# Patient Record
Sex: MALE | HISPANIC OR LATINO | Employment: UNEMPLOYED | URBAN - METROPOLITAN AREA
[De-identification: names, ages, dates, MRNs, and addresses within clinical notes are randomized per-mention and may not be internally consistent; named-entity substitution may affect disease eponyms.]

---
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
## 2022-03-12 ENCOUNTER — APPOINTMENT (OUTPATIENT)
Dept: RADIOLOGY | Facility: MEDICAL CENTER | Age: 22
End: 2022-03-12
Attending: EMERGENCY MEDICINE

## 2022-03-12 ENCOUNTER — HOSPITAL ENCOUNTER (EMERGENCY)
Facility: MEDICAL CENTER | Age: 22
End: 2022-03-12
Attending: EMERGENCY MEDICINE

## 2022-03-12 VITALS
WEIGHT: 130 LBS | BODY MASS INDEX: 21.66 KG/M2 | TEMPERATURE: 97.7 F | HEIGHT: 65 IN | SYSTOLIC BLOOD PRESSURE: 103 MMHG | RESPIRATION RATE: 15 BRPM | OXYGEN SATURATION: 95 % | DIASTOLIC BLOOD PRESSURE: 57 MMHG | HEART RATE: 79 BPM

## 2022-03-12 DIAGNOSIS — T07.XXXA ABRASIONS OF MULTIPLE SITES: ICD-10-CM

## 2022-03-12 DIAGNOSIS — S80.02XA HEMATOMA OF LEFT KNEE REGION: ICD-10-CM

## 2022-03-12 DIAGNOSIS — F10.920 ALCOHOLIC INTOXICATION WITHOUT COMPLICATION (HCC): ICD-10-CM

## 2022-03-12 LAB
ABO GROUP BLD: NORMAL
ALBUMIN SERPL BCP-MCNC: 5 G/DL (ref 3.2–4.9)
ALBUMIN/GLOB SERPL: 1.9 G/DL
ALP SERPL-CCNC: 90 U/L (ref 30–99)
ALT SERPL-CCNC: 20 U/L (ref 2–50)
ANION GAP SERPL CALC-SCNC: 18 MMOL/L (ref 7–16)
AST SERPL-CCNC: 26 U/L (ref 12–45)
BILIRUB SERPL-MCNC: 0.3 MG/DL (ref 0.1–1.5)
BLD GP AB SCN SERPL QL: NORMAL
BUN SERPL-MCNC: 9 MG/DL (ref 8–22)
CALCIUM SERPL-MCNC: 9.2 MG/DL (ref 8.5–10.5)
CHLORIDE SERPL-SCNC: 107 MMOL/L (ref 96–112)
CO2 SERPL-SCNC: 18 MMOL/L (ref 20–33)
CREAT SERPL-MCNC: 0.74 MG/DL (ref 0.5–1.4)
ERYTHROCYTE [DISTWIDTH] IN BLOOD BY AUTOMATED COUNT: 39 FL (ref 35.9–50)
ETHANOL BLD-MCNC: 253.1 MG/DL (ref 0–10)
GLOBULIN SER CALC-MCNC: 2.7 G/DL (ref 1.9–3.5)
GLUCOSE SERPL-MCNC: 97 MG/DL (ref 65–99)
HCT VFR BLD AUTO: 48 % (ref 42–52)
HGB BLD-MCNC: 16.8 G/DL (ref 14–18)
INR PPP: 0.93 (ref 0.87–1.13)
MCH RBC QN AUTO: 29.7 PG (ref 27–33)
MCHC RBC AUTO-ENTMCNC: 35 G/DL (ref 33.7–35.3)
MCV RBC AUTO: 84.8 FL (ref 81.4–97.8)
PLATELET # BLD AUTO: 319 K/UL (ref 164–446)
PMV BLD AUTO: 9.9 FL (ref 9–12.9)
POTASSIUM SERPL-SCNC: 3.8 MMOL/L (ref 3.6–5.5)
PROT SERPL-MCNC: 7.7 G/DL (ref 6–8.2)
PROTHROMBIN TIME: 12.2 SEC (ref 12–14.6)
RBC # BLD AUTO: 5.66 M/UL (ref 4.7–6.1)
RH BLD: NORMAL
SODIUM SERPL-SCNC: 143 MMOL/L (ref 135–145)
WBC # BLD AUTO: 12.4 K/UL (ref 4.8–10.8)

## 2022-03-12 PROCEDURE — 86850 RBC ANTIBODY SCREEN: CPT

## 2022-03-12 PROCEDURE — 72128 CT CHEST SPINE W/O DYE: CPT

## 2022-03-12 PROCEDURE — 700111 HCHG RX REV CODE 636 W/ 250 OVERRIDE (IP): Performed by: EMERGENCY MEDICINE

## 2022-03-12 PROCEDURE — 85610 PROTHROMBIN TIME: CPT

## 2022-03-12 PROCEDURE — 85027 COMPLETE CBC AUTOMATED: CPT

## 2022-03-12 PROCEDURE — 71260 CT THORAX DX C+: CPT

## 2022-03-12 PROCEDURE — 99285 EMERGENCY DEPT VISIT HI MDM: CPT

## 2022-03-12 PROCEDURE — 73560 X-RAY EXAM OF KNEE 1 OR 2: CPT | Mod: RT

## 2022-03-12 PROCEDURE — 307740 HCHG GREEN TRAUMA TEAM SERVICES

## 2022-03-12 PROCEDURE — 72131 CT LUMBAR SPINE W/O DYE: CPT

## 2022-03-12 PROCEDURE — 71045 X-RAY EXAM CHEST 1 VIEW: CPT

## 2022-03-12 PROCEDURE — 70450 CT HEAD/BRAIN W/O DYE: CPT

## 2022-03-12 PROCEDURE — 96374 THER/PROPH/DIAG INJ IV PUSH: CPT

## 2022-03-12 PROCEDURE — 86900 BLOOD TYPING SEROLOGIC ABO: CPT

## 2022-03-12 PROCEDURE — 73560 X-RAY EXAM OF KNEE 1 OR 2: CPT | Mod: LT

## 2022-03-12 PROCEDURE — 72170 X-RAY EXAM OF PELVIS: CPT

## 2022-03-12 PROCEDURE — 72125 CT NECK SPINE W/O DYE: CPT

## 2022-03-12 PROCEDURE — 86901 BLOOD TYPING SEROLOGIC RH(D): CPT

## 2022-03-12 PROCEDURE — 82077 ASSAY SPEC XCP UR&BREATH IA: CPT

## 2022-03-12 PROCEDURE — 700117 HCHG RX CONTRAST REV CODE 255: Performed by: EMERGENCY MEDICINE

## 2022-03-12 PROCEDURE — 80053 COMPREHEN METABOLIC PANEL: CPT

## 2022-03-12 RX ADMIN — FENTANYL CITRATE 100 MCG: 50 INJECTION, SOLUTION INTRAMUSCULAR; INTRAVENOUS at 20:54

## 2022-03-12 RX ADMIN — IOHEXOL 100 ML: 350 INJECTION, SOLUTION INTRAVENOUS at 21:15
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
## 2022-03-13 NOTE — ED NOTES
"Pt discharged home. IV discontinued and gauze placed, pt in possession of belongings. Pt provided discharge education and information pertaining to medications and follow up appointments. Pt received copy of discharge instructions and verbalized understanding. /57   Pulse 79   Temp 36.5 °C (97.7 °F) (Temporal)   Resp 15   Ht 1.651 m (5' 5\")   Wt 59 kg (130 lb)   SpO2 95%   BMI 21.63 kg/m²   "

## 2022-03-29 NOTE — ED AVS SNAPSHOT
Spiration Access Code: [email or web address]  Expires: 7/6/2017  1:54 PM    Your email address is not on file at [facility].  Email Addresses are required for you to sign up for Spiration, please contact [phone] to verify your personal information and to provide your email address prior to attempting to register for Spiration.    Praful Carmen  [address] [place] [address]    Spiration  A secure, online tool to manage your health information     [facility]’s Spiration® is a secure, online tool that connects you to your personalized health information from the privacy of your home -- day or night - making it very easy for you to manage your healthcare. Once the activation process is completed, you can even access your medical information using the Spiration suzette, which is available for free in the Apple Suzette store or Google Play store.     To learn more about Spiration, visit [email or web address]    There are two levels of access available (as shown below):   My Chart Features  [facility] Primary Care Doctor [facility]  Specialists [facility]  Urgent  Care [facility] Primary Care Doctor   Email your healthcare team securely and privately 24/7 X X X    Manage appointments: schedule your next appointment; view details of past/upcoming appointments X      Request prescription refills. X      View recent personal medical records, including lab and immunizations X X X X   View health record, including health history, allergies, medications X X X X   Read reports about your outpatient visits, procedures, consult and ER notes X X X X   See your discharge summary, which is a recap of your hospital and/or ER visit that includes your diagnosis, lab results, and care plan X X  X     How to register for [employer]:  Once your e-mail address has been verified, follow the following steps to sign up for Spiration.     1. Go to  [email or web address]  2. Click on the Sign Up Now box, which takes you to the New Member Sign Up page. You  will need to provide the following information:  a. Enter your TrustTeam Access Code exactly as it appears at the top of this page. (You will not need to use this code after you’ve completed the sign-up process. If you do not sign up before the expiration date, you must request a new code.)   b. Enter your date of birth.   c. Enter your home email address.   d. Click Submit, and follow the next screen’s instructions.  3. Create a Traianat ID. This will be your TrustTeam login ID and cannot be changed, so think of one that is secure and easy to remember.  4. Create a TrustTeam password. You can change your password at any time.  5. Enter your Password Reset Question and Answer. This can be used at a later time if you forget your password.   6. Enter your e-mail address. This allows you to receive e-mail notifications when new information is available in TrustTeam.  7. Click Sign Up. You can now view your health information.    For assistance activating your TrustTeam account, call [phone]          [facility] Counseling:  I discussed with the patient the risks of ustekinumab including but not limited to immunosuppression, malignancy, posterior leukoencephalopathy syndrome, and serious infections.  The patient understands that monitoring is required including a PPD at baseline and must alert us or the primary physician if symptoms of infection or other concerning signs are noted.

## 2022-04-29 ENCOUNTER — NON-PROVIDER VISIT (OUTPATIENT)
Dept: URGENT CARE | Facility: PHYSICIAN GROUP | Age: 22
End: 2022-04-29

## 2022-04-29 DIAGNOSIS — Z02.1 PRE-EMPLOYMENT DRUG SCREENING: ICD-10-CM

## 2022-04-29 LAB
AMP AMPHETAMINE: NORMAL
COC COCAINE: NORMAL
INT CON NEG: NORMAL
INT CON POS: NORMAL
MET METHAMPHETAMINES: NORMAL
OPI OPIATES: NORMAL
PCP PHENCYCLIDINE: NORMAL
POC DRUG COMMENT 753798-OCCUPATIONAL HEALTH: NORMAL
THC: NORMAL

## 2022-04-29 PROCEDURE — 80305 DRUG TEST PRSMV DIR OPT OBS: CPT | Performed by: NURSE PRACTITIONER

## 2024-03-25 ENCOUNTER — OFFICE VISIT (OUTPATIENT)
Dept: URGENT CARE | Facility: CLINIC | Age: 24
End: 2024-03-25

## 2024-03-25 VITALS
TEMPERATURE: 98.1 F | OXYGEN SATURATION: 98 % | RESPIRATION RATE: 20 BRPM | HEART RATE: 87 BPM | WEIGHT: 144 LBS | DIASTOLIC BLOOD PRESSURE: 88 MMHG | HEIGHT: 60 IN | SYSTOLIC BLOOD PRESSURE: 128 MMHG | BODY MASS INDEX: 28.27 KG/M2
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
## 2024-03-25 PROCEDURE — 3079F DIAST BP 80-89 MM HG: CPT | Performed by: NURSE PRACTITIONER

## 2024-03-25 PROCEDURE — 99203 OFFICE O/P NEW LOW 30 MIN: CPT | Performed by: NURSE PRACTITIONER

## 2024-03-25 PROCEDURE — 3074F SYST BP LT 130 MM HG: CPT | Performed by: NURSE PRACTITIONER

## 2024-03-25 RX ORDER — AMOXICILLIN AND CLAVULANATE POTASSIUM 875; 125 MG/1; MG/1
1 TABLET, FILM COATED ORAL 2 TIMES DAILY
Qty: 20 TABLET | Refills: 0 | Status: SHIPPED | OUTPATIENT
Start: 2024-03-25 | End: 2024-04-04

## 2024-03-25 NOTE — LETTER
[facility]  [facility]  [address] [facility] [address]  [place] [address]     March 25, 2024    Patient: Praful Carmen   YOB: 2000   Date of Visit: 3/25/2024       To Whom It May Concern:    Praful Carmen was seen and treated in our department on 3/25/2024. Please excuse from work 3/25/24 through 3/27/24. May return 3/28/24.     Sincerely,     MIL Selby.

## 2024-03-25 NOTE — PROGRESS NOTES
Date: 03/25/24        Chief Complaint   Patient presents with    Animal Bite     Dog bite yesterday         History of Present Illness: 23 y.o.  male presents to clinic with multiple abrasions and lacerations to his right hand after he was bit by a dog [place] the dog park.  He states it was pitbull the dog was not acting erratic.  He denies any distal numbness or tingling.  He denies any fever or bodyaches.  He believes the dog was up-to-date on vaccinations.  He did speak with the owner.      ROS:    No severe shortness of breath   No Cardiac like chest pain, as discussed   As otherwise stated in HPI    Medical/SX/ Social History:  Reviewed per chart    Pertinent Medications:    Current Outpatient Medications on File Prior to Visit   Medication Sig Dispense Refill    acetaminophen (TYLENOL) 500 MG Tab Take 2 Tabs by mouth every 6 hours. (Patient not taking: Reported on 3/25/2024) 30 Tab 0    oxycodone immediate-release (ROXICODONE) 5 MG Tab Take 1-2 Tabs by mouth every 8 hours as needed for Severe Pain. (Patient not taking: Reported on 3/25/2024) 14 Tab 0    ferrous sulfate 325 (65 FE) MG EC tablet Take 1 Tab by mouth every day. (Patient not taking: Reported on 3/25/2024)      ibuprofen (MOTRIN) 800 MG Tab Take 1 Tab by mouth every 8 hours as needed for Moderate Pain. (Patient not taking: Reported on 3/25/2024)       No current facility-administered medications on file prior to visit.        Allergies:    Patient has no known allergies.     Problem list, medications, and allergies reviewed by myself today in Epic     Physical Exam:    Vitals:    03/25/24 0858   BP: 128/88   Pulse: 87   Resp: 20   Temp: 36.7 °C (98.1 °F)   SpO2: 98%             Physical Exam  Constitutional:       General: He is not in acute distress.     Appearance: Normal appearance. He is not ill-appearing.   Musculoskeletal:      Right elbow: Normal.      Right forearm: Swelling, laceration and tenderness present. No edema, deformity or  bony tenderness.      Right hand: Swelling, laceration and tenderness present. No bony tenderness. Decreased range of motion (Fifth DIP secondary to swelling). Normal strength. Normal sensation. There is no disruption of two-point discrimination. Normal capillary refill. Normal pulse.      Left hand: No bony tenderness. Normal strength. Normal sensation. There is no disruption of two-point discrimination. Normal capillary refill. Normal pulse.        Arms:         Hands:    Neurological:      Mental Status: He is alert and oriented to person, place, and time.      Cranial Nerves: Cranial nerves 2-12 are intact.      Sensory: Sensation is intact.      Motor: Motor function is intact.      Coordination: Coordination is intact.   Psychiatric:         Attention and Perception: Attention normal.         Mood and Affect: Mood is anxious.         Speech: Speech normal.         Behavior: Behavior normal.              Medical Decision making and plan :  I personally reviewed prior external notes and test results pertinent to today's visit. Pt is clinically stable at today's acute urgent care visit.  Patient appears nontoxic with no acute distress noted. Appropriate for outpatient care at this time.      Pleasant 23 y.o. male presented clinic with multiple abrasions and avulsion secondary to dog bite that happened yesterday.  Patient does not need a tetanus vaccination.  Although due to cash pay patient would like to obtain his tetanus at the pharmacy as it would be more cost effective.  Did stress importance of receiving his tetanus.  Will start patient on Augmentin while there are no active signs of infection I will send for 10 days advised on antibiotics in entirety.  Discussed appropriate wound care.  For avulsion of skin of the fifth right finger, covered in Polysporin Xeroform nonstick Telfa and Coban.  We did discuss obtaining x-ray.  Although low suspicion of acute fracture as there is no puncture injury.  Patient  like to defer x-ray at this time advised that if pain is not improving to the DIP in 10 days to return to clinic.  Discussed signs and symptoms of infection.  Patient was provided wound care materials.  Work note provided    1. Dog bite, initial encounter    - amoxicillin-clavulanate (AUGMENTIN) 875-125 MG Tab; Take 1 Tablet by mouth 2 times a day for 10 days.  Dispense: 20 Tablet; Refill: 0       Shared decision-making was utilized with patient for treatment plan. Medication discussed included indication for use and the potential benefits and side effects. Education was provided regarding the aforementioned assessments.  Differential Diagnosis, natural history, and supportive care discussed. All of the patient's questions were answered to their satisfaction at the time of discharge. Patient was encouraged to monitor symptoms closely. Those signs and symptoms which would warrant concern and mandate seeking a higher level of service through the emergency department discussed at length.  Patient stated agreement and understanding of this plan of care.    Disposition:  Home in stable condition       Voice Recognition Disclaimer:  Portions of this document were created using voice recognition software. The software does have a chance of producing errors of grammar and possibly content. I have made every reasonable attempt to correct obvious errors, but there may be errors of grammar and possibly content that I did not discover before finalizing the documentation.    [email or web address].

## 2024-06-16 ENCOUNTER — APPOINTMENT (OUTPATIENT)
Dept: RADIOLOGY | Facility: MEDICAL CENTER | Age: 24
End: 2024-06-16
Attending: STUDENT IN AN ORGANIZED HEALTH CARE EDUCATION/TRAINING PROGRAM

## 2024-06-16 ENCOUNTER — HOSPITAL ENCOUNTER (EMERGENCY)
Facility: MEDICAL CENTER | Age: 24
End: 2024-06-16
Attending: STUDENT IN AN ORGANIZED HEALTH CARE EDUCATION/TRAINING PROGRAM

## 2024-06-16 VITALS
BODY MASS INDEX: 23.7 KG/M2 | DIASTOLIC BLOOD PRESSURE: 65 MMHG | HEART RATE: 89 BPM | WEIGHT: 160 LBS | RESPIRATION RATE: 17 BRPM | OXYGEN SATURATION: 94 % | SYSTOLIC BLOOD PRESSURE: 119 MMHG | HEIGHT: 69 IN | TEMPERATURE: 97.3 F

## 2024-06-16 DIAGNOSIS — T07.XXXA MULTIPLE ABRASIONS: ICD-10-CM
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
## 2024-06-16 DIAGNOSIS — S80.01XA CONTUSION OF RIGHT KNEE, INITIAL ENCOUNTER: ICD-10-CM

## 2024-06-16 DIAGNOSIS — S46.211A STRAIN OF RIGHT BICEPS MUSCLE, INITIAL ENCOUNTER: ICD-10-CM

## 2024-06-16 LAB
ABO GROUP BLD: NORMAL
ALBUMIN SERPL BCP-MCNC: 4.7 G/DL (ref 3.2–4.9)
ALBUMIN/GLOB SERPL: 1.6 G/DL
ALP SERPL-CCNC: 94 U/L (ref 30–99)
ALT SERPL-CCNC: 45 U/L (ref 2–50)
ANION GAP SERPL CALC-SCNC: 20 MMOL/L (ref 7–16)
APTT PPP: 22.3 SEC (ref 24.7–36)
AST SERPL-CCNC: 69 U/L (ref 12–45)
BILIRUB SERPL-MCNC: 0.5 MG/DL (ref 0.1–1.5)
BLD GP AB SCN SERPL QL: NORMAL
BUN SERPL-MCNC: 8 MG/DL (ref 8–22)
CALCIUM ALBUM COR SERPL-MCNC: 8.1 MG/DL (ref 8.5–10.5)
CALCIUM SERPL-MCNC: 8.7 MG/DL (ref 8.5–10.5)
CHLORIDE SERPL-SCNC: 108 MMOL/L (ref 96–112)
CO2 SERPL-SCNC: 17 MMOL/L (ref 20–33)
CREAT SERPL-MCNC: 0.76 MG/DL (ref 0.5–1.4)
ERYTHROCYTE [DISTWIDTH] IN BLOOD BY AUTOMATED COUNT: 38.6 FL (ref 35.9–50)
ETHANOL BLD-MCNC: 294.2 MG/DL
GFR SERPLBLD CREATININE-BSD FMLA CKD-EPI: 128 ML/MIN/1.73 M 2
GLOBULIN SER CALC-MCNC: 2.9 G/DL (ref 1.9–3.5)
GLUCOSE SERPL-MCNC: 137 MG/DL (ref 65–99)
HCT VFR BLD AUTO: 47.4 % (ref 42–52)
HGB BLD-MCNC: 16.8 G/DL (ref 14–18)
INR PPP: 1.06 (ref 0.87–1.13)
MCH RBC QN AUTO: 30.3 PG (ref 27–33)
MCHC RBC AUTO-ENTMCNC: 35.4 G/DL (ref 32.3–36.5)
MCV RBC AUTO: 85.4 FL (ref 81.4–97.8)
PLATELET # BLD AUTO: 357 K/UL (ref 164–446)
PMV BLD AUTO: 9.5 FL (ref 9–12.9)
POTASSIUM SERPL-SCNC: 3.2 MMOL/L (ref 3.6–5.5)
PROT SERPL-MCNC: 7.6 G/DL (ref 6–8.2)
PROTHROMBIN TIME: 13.9 SEC (ref 12–14.6)
RBC # BLD AUTO: 5.55 M/UL (ref 4.7–6.1)
RH BLD: NORMAL
SODIUM SERPL-SCNC: 145 MMOL/L (ref 135–145)
WBC # BLD AUTO: 16.9 K/UL (ref 4.8–10.8)

## 2024-06-16 PROCEDURE — 86900 BLOOD TYPING SEROLOGIC ABO: CPT

## 2024-06-16 PROCEDURE — 85730 THROMBOPLASTIN TIME PARTIAL: CPT

## 2024-06-16 PROCEDURE — 90715 TDAP VACCINE 7 YRS/> IM: CPT | Performed by: STUDENT IN AN ORGANIZED HEALTH CARE EDUCATION/TRAINING PROGRAM

## 2024-06-16 PROCEDURE — 73560 X-RAY EXAM OF KNEE 1 OR 2: CPT | Mod: RT

## 2024-06-16 PROCEDURE — 36415 COLL VENOUS BLD VENIPUNCTURE: CPT

## 2024-06-16 PROCEDURE — 73080 X-RAY EXAM OF ELBOW: CPT | Mod: RT

## 2024-06-16 PROCEDURE — 86850 RBC ANTIBODY SCREEN: CPT

## 2024-06-16 PROCEDURE — 96374 THER/PROPH/DIAG INJ IV PUSH: CPT

## 2024-06-16 PROCEDURE — 71045 X-RAY EXAM CHEST 1 VIEW: CPT

## 2024-06-16 PROCEDURE — 85027 COMPLETE CBC AUTOMATED: CPT

## 2024-06-16 PROCEDURE — 70450 CT HEAD/BRAIN W/O DYE: CPT

## 2024-06-16 PROCEDURE — 90471 IMMUNIZATION ADMIN: CPT

## 2024-06-16 PROCEDURE — 85610 PROTHROMBIN TIME: CPT

## 2024-06-16 PROCEDURE — 73120 X-RAY EXAM OF HAND: CPT | Mod: LT

## 2024-06-16 PROCEDURE — 80053 COMPREHEN METABOLIC PANEL: CPT

## 2024-06-16 PROCEDURE — 73130 X-RAY EXAM OF HAND: CPT | Mod: RT

## 2024-06-16 PROCEDURE — 73060 X-RAY EXAM OF HUMERUS: CPT | Mod: RT

## 2024-06-16 PROCEDURE — A9270 NON-COVERED ITEM OR SERVICE: HCPCS | Performed by: STUDENT IN AN ORGANIZED HEALTH CARE EDUCATION/TRAINING PROGRAM

## 2024-06-16 PROCEDURE — 72125 CT NECK SPINE W/O DYE: CPT

## 2024-06-16 PROCEDURE — 305948 HCHG GREEN TRAUMA ACT PRE-NOTIFY NO CC

## 2024-06-16 PROCEDURE — 700111 HCHG RX REV CODE 636 W/ 250 OVERRIDE (IP): Mod: JZ | Performed by: STUDENT IN AN ORGANIZED HEALTH CARE EDUCATION/TRAINING PROGRAM

## 2024-06-16 PROCEDURE — 82077 ASSAY SPEC XCP UR&BREATH IA: CPT

## 2024-06-16 PROCEDURE — 72170 X-RAY EXAM OF PELVIS: CPT

## 2024-06-16 PROCEDURE — 99285 EMERGENCY DEPT VISIT HI MDM: CPT

## 2024-06-16 PROCEDURE — 700102 HCHG RX REV CODE 250 W/ 637 OVERRIDE(OP): Performed by: STUDENT IN AN ORGANIZED HEALTH CARE EDUCATION/TRAINING PROGRAM

## 2024-06-16 PROCEDURE — 700105 HCHG RX REV CODE 258: Performed by: STUDENT IN AN ORGANIZED HEALTH CARE EDUCATION/TRAINING PROGRAM

## 2024-06-16 PROCEDURE — 86901 BLOOD TYPING SEROLOGIC RH(D): CPT

## 2024-06-16 RX ORDER — SODIUM CHLORIDE, SODIUM LACTATE, POTASSIUM CHLORIDE, CALCIUM CHLORIDE 600; 310; 30; 20 MG/100ML; MG/100ML; MG/100ML; MG/100ML
1000 INJECTION, SOLUTION INTRAVENOUS ONCE
Status: COMPLETED | OUTPATIENT
Start: 2024-06-16 | End: 2024-06-16

## 2024-06-16 RX ORDER — ACETAMINOPHEN 500 MG
1000 TABLET ORAL ONCE
Status: COMPLETED | OUTPATIENT
Start: 2024-06-16 | End: 2024-06-16

## 2024-06-16 RX ADMIN — CLOSTRIDIUM TETANI TOXOID ANTIGEN (FORMALDEHYDE INACTIVATED), CORYNEBACTERIUM DIPHTHERIAE TOXOID ANTIGEN (FORMALDEHYDE INACTIVATED), BORDETELLA PERTUSSIS TOXOID ANTIGEN (GLUTARALDEHYDE INACTIVATED), BORDETELLA PERTUSSIS FILAMENTOUS HEMAGGLUTININ ANTIGEN (FORMALDEHYDE INACTIVATED), BORDETELLA PERTUSSIS PERTACTIN ANTIGEN, AND BORDETELLA PERTUSSIS FIMBRIAE 2/3 ANTIGEN 0.5 ML: 5; 2; 2.5; 5; 3; 5 INJECTION, SUSPENSION INTRAMUSCULAR at 01:48

## 2024-06-16 RX ADMIN — SODIUM CHLORIDE, POTASSIUM CHLORIDE, SODIUM LACTATE AND CALCIUM CHLORIDE 1000 ML: 600; 310; 30; 20 INJECTION, SOLUTION INTRAVENOUS at 01:46

## 2024-06-16 RX ADMIN — ACETAMINOPHEN 1000 MG: 500 TABLET, FILM COATED ORAL at 01:45

## 2024-06-16 RX ADMIN — FENTANYL CITRATE 50 MCG: 50 INJECTION, SOLUTION INTRAMUSCULAR; INTRAVENOUS at 01:30

## 2024-06-16 ASSESSMENT — PAIN DESCRIPTION - PAIN TYPE
TYPE: ACUTE PAIN
TYPE: ACUTE PAIN

## 2024-06-16 NOTE — ED NOTES
Pt discharged to home. Discharge paperwork provided. Education provided by ERP. Reinforced discharge instructions.  Pt was given follow up instructions  Pt verbalized understanding of all instructions for discharge.   Patient went out of the ER via wheelchair., alert and oriented x 4, with all belongings.

## 2024-06-16 NOTE — DISCHARGE INSTRUCTIONS
He will likely be more sore tomorrow than you are now.  Take Tylenol and ibuprofen every 6 hours for pain.  Get plenty of rest.
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
## 2024-08-19 ENCOUNTER — HOSPITAL ENCOUNTER (INPATIENT)
Facility: MEDICAL CENTER | Age: 24
LOS: 4 days | DRG: 896 | End: 2024-08-23
Attending: EMERGENCY MEDICINE | Admitting: INTERNAL MEDICINE
Payer: MEDICAID

## 2024-08-19 ENCOUNTER — APPOINTMENT (OUTPATIENT)
Dept: RADIOLOGY | Facility: MEDICAL CENTER | Age: 24
DRG: 896 | End: 2024-08-19
Attending: EMERGENCY MEDICINE
Payer: MEDICAID

## 2024-08-19 DIAGNOSIS — F10.930 ALCOHOL WITHDRAWAL SYNDROME, UNCOMPLICATED (HCC): ICD-10-CM

## 2024-08-19 DIAGNOSIS — E88.89 ALCOHOLIC KETOSIS (HCC): ICD-10-CM

## 2024-08-19 DIAGNOSIS — E86.0 DEHYDRATION: ICD-10-CM

## 2024-08-19 DIAGNOSIS — R11.2 NAUSEA AND VOMITING, UNSPECIFIED VOMITING TYPE: ICD-10-CM

## 2024-08-19 DIAGNOSIS — F10.931 ALCOHOL WITHDRAWAL DELIRIUM, ACUTE, HYPERACTIVE (HCC): ICD-10-CM

## 2024-08-19 DIAGNOSIS — F39 MOOD DISORDER (HCC): ICD-10-CM

## 2024-08-19 DIAGNOSIS — F19.10 DRUG ABUSE (HCC): ICD-10-CM

## 2024-08-19 DIAGNOSIS — K22.6 MALLORY-WEISS SYNDROME: ICD-10-CM

## 2024-08-19 DIAGNOSIS — E87.20 LACTIC ACIDOSIS: ICD-10-CM

## 2024-08-19 PROBLEM — R65.10 SIRS (SYSTEMIC INFLAMMATORY RESPONSE SYNDROME) (HCC): Status: ACTIVE | Noted: 2024-08-19

## 2024-08-19 PROBLEM — E87.6 HYPOKALEMIA: Status: ACTIVE | Noted: 2024-08-19

## 2024-08-19 PROBLEM — F10.939 ALCOHOL WITHDRAWAL (HCC): Status: ACTIVE | Noted: 2024-08-19

## 2024-08-19 LAB
ALBUMIN SERPL BCP-MCNC: 5.2 G/DL (ref 3.2–4.9)
ALBUMIN/GLOB SERPL: 2.9 G/DL
ALP SERPL-CCNC: 100 U/L (ref 30–99)
ALT SERPL-CCNC: 44 U/L (ref 2–50)
AMPHET UR QL SCN: NEGATIVE
ANION GAP SERPL CALC-SCNC: 30 MMOL/L (ref 7–16)
APPEARANCE UR: CLEAR
AST SERPL-CCNC: 43 U/L (ref 12–45)
BACTERIA #/AREA URNS HPF: NEGATIVE /HPF
BARBITURATES UR QL SCN: POSITIVE
BASE EXCESS BLDV CALC-SCNC: 0 MMOL/L
BASOPHILS # BLD AUTO: 0.2 % (ref 0–1.8)
BASOPHILS # BLD: 0.04 K/UL (ref 0–0.12)
BENZODIAZ UR QL SCN: POSITIVE
BILIRUB SERPL-MCNC: 1.3 MG/DL (ref 0.1–1.5)
BILIRUB UR QL STRIP.AUTO: NEGATIVE
BODY TEMPERATURE: 36.5 CENTIGRADE
BUN SERPL-MCNC: 11 MG/DL (ref 8–22)
BZE UR QL SCN: POSITIVE
CALCIUM ALBUM COR SERPL-MCNC: 8.9 MG/DL (ref 8.5–10.5)
CALCIUM SERPL-MCNC: 9.9 MG/DL (ref 8.5–10.5)
CANNABINOIDS UR QL SCN: POSITIVE
CHLORIDE SERPL-SCNC: 94 MMOL/L (ref 96–112)
CK SERPL-CCNC: 310 U/L (ref 0–154)
CO2 SERPL-SCNC: 16 MMOL/L (ref 20–33)
COLOR UR: YELLOW
CREAT SERPL-MCNC: 1.07 MG/DL (ref 0.5–1.4)
EOSINOPHIL # BLD AUTO: 0 K/UL (ref 0–0.51)
EOSINOPHIL NFR BLD: 0 % (ref 0–6.9)
EPI CELLS #/AREA URNS HPF: NEGATIVE /HPF
ERYTHROCYTE [DISTWIDTH] IN BLOOD BY AUTOMATED COUNT: 37.4 FL (ref 35.9–50)
ETHANOL BLD-MCNC: 14.9 MG/DL
FENTANYL UR QL: NEGATIVE
GFR SERPLBLD CREATININE-BSD FMLA CKD-EPI: 99 ML/MIN/1.73 M 2
GLOBULIN SER CALC-MCNC: 1.8 G/DL (ref 1.9–3.5)
GLUCOSE SERPL-MCNC: 153 MG/DL (ref 65–99)
GLUCOSE UR STRIP.AUTO-MCNC: NEGATIVE MG/DL
HCO3 BLDV-SCNC: 23 MMOL/L (ref 24–28)
HCT VFR BLD AUTO: 47.6 % (ref 42–52)
HGB BLD-MCNC: 17.3 G/DL (ref 14–18)
HYALINE CASTS #/AREA URNS LPF: ABNORMAL /LPF
IMM GRANULOCYTES # BLD AUTO: 0.08 K/UL (ref 0–0.11)
IMM GRANULOCYTES NFR BLD AUTO: 0.4 % (ref 0–0.9)
INHALED O2 FLOW RATE: ABNORMAL L/MIN
KETONES UR STRIP.AUTO-MCNC: NEGATIVE MG/DL
LACTATE SERPL-SCNC: 5.3 MMOL/L (ref 0.5–2)
LACTATE SERPL-SCNC: 8.4 MMOL/L (ref 0.5–2)
LEUKOCYTE ESTERASE UR QL STRIP.AUTO: NEGATIVE
LIPASE SERPL-CCNC: 30 U/L (ref 11–82)
LYMPHOCYTES # BLD AUTO: 1.27 K/UL (ref 1–4.8)
LYMPHOCYTES NFR BLD: 6.8 % (ref 22–41)
MAGNESIUM SERPL-MCNC: 1.8 MG/DL (ref 1.5–2.5)
MCH RBC QN AUTO: 29.9 PG (ref 27–33)
MCHC RBC AUTO-ENTMCNC: 36.3 G/DL (ref 32.3–36.5)
MCV RBC AUTO: 82.4 FL (ref 81.4–97.8)
METHADONE UR QL SCN: NEGATIVE
MICRO URNS: ABNORMAL
MONOCYTES # BLD AUTO: 1.14 K/UL (ref 0–0.85)
MONOCYTES NFR BLD AUTO: 6.1 % (ref 0–13.4)
NEUTROPHILS # BLD AUTO: 16.03 K/UL (ref 1.82–7.42)
NEUTROPHILS NFR BLD: 86.5 % (ref 44–72)
NITRITE UR QL STRIP.AUTO: NEGATIVE
NRBC # BLD AUTO: 0 K/UL
NRBC BLD-RTO: 0 /100 WBC (ref 0–0.2)
OPIATES UR QL SCN: POSITIVE
OXYCODONE UR QL SCN: POSITIVE
PCO2 BLDV: 31.5 MMHG (ref 41–51)
PCO2 TEMP ADJ BLDV: 30.8 MMHG (ref 41–51)
PCP UR QL SCN: NEGATIVE
PH BLDV: 7.47 [PH] (ref 7.31–7.45)
PH TEMP ADJ BLDV: 7.48 [PH] (ref 7.31–7.45)
PH UR STRIP.AUTO: 8.5 [PH] (ref 5–8)
PLATELET # BLD AUTO: 351 K/UL (ref 164–446)
PMV BLD AUTO: 9.8 FL (ref 9–12.9)
PO2 BLDV: 69 MMHG (ref 25–40)
PO2 TEMP ADJ BLDV: 66.7 MMHG (ref 25–40)
POTASSIUM SERPL-SCNC: 3.4 MMOL/L (ref 3.6–5.5)
PROPOXYPH UR QL SCN: NEGATIVE
PROT SERPL-MCNC: 7 G/DL (ref 6–8.2)
PROT UR QL STRIP: 30 MG/DL
RBC # BLD AUTO: 5.78 M/UL (ref 4.7–6.1)
RBC # URNS HPF: ABNORMAL /HPF
RBC UR QL AUTO: NEGATIVE
SAO2 % BLDV: 92 %
SODIUM SERPL-SCNC: 140 MMOL/L (ref 135–145)
SP GR UR STRIP.AUTO: >=1.045
UROBILINOGEN UR STRIP.AUTO-MCNC: 1 MG/DL
WBC # BLD AUTO: 18.6 K/UL (ref 4.8–10.8)
WBC #/AREA URNS HPF: ABNORMAL /HPF

## 2024-08-19 PROCEDURE — 74177 CT ABD & PELVIS W/CONTRAST: CPT

## 2024-08-19 PROCEDURE — 80053 COMPREHEN METABOLIC PANEL: CPT

## 2024-08-19 PROCEDURE — 96365 THER/PROPH/DIAG IV INF INIT: CPT

## 2024-08-19 PROCEDURE — 94760 N-INVAS EAR/PLS OXIMETRY 1: CPT

## 2024-08-19 PROCEDURE — 83690 ASSAY OF LIPASE: CPT

## 2024-08-19 PROCEDURE — 80307 DRUG TEST PRSMV CHEM ANLYZR: CPT

## 2024-08-19 PROCEDURE — 700102 HCHG RX REV CODE 250 W/ 637 OVERRIDE(OP): Performed by: INTERNAL MEDICINE

## 2024-08-19 PROCEDURE — 83605 ASSAY OF LACTIC ACID: CPT | Mod: 91

## 2024-08-19 PROCEDURE — 96375 TX/PRO/DX INJ NEW DRUG ADDON: CPT

## 2024-08-19 PROCEDURE — 770020 HCHG ROOM/CARE - TELE (206)

## 2024-08-19 PROCEDURE — 85025 COMPLETE CBC W/AUTO DIFF WBC: CPT

## 2024-08-19 PROCEDURE — 82077 ASSAY SPEC XCP UR&BREATH IA: CPT

## 2024-08-19 PROCEDURE — 83735 ASSAY OF MAGNESIUM: CPT

## 2024-08-19 PROCEDURE — 71045 X-RAY EXAM CHEST 1 VIEW: CPT

## 2024-08-19 PROCEDURE — A9270 NON-COVERED ITEM OR SERVICE: HCPCS | Performed by: INTERNAL MEDICINE

## 2024-08-19 PROCEDURE — 700111 HCHG RX REV CODE 636 W/ 250 OVERRIDE (IP): Mod: JZ | Performed by: EMERGENCY MEDICINE

## 2024-08-19 PROCEDURE — 700111 HCHG RX REV CODE 636 W/ 250 OVERRIDE (IP): Mod: JZ | Performed by: INTERNAL MEDICINE

## 2024-08-19 PROCEDURE — 96366 THER/PROPH/DIAG IV INF ADDON: CPT

## 2024-08-19 PROCEDURE — 93005 ELECTROCARDIOGRAM TRACING: CPT

## 2024-08-19 PROCEDURE — 99223 1ST HOSP IP/OBS HIGH 75: CPT | Performed by: INTERNAL MEDICINE

## 2024-08-19 PROCEDURE — 700101 HCHG RX REV CODE 250: Performed by: INTERNAL MEDICINE

## 2024-08-19 PROCEDURE — 82550 ASSAY OF CK (CPK): CPT

## 2024-08-19 PROCEDURE — 99285 EMERGENCY DEPT VISIT HI MDM: CPT

## 2024-08-19 PROCEDURE — 700105 HCHG RX REV CODE 258: Performed by: EMERGENCY MEDICINE

## 2024-08-19 PROCEDURE — 700117 HCHG RX CONTRAST REV CODE 255: Performed by: EMERGENCY MEDICINE

## 2024-08-19 PROCEDURE — 700111 HCHG RX REV CODE 636 W/ 250 OVERRIDE (IP): Mod: JZ

## 2024-08-19 PROCEDURE — 96376 TX/PRO/DX INJ SAME DRUG ADON: CPT

## 2024-08-19 PROCEDURE — 82803 BLOOD GASES ANY COMBINATION: CPT

## 2024-08-19 PROCEDURE — 81001 URINALYSIS AUTO W/SCOPE: CPT

## 2024-08-19 PROCEDURE — 36415 COLL VENOUS BLD VENIPUNCTURE: CPT

## 2024-08-19 RX ORDER — LORAZEPAM 2 MG/1
4 TABLET ORAL
Status: DISCONTINUED | OUTPATIENT
Start: 2024-08-19 | End: 2024-08-23 | Stop reason: HOSPADM

## 2024-08-19 RX ORDER — ONDANSETRON 4 MG/1
4 TABLET, ORALLY DISINTEGRATING ORAL EVERY 4 HOURS PRN
Status: DISCONTINUED | OUTPATIENT
Start: 2024-08-19 | End: 2024-08-21

## 2024-08-19 RX ORDER — SODIUM CHLORIDE AND POTASSIUM CHLORIDE 300; 900 MG/100ML; MG/100ML
INJECTION, SOLUTION INTRAVENOUS CONTINUOUS
Status: DISCONTINUED | OUTPATIENT
Start: 2024-08-19 | End: 2024-08-23 | Stop reason: HOSPADM

## 2024-08-19 RX ORDER — LABETALOL HYDROCHLORIDE 5 MG/ML
10 INJECTION, SOLUTION INTRAVENOUS
Status: DISCONTINUED | OUTPATIENT
Start: 2024-08-19 | End: 2024-08-23 | Stop reason: HOSPADM

## 2024-08-19 RX ORDER — GAUZE BANDAGE 2" X 2"
100 BANDAGE TOPICAL DAILY
Status: COMPLETED | OUTPATIENT
Start: 2024-08-19 | End: 2024-08-22

## 2024-08-19 RX ORDER — LORAZEPAM 2 MG/ML
2 INJECTION INTRAMUSCULAR ONCE
Status: COMPLETED | OUTPATIENT
Start: 2024-08-19 | End: 2024-08-19

## 2024-08-19 RX ORDER — LORAZEPAM 1 MG/1
0.5 TABLET ORAL EVERY 4 HOURS PRN
Status: DISCONTINUED | OUTPATIENT
Start: 2024-08-19 | End: 2024-08-23 | Stop reason: HOSPADM

## 2024-08-19 RX ORDER — LORAZEPAM 2 MG/1
2 TABLET ORAL
Status: DISCONTINUED | OUTPATIENT
Start: 2024-08-19 | End: 2024-08-23 | Stop reason: HOSPADM

## 2024-08-19 RX ORDER — ONDANSETRON 2 MG/ML
4 INJECTION INTRAMUSCULAR; INTRAVENOUS ONCE
Status: COMPLETED | OUTPATIENT
Start: 2024-08-19 | End: 2024-08-19

## 2024-08-19 RX ORDER — ENOXAPARIN SODIUM 100 MG/ML
40 INJECTION SUBCUTANEOUS DAILY
Status: DISCONTINUED | OUTPATIENT
Start: 2024-08-19 | End: 2024-08-19

## 2024-08-19 RX ORDER — SODIUM CHLORIDE, SODIUM LACTATE, POTASSIUM CHLORIDE, CALCIUM CHLORIDE 600; 310; 30; 20 MG/100ML; MG/100ML; MG/100ML; MG/100ML
1000 INJECTION, SOLUTION INTRAVENOUS ONCE
Status: COMPLETED | OUTPATIENT
Start: 2024-08-19 | End: 2024-08-19

## 2024-08-19 RX ORDER — PANTOPRAZOLE SODIUM 40 MG/10ML
40 INJECTION, POWDER, LYOPHILIZED, FOR SOLUTION INTRAVENOUS 2 TIMES DAILY
Status: DISCONTINUED | OUTPATIENT
Start: 2024-08-19 | End: 2024-08-22

## 2024-08-19 RX ORDER — LORAZEPAM 2 MG/ML
0.5 INJECTION INTRAMUSCULAR EVERY 4 HOURS PRN
Status: DISCONTINUED | OUTPATIENT
Start: 2024-08-19 | End: 2024-08-23 | Stop reason: HOSPADM

## 2024-08-19 RX ORDER — NALTREXONE HYDROCHLORIDE 50 MG/1
50 TABLET, FILM COATED ORAL DAILY
Status: ON HOLD | COMMUNITY
Start: 2024-07-22 | End: 2024-08-23

## 2024-08-19 RX ORDER — PROMETHAZINE HYDROCHLORIDE 25 MG/1
12.5-25 SUPPOSITORY RECTAL EVERY 4 HOURS PRN
Status: DISCONTINUED | OUTPATIENT
Start: 2024-08-19 | End: 2024-08-23 | Stop reason: HOSPADM

## 2024-08-19 RX ORDER — LORAZEPAM 1 MG/1
1 TABLET ORAL EVERY 4 HOURS PRN
Status: DISCONTINUED | OUTPATIENT
Start: 2024-08-19 | End: 2024-08-23 | Stop reason: HOSPADM

## 2024-08-19 RX ORDER — GABAPENTIN 250 MG/5ML
100 SOLUTION ORAL 3 TIMES DAILY PRN
COMMUNITY
Start: 2024-07-22 | End: 2026-07-22

## 2024-08-19 RX ORDER — LABETALOL 200 MG/1
200 TABLET, FILM COATED ORAL EVERY 6 HOURS PRN
Status: DISCONTINUED | OUTPATIENT
Start: 2024-08-19 | End: 2024-08-23 | Stop reason: HOSPADM

## 2024-08-19 RX ORDER — PROCHLORPERAZINE EDISYLATE 5 MG/ML
5-10 INJECTION INTRAMUSCULAR; INTRAVENOUS EVERY 4 HOURS PRN
Status: DISCONTINUED | OUTPATIENT
Start: 2024-08-19 | End: 2024-08-23 | Stop reason: HOSPADM

## 2024-08-19 RX ORDER — SERTRALINE HYDROCHLORIDE 25 MG/1
25 TABLET, FILM COATED ORAL DAILY
COMMUNITY
Start: 2024-07-22 | End: 2026-07-22

## 2024-08-19 RX ORDER — AMOXICILLIN 250 MG
2 CAPSULE ORAL EVERY EVENING
Status: DISCONTINUED | OUTPATIENT
Start: 2024-08-19 | End: 2024-08-23 | Stop reason: HOSPADM

## 2024-08-19 RX ORDER — OXYCODONE HYDROCHLORIDE 5 MG/1
5 TABLET ORAL
Status: DISCONTINUED | OUTPATIENT
Start: 2024-08-19 | End: 2024-08-23 | Stop reason: HOSPADM

## 2024-08-19 RX ORDER — MORPHINE SULFATE 4 MG/ML
4 INJECTION INTRAVENOUS
Status: DISCONTINUED | OUTPATIENT
Start: 2024-08-19 | End: 2024-08-23 | Stop reason: HOSPADM

## 2024-08-19 RX ORDER — MORPHINE SULFATE 4 MG/ML
4 INJECTION INTRAVENOUS ONCE
Status: COMPLETED | OUTPATIENT
Start: 2024-08-19 | End: 2024-08-19

## 2024-08-19 RX ORDER — OXYCODONE HYDROCHLORIDE 10 MG/1
10 TABLET ORAL
Status: DISCONTINUED | OUTPATIENT
Start: 2024-08-19 | End: 2024-08-23 | Stop reason: HOSPADM

## 2024-08-19 RX ORDER — LORAZEPAM 2 MG/ML
2 INJECTION INTRAMUSCULAR
Status: DISCONTINUED | OUTPATIENT
Start: 2024-08-19 | End: 2024-08-23 | Stop reason: HOSPADM

## 2024-08-19 RX ORDER — ACETAMINOPHEN 325 MG/1
650 TABLET ORAL EVERY 6 HOURS PRN
Status: DISCONTINUED | OUTPATIENT
Start: 2024-08-19 | End: 2024-08-23 | Stop reason: HOSPADM

## 2024-08-19 RX ORDER — LORAZEPAM 2 MG/ML
1.5 INJECTION INTRAMUSCULAR
Status: DISCONTINUED | OUTPATIENT
Start: 2024-08-19 | End: 2024-08-23 | Stop reason: HOSPADM

## 2024-08-19 RX ORDER — POLYETHYLENE GLYCOL 3350 17 G/17G
1 POWDER, FOR SOLUTION ORAL
Status: DISCONTINUED | OUTPATIENT
Start: 2024-08-19 | End: 2024-08-23 | Stop reason: HOSPADM

## 2024-08-19 RX ORDER — ONDANSETRON 2 MG/ML
4 INJECTION INTRAMUSCULAR; INTRAVENOUS EVERY 4 HOURS PRN
Status: DISCONTINUED | OUTPATIENT
Start: 2024-08-19 | End: 2024-08-23 | Stop reason: HOSPADM

## 2024-08-19 RX ORDER — FLUCONAZOLE 40 MG/ML
5 POWDER, FOR SUSPENSION ORAL DAILY
Status: ON HOLD | COMMUNITY
Start: 2024-07-22 | End: 2024-08-23

## 2024-08-19 RX ORDER — FOLIC ACID 1 MG/1
1 TABLET ORAL DAILY
Status: COMPLETED | OUTPATIENT
Start: 2024-08-19 | End: 2024-08-22

## 2024-08-19 RX ORDER — AMOXICILLIN AND CLAVULANATE POTASSIUM 400; 57 MG/5ML; MG/5ML
11 POWDER, FOR SUSPENSION ORAL 2 TIMES DAILY
Status: ON HOLD | COMMUNITY
Start: 2024-07-22 | End: 2024-08-23

## 2024-08-19 RX ORDER — LORAZEPAM 2 MG/ML
1 INJECTION INTRAMUSCULAR
Status: DISCONTINUED | OUTPATIENT
Start: 2024-08-19 | End: 2024-08-23 | Stop reason: HOSPADM

## 2024-08-19 RX ORDER — ONDANSETRON 2 MG/ML
INJECTION INTRAMUSCULAR; INTRAVENOUS
Status: COMPLETED
Start: 2024-08-19 | End: 2024-08-19

## 2024-08-19 RX ORDER — ACETAMINOPHEN 160 MG/5ML
20 LIQUID ORAL EVERY 6 HOURS PRN
COMMUNITY
Start: 2024-07-22 | End: 2026-07-22

## 2024-08-19 RX ORDER — PROMETHAZINE HYDROCHLORIDE 25 MG/1
12.5-25 TABLET ORAL EVERY 4 HOURS PRN
Status: DISCONTINUED | OUTPATIENT
Start: 2024-08-19 | End: 2024-08-23 | Stop reason: HOSPADM

## 2024-08-19 RX ADMIN — SODIUM CHLORIDE, POTASSIUM CHLORIDE, SODIUM LACTATE AND CALCIUM CHLORIDE 1000 ML: 600; 310; 30; 20 INJECTION, SOLUTION INTRAVENOUS at 03:14

## 2024-08-19 RX ADMIN — LORAZEPAM 1 MG: 1 TABLET ORAL at 23:47

## 2024-08-19 RX ADMIN — MORPHINE SULFATE 4 MG: 4 INJECTION, SOLUTION INTRAMUSCULAR; INTRAVENOUS at 03:19

## 2024-08-19 RX ADMIN — LORAZEPAM 2 MG: 2 INJECTION INTRAMUSCULAR; INTRAVENOUS at 03:17

## 2024-08-19 RX ADMIN — FAMOTIDINE 20 MG: 10 INJECTION, SOLUTION INTRAVENOUS at 03:18

## 2024-08-19 RX ADMIN — LORAZEPAM 3 MG: 1 TABLET ORAL at 22:37

## 2024-08-19 RX ADMIN — Medication 100 MG: at 05:17

## 2024-08-19 RX ADMIN — FAMOTIDINE 20 MG: 10 INJECTION, SOLUTION INTRAVENOUS at 05:17

## 2024-08-19 RX ADMIN — LORAZEPAM 2 MG: 2 TABLET ORAL at 10:55

## 2024-08-19 RX ADMIN — LORAZEPAM 2 MG: 2 INJECTION INTRAMUSCULAR; INTRAVENOUS at 04:31

## 2024-08-19 RX ADMIN — OXYCODONE HYDROCHLORIDE 5 MG: 5 TABLET ORAL at 17:26

## 2024-08-19 RX ADMIN — PANTOPRAZOLE SODIUM 40 MG: 40 INJECTION, POWDER, FOR SOLUTION INTRAVENOUS at 06:26

## 2024-08-19 RX ADMIN — SODIUM CHLORIDE, POTASSIUM CHLORIDE, SODIUM LACTATE AND CALCIUM CHLORIDE 1000 ML: 600; 310; 30; 20 INJECTION, SOLUTION INTRAVENOUS at 03:30

## 2024-08-19 RX ADMIN — OXYCODONE HYDROCHLORIDE 10 MG: 10 TABLET ORAL at 06:49

## 2024-08-19 RX ADMIN — ONDANSETRON 4 MG: 2 INJECTION INTRAMUSCULAR; INTRAVENOUS at 03:15

## 2024-08-19 RX ADMIN — LORAZEPAM 3 MG: 1 TABLET ORAL at 09:00

## 2024-08-19 RX ADMIN — POTASSIUM CHLORIDE AND SODIUM CHLORIDE: 900; 300 INJECTION, SOLUTION INTRAVENOUS at 17:22

## 2024-08-19 RX ADMIN — SODIUM CHLORIDE, POTASSIUM CHLORIDE, SODIUM LACTATE AND CALCIUM CHLORIDE 1000 ML: 600; 310; 30; 20 INJECTION, SOLUTION INTRAVENOUS at 05:11

## 2024-08-19 RX ADMIN — FOLIC ACID 1 MG: 1 TABLET ORAL at 05:17

## 2024-08-19 RX ADMIN — LORAZEPAM 2 MG: 2 TABLET ORAL at 16:02

## 2024-08-19 RX ADMIN — IOHEXOL 100 ML: 350 INJECTION, SOLUTION INTRAVENOUS at 04:01

## 2024-08-19 RX ADMIN — OXYCODONE HYDROCHLORIDE 5 MG: 5 TABLET ORAL at 21:04

## 2024-08-19 RX ADMIN — PANTOPRAZOLE SODIUM 40 MG: 40 INJECTION, POWDER, FOR SOLUTION INTRAVENOUS at 17:23

## 2024-08-19 RX ADMIN — THERA TABS 1 TABLET: TAB at 05:17

## 2024-08-19 RX ADMIN — ONDANSETRON 4 MG: 4 TABLET, ORALLY DISINTEGRATING ORAL at 09:01

## 2024-08-19 RX ADMIN — LORAZEPAM 3 MG: 1 TABLET ORAL at 12:21

## 2024-08-19 RX ADMIN — POTASSIUM CHLORIDE AND SODIUM CHLORIDE 1000 ML: 900; 300 INJECTION, SOLUTION INTRAVENOUS at 06:29

## 2024-08-19 SDOH — ECONOMIC STABILITY: TRANSPORTATION INSECURITY
IN THE PAST 12 MONTHS, HAS THE LACK OF TRANSPORTATION KEPT YOU FROM MEDICAL APPOINTMENTS OR FROM GETTING MEDICATIONS?: NO

## 2024-08-19 SDOH — ECONOMIC STABILITY: TRANSPORTATION INSECURITY
IN THE PAST 12 MONTHS, HAS LACK OF RELIABLE TRANSPORTATION KEPT YOU FROM MEDICAL APPOINTMENTS, MEETINGS, WORK OR FROM GETTING THINGS NEEDED FOR DAILY LIVING?: NO

## 2024-08-19 ASSESSMENT — LIFESTYLE VARIABLES
AUDITORY DISTURBANCES: NOT PRESENT
NAUSEA AND VOMITING: MILD NAUSEA WITH NO VOMITING
AGITATION: SOMEWHAT MORE THAN NORMAL ACTIVITY
HEADACHE, FULLNESS IN HEAD: MILD
VISUAL DISTURBANCES: NOT PRESENT
TOTAL SCORE: VERY MILD ITCHING, PINS AND NEEDLES SENSATION, BURNING OR NUMBNESS
TOTAL SCORE: 3
PAROXYSMAL SWEATS: BARELY PERCEPTIBLE SWEATING. PALMS MOIST
ORIENTATION AND CLOUDING OF SENSORIUM: ORIENTED AND CAN DO SERIAL ADDITIONS
AUDITORY DISTURBANCES: NOT PRESENT
ANXIETY: *
TACTILE DISTURBANCES: VERY MILD ITCHING, PINS AND NEEDLES SENSATION, BURNING OR NUMBNESS
PAROXYSMAL SWEATS: BARELY PERCEPTIBLE SWEATING. PALMS MOIST
AUDITORY DISTURBANCES: NOT PRESENT
NAUSEA AND VOMITING: NO NAUSEA AND NO VOMITING
HEADACHE, FULLNESS IN HEAD: MODERATE
TREMOR: MODERATE TREMOR WITH ARMS EXTENDED
TOTAL SCORE: 15
ORIENTATION AND CLOUDING OF SENSORIUM: ORIENTED AND CAN DO SERIAL ADDITIONS
TOTAL SCORE: 3
ORIENTATION AND CLOUDING OF SENSORIUM: ORIENTED AND CAN DO SERIAL ADDITIONS
AGITATION: NORMAL ACTIVITY
VISUAL DISTURBANCES: NOT PRESENT
AUDITORY DISTURBANCES: NOT PRESENT
HAVE YOU EVER FELT YOU SHOULD CUT DOWN ON YOUR DRINKING: YES
VISUAL DISTURBANCES: NOT PRESENT
VISUAL DISTURBANCES: NOT PRESENT
ORIENTATION AND CLOUDING OF SENSORIUM: ORIENTED AND CAN DO SERIAL ADDITIONS
ORIENTATION AND CLOUDING OF SENSORIUM: ORIENTED AND CAN DO SERIAL ADDITIONS
ANXIETY: MODERATELY ANXIOUS OR GUARDED, SO ANXIETY IS INFERRED
TOTAL SCORE: MODERATE ITCHING, PINS AND NEEDLES SENSATION, BURNING OR NUMBNESS
TOTAL SCORE: 8
AGITATION: SOMEWHAT MORE THAN NORMAL ACTIVITY
TOTAL SCORE: 7
HEADACHE, FULLNESS IN HEAD: MILD
PAROXYSMAL SWEATS: BARELY PERCEPTIBLE SWEATING. PALMS MOIST
ANXIETY: MILDLY ANXIOUS
AGITATION: MODERATELY FIDGETY AND RESTLESS
HEADACHE, FULLNESS IN HEAD: MILD
VISUAL DISTURBANCES: NOT PRESENT
TREMOR: *
VISUAL DISTURBANCES: NOT PRESENT
AVERAGE NUMBER OF DAYS PER WEEK YOU HAVE A DRINK CONTAINING ALCOHOL: 4
CONSUMPTION TOTAL: POSITIVE
TREMOR: NO TREMOR
TOTAL SCORE: 7
TOTAL SCORE: 8
TREMOR: *
NAUSEA AND VOMITING: NO NAUSEA AND NO VOMITING
NAUSEA AND VOMITING: MILD NAUSEA WITH NO VOMITING
AGITATION: SOMEWHAT MORE THAN NORMAL ACTIVITY
TREMOR: *
ANXIETY: MILDLY ANXIOUS
NAUSEA AND VOMITING: NO NAUSEA AND NO VOMITING
NAUSEA AND VOMITING: NO NAUSEA AND NO VOMITING
PAROXYSMAL SWEATS: BARELY PERCEPTIBLE SWEATING. PALMS MOIST
VISUAL DISTURBANCES: NOT PRESENT
ORIENTATION AND CLOUDING OF SENSORIUM: ORIENTED AND CAN DO SERIAL ADDITIONS
ANXIETY: MODERATELY ANXIOUS OR GUARDED, SO ANXIETY IS INFERRED
PAROXYSMAL SWEATS: BARELY PERCEPTIBLE SWEATING. PALMS MOIST
HEADACHE, FULLNESS IN HEAD: MODERATE
ORIENTATION AND CLOUDING OF SENSORIUM: ORIENTED AND CAN DO SERIAL ADDITIONS
HEADACHE, FULLNESS IN HEAD: MILD
TOTAL SCORE: 9
AUDITORY DISTURBANCES: NOT PRESENT
DOES PATIENT WANT TO STOP DRINKING: NO
TREMOR: TREMOR NOT VISIBLE BUT CAN BE FELT, FINGERTIP TO FINGERTIP
PAROXYSMAL SWEATS: NO SWEAT VISIBLE
PAROXYSMAL SWEATS: *
TOTAL SCORE: 14
ANXIETY: MODERATELY ANXIOUS OR GUARDED, SO ANXIETY IS INFERRED
SUBSTANCE_ABUSE: 0
AUDITORY DISTURBANCES: NOT PRESENT
HEADACHE, FULLNESS IN HEAD: MILD
EVER FELT BAD OR GUILTY ABOUT YOUR DRINKING: YES
AUDITORY DISTURBANCES: NOT PRESENT
ALCOHOL_USE: YES
AGITATION: *
AUDITORY DISTURBANCES: NOT PRESENT
NAUSEA AND VOMITING: MILD NAUSEA WITH NO VOMITING
TOTAL SCORE: 12
EVER HAD A DRINK FIRST THING IN THE MORNING TO STEADY YOUR NERVES TO GET RID OF A HANGOVER: NO
AUDITORY DISTURBANCES: NOT PRESENT
AGITATION: SOMEWHAT MORE THAN NORMAL ACTIVITY
PAROXYSMAL SWEATS: *
ORIENTATION AND CLOUDING OF SENSORIUM: ORIENTED AND CAN DO SERIAL ADDITIONS
HEADACHE, FULLNESS IN HEAD: MILD
AUDITORY DISTURBANCES: NOT PRESENT
TOTAL SCORE: VERY MILD ITCHING, PINS AND NEEDLES SENSATION, BURNING OR NUMBNESS
PAROXYSMAL SWEATS: NO SWEAT VISIBLE
TACTILE DISTURBANCES: VERY MILD ITCHING, PINS AND NEEDLES SENSATION, BURNING OR NUMBNESS
VISUAL DISTURBANCES: NOT PRESENT
PAROXYSMAL SWEATS: BARELY PERCEPTIBLE SWEATING. PALMS MOIST
HEADACHE, FULLNESS IN HEAD: MILD
TREMOR: *
AGITATION: NORMAL ACTIVITY
NAUSEA AND VOMITING: INTERMITTENT NAUSEA WITH DRY HEAVES
AGITATION: *
AUDITORY DISTURBANCES: NOT PRESENT
ANXIETY: MODERATELY ANXIOUS OR GUARDED, SO ANXIETY IS INFERRED
ANXIETY: NO ANXIETY (AT EASE)
TREMOR: *
TOTAL SCORE: VERY MILD ITCHING, PINS AND NEEDLES SENSATION, BURNING OR NUMBNESS
TOTAL SCORE: 3
ANXIETY: MODERATELY ANXIOUS OR GUARDED, SO ANXIETY IS INFERRED
ANXIETY: MODERATELY ANXIOUS OR GUARDED, SO ANXIETY IS INFERRED
PAROXYSMAL SWEATS: BARELY PERCEPTIBLE SWEATING. PALMS MOIST
TOTAL SCORE: 19
TREMOR: *
NAUSEA AND VOMITING: MILD NAUSEA WITH NO VOMITING
TOTAL SCORE: 15
HEADACHE, FULLNESS IN HEAD: MODERATE
ANXIETY: *
VISUAL DISTURBANCES: NOT PRESENT
AUDITORY DISTURBANCES: NOT PRESENT
ORIENTATION AND CLOUDING OF SENSORIUM: ORIENTED AND CAN DO SERIAL ADDITIONS
TOTAL SCORE: 15
NAUSEA AND VOMITING: MILD NAUSEA WITH NO VOMITING
HEADACHE, FULLNESS IN HEAD: MILD
TOTAL SCORE: 15
VISUAL DISTURBANCES: VERY MILD SENSITIVITY
PAROXYSMAL SWEATS: NO SWEAT VISIBLE
HEADACHE, FULLNESS IN HEAD: MODERATE
VISUAL DISTURBANCES: NOT PRESENT
ANXIETY: *
ON A TYPICAL DAY WHEN YOU DRINK ALCOHOL HOW MANY DRINKS DO YOU HAVE: 6
HOW MANY TIMES IN THE PAST YEAR HAVE YOU HAD 5 OR MORE DRINKS IN A DAY: 15
TREMOR: TREMOR NOT VISIBLE BUT CAN BE FELT, FINGERTIP TO FINGERTIP
AGITATION: SOMEWHAT MORE THAN NORMAL ACTIVITY
HAVE PEOPLE ANNOYED YOU BY CRITICIZING YOUR DRINKING: YES
TREMOR: MODERATE TREMOR WITH ARMS EXTENDED
NAUSEA AND VOMITING: NO NAUSEA AND NO VOMITING
NAUSEA AND VOMITING: NO NAUSEA AND NO VOMITING
TREMOR: TREMOR NOT VISIBLE BUT CAN BE FELT, FINGERTIP TO FINGERTIP
AGITATION: SOMEWHAT MORE THAN NORMAL ACTIVITY
VISUAL DISTURBANCES: NOT PRESENT
AGITATION: SOMEWHAT MORE THAN NORMAL ACTIVITY
ORIENTATION AND CLOUDING OF SENSORIUM: ORIENTED AND CAN DO SERIAL ADDITIONS
TACTILE DISTURBANCES: VERY MILD ITCHING, PINS AND NEEDLES SENSATION, BURNING OR NUMBNESS
TOTAL SCORE: MODERATE ITCHING, PINS AND NEEDLES SENSATION, BURNING OR NUMBNESS
TACTILE DISTURBANCES: VERY MILD ITCHING, PINS AND NEEDLES SENSATION, BURNING OR NUMBNESS

## 2024-08-19 ASSESSMENT — ENCOUNTER SYMPTOMS
BLURRED VISION: 0
FEVER: 0
COUGH: 0
SPUTUM PRODUCTION: 0
VOMITING: 1
FLANK PAIN: 0
HEARTBURN: 0
WEIGHT LOSS: 0
CHILLS: 0
ABDOMINAL PAIN: 1
POLYDIPSIA: 0
HEADACHES: 0
NECK PAIN: 0
BRUISES/BLEEDS EASILY: 0
FOCAL WEAKNESS: 0
SPEECH CHANGE: 0
DOUBLE VISION: 0
NAUSEA: 1
CONSTIPATION: 0
PHOTOPHOBIA: 0
BACK PAIN: 0
TREMORS: 0
ORTHOPNEA: 0
NERVOUS/ANXIOUS: 0
HALLUCINATIONS: 0
PALPITATIONS: 0
HEMOPTYSIS: 0
DIARRHEA: 0

## 2024-08-19 ASSESSMENT — PAIN DESCRIPTION - PAIN TYPE
TYPE: ACUTE PAIN

## 2024-08-19 ASSESSMENT — SOCIAL DETERMINANTS OF HEALTH (SDOH)
WITHIN THE LAST YEAR, HAVE TO BEEN RAPED OR FORCED TO HAVE ANY KIND OF SEXUAL ACTIVITY BY YOUR PARTNER OR EX-PARTNER?: NO
WITHIN THE PAST 12 MONTHS, THE FOOD YOU BOUGHT JUST DIDN'T LAST AND YOU DIDN'T HAVE MONEY TO GET MORE: NEVER TRUE
WITHIN THE LAST YEAR, HAVE YOU BEEN KICKED, HIT, SLAPPED, OR OTHERWISE PHYSICALLY HURT BY YOUR PARTNER OR EX-PARTNER?: NO
IN THE PAST 12 MONTHS, HAS THE ELECTRIC, GAS, OIL, OR WATER COMPANY THREATENED TO SHUT OFF SERVICE IN YOUR HOME?: NO
WITHIN THE LAST YEAR, HAVE YOU BEEN AFRAID OF YOUR PARTNER OR EX-PARTNER?: NO
WITHIN THE PAST 12 MONTHS, YOU WORRIED THAT YOUR FOOD WOULD RUN OUT BEFORE YOU GOT THE MONEY TO BUY MORE: NEVER TRUE
WITHIN THE LAST YEAR, HAVE YOU BEEN HUMILIATED OR EMOTIONALLY ABUSED IN OTHER WAYS BY YOUR PARTNER OR EX-PARTNER?: NO

## 2024-08-19 ASSESSMENT — COGNITIVE AND FUNCTIONAL STATUS - GENERAL
DAILY ACTIVITIY SCORE: 24
SUGGESTED CMS G CODE MODIFIER MOBILITY: CH
SUGGESTED CMS G CODE MODIFIER DAILY ACTIVITY: CH
MOBILITY SCORE: 24

## 2024-08-19 ASSESSMENT — PATIENT HEALTH QUESTIONNAIRE - PHQ9
SUM OF ALL RESPONSES TO PHQ9 QUESTIONS 1 AND 2: 0
1. LITTLE INTEREST OR PLEASURE IN DOING THINGS: NOT AT ALL
2. FEELING DOWN, DEPRESSED, IRRITABLE, OR HOPELESS: NOT AT ALL

## 2024-08-19 ASSESSMENT — FIBROSIS 4 INDEX: FIB4 SCORE: 1.11

## 2024-08-19 NOTE — ASSESSMENT & PLAN NOTE
Lactic acid 8.4.  Probably secondary to hypovolemia due to nausea and vomiting  Given 2 L of Ringer lactate, repeat lactic 5.3 improving    Resolved

## 2024-08-19 NOTE — H&P
Hospital Medicine History & Physical Note    Date of Service  8/19/2024    Primary Care Physician  Pcp Pt States None      Code Status  Full Code    Chief Complaint  Chief Complaint   Patient presents with    N/V     Pt states he feels he is withdrawing from alcohol his last drink was at 2200 last night, he has been vomiting since 2300 last night. Pt reports he has had seizures after withdrawing in the past.        History of Presenting Illness  Praful Carmen is a 24 y.o. male with history of ongoing alcohol abuse, seizure, esophageal perforation secondary to vomiting treated conservatively in July 2024 who presented 8/19/2024 with complaints of intractable nausea and vomiting since 11 PM last night  Upon presentation he is tachycardic with CIWA score 14.    Lactic acid 8.4, WBC 18.6, glucose 153, CO2 16, anion gap 30, diagnostic alcohol level 14.9, lipase is not elevated.  He received 4 mg of Ativan IV, 2 L of Ringer lactate  Discussed case with Dr. Dickens.  Repeat lactic acid 5.3.  Will admit to telemetry  I discussed the plan of care with patient and ERP .    Review of Systems  Review of Systems   Constitutional:  Negative for chills, fever and weight loss.   HENT:  Negative for ear pain, hearing loss and tinnitus.    Eyes:  Negative for blurred vision, double vision and photophobia.   Respiratory:  Negative for cough, hemoptysis and sputum production.    Cardiovascular:  Negative for chest pain, palpitations and orthopnea.   Gastrointestinal:  Positive for abdominal pain, nausea and vomiting. Negative for constipation, diarrhea and heartburn.   Genitourinary:  Negative for dysuria, flank pain, frequency and hematuria.   Musculoskeletal:  Negative for back pain, joint pain and neck pain.   Skin:  Negative for itching and rash.   Neurological:  Negative for tremors, speech change, focal weakness and headaches.   Endo/Heme/Allergies:  Negative for environmental allergies and polydipsia. Does not bruise/bleed  easily.   Psychiatric/Behavioral:  Negative for hallucinations and substance abuse. The patient is not nervous/anxious.        Past Medical History   has no past medical history on file.    Surgical History   has a past surgical history that includes submandible abscess incision and drainage (Left, 6/7/2017).     Family History  family history is not on file.   Family history reviewed with patient. There is no family history that is pertinent to the chief complaint.     Social History   reports that he has never smoked. He has been exposed to tobacco smoke. He has never used smokeless tobacco. He reports current alcohol use. He reports current drug use. Drug: Inhaled.    Allergies  No Known Allergies    Medications  Prior to Admission Medications   Prescriptions Last Dose Informant Patient Reported? Taking?   acetaminophen (TYLENOL) 500 MG Tab   Yes No   Sig: Take 2 Tabs by mouth every 6 hours.   Patient not taking: Reported on 3/25/2024   ferrous sulfate 325 (65 FE) MG EC tablet   Yes No   Sig: Take 1 Tab by mouth every day.   Patient not taking: Reported on 3/25/2024   ibuprofen (MOTRIN) 800 MG Tab   Yes No   Sig: Take 1 Tab by mouth every 8 hours as needed for Moderate Pain.   Patient not taking: Reported on 3/25/2024   oxycodone immediate-release (ROXICODONE) 5 MG Tab   No No   Sig: Take 1-2 Tabs by mouth every 8 hours as needed for Severe Pain.   Patient not taking: Reported on 3/25/2024      Facility-Administered Medications: None       Physical Exam  Temp:  [36.8 °C (98.2 °F)] 36.8 °C (98.2 °F)  Pulse:  [] 99  Resp:  [15-25] 19  BP: (132-175)/() 140/77  SpO2:  [93 %-98 %] 93 %  Blood Pressure: (!) 140/77   Temperature: 36.8 °C (98.2 °F)   Pulse: 99   Respiration: 19   Pulse Oximetry: 93 %       Physical Exam  Vitals and nursing note reviewed.   Constitutional:       General: He is not in acute distress.     Appearance: Normal appearance. He is ill-appearing.   HENT:      Head: Normocephalic and  "atraumatic.      Nose: Nose normal.      Mouth/Throat:      Mouth: Mucous membranes are moist.   Eyes:      Extraocular Movements: Extraocular movements intact.      Pupils: Pupils are equal, round, and reactive to light.   Cardiovascular:      Rate and Rhythm: Regular rhythm. Tachycardia present.   Pulmonary:      Effort: Pulmonary effort is normal.      Breath sounds: Normal breath sounds.   Abdominal:      General: Abdomen is flat. There is no distension.      Tenderness: There is no abdominal tenderness.   Musculoskeletal:         General: No swelling or deformity. Normal range of motion.      Cervical back: Normal range of motion and neck supple.   Skin:     General: Skin is warm and dry.   Neurological:      General: No focal deficit present.      Mental Status: He is alert and oriented to person, place, and time.   Psychiatric:         Mood and Affect: Mood normal.         Behavior: Behavior normal.       Laboratory:  Recent Labs     08/19/24  0302   WBC 18.6*   RBC 5.78   HEMOGLOBIN 17.3   HEMATOCRIT 47.6   MCV 82.4   MCH 29.9   MCHC 36.3   RDW 37.4   PLATELETCT 351   MPV 9.8     Recent Labs     08/19/24  0302   SODIUM 140   POTASSIUM 3.4*   CHLORIDE 94*   CO2 16*   GLUCOSE 153*   BUN 11   CREATININE 1.07   CALCIUM 9.9     Recent Labs     08/19/24  0302   ALTSGPT 44   ASTSGOT 43   ALKPHOSPHAT 100*   TBILIRUBIN 1.3   LIPASE 30   GLUCOSE 153*         No results for input(s): \"NTPROBNP\" in the last 72 hours.      No results for input(s): \"TROPONINT\" in the last 72 hours.    Imaging:  CT-ABDOMEN-PELVIS WITH   Final Result         1. No acute process.   2. Hepatic steatosis.      DX-CHEST-PORTABLE (1 VIEW)   Final Result      No acute process.          X-Ray:  I have personally reviewed the images and compared with prior images.    Assessment/Plan:  Justification for Admission Status  I anticipate this patient will require at least two midnights for appropriate medical management, necessitating inpatient " admission because intractable nausea vomiting, alcohol withdrawal    Patient will need a Telemetry bed on EMERGENCY service .  The need is secondary to alcohol withdrawal.    * Alcohol withdrawal (HCC)  Assessment & Plan  Last alcoholic beverage was last night, presented with nausea vomiting tremulousness.  History of alcohol withdrawal seizure, last time 1 month ago  Plan: Ativan per [facility] protocol  Thiamine supplementation  Monitor and replace electrolytes  Fall, aspiration, seizure precautions  Alcohol cessation counseling by discharge    Hypokalemia  Assessment & Plan  Potassium 3.4  Replacement ordered    Lactic acid acidosis  Assessment & Plan  Lactic acid 8.4.  Probably secondary to hypovolemia due to nausea and vomiting  Given 2 L of Ringer lactate, repeat lactic 5.3 improving  Thiamine supplementation.  Continue IV hydration    SIRS (systemic inflammatory response syndrome) ([facility])  Assessment & Plan  SIRS criteria identified on my evaluation include:  Tachycardia, with heart rate greater than 90 BPM and Leukocytosis, with WBC greater than 12,000    No infection identified on CT of the abdomen or chest x-ray    Continue IV hydration and observation, repeat lactic acid        VTE prophylaxis: enoxaparin ppx

## 2024-08-19 NOTE — ED TRIAGE NOTES
Chief Complaint   Patient presents with    N/V     Pt states he feels he is withdrawing from alcohol his last drink was at 2200 last night, he has been vomiting since 2300 last night. Pt reports he has had seizures after withdrawing in the past.      Pt ambulatory to triage for above complaint. Pt states he drinks everyday and tried to quit again last night.      Pt is alert/oriented and follows commands. Pt speaking in full sentences and responds appropriately to questions. Pt is shaking and respirations are rapid, and pt is tachycardiac. .     Pt placed in lobby and educated on triage process. Pt encouraged to alert staff for any changes in condition.

## 2024-08-19 NOTE — ED NOTES
Unable to obtain med rec at this time  Patient requested that we contact girlfriend in AM for med list (at work)    Komal Breaux CPhT

## 2024-08-19 NOTE — ASSESSMENT & PLAN NOTE
SIRS criteria identified on my evaluation include:  Tachycardia, with heart rate greater than 90 BPM and Leukocytosis, with WBC greater than 12,000    No infection identified on CT of the abdomen or chest x-ray    Continue to treat alcohol withdrawal

## 2024-08-19 NOTE — ED NOTES
Received a call from Lab called with critical result of Lactic Acid at 8.4 mmol/L.  Dr. Dickens notified of critical lab result for Lactic Acid of 8.4 mmol/L.  Critical lab result acknowledged by the ERP

## 2024-08-19 NOTE — ED NOTES
ERP at bedside    Last alcohol intake: 8/18/2024 at 1700  Symptoms started at 1900  Sober up from alcohol for 3 months

## 2024-08-19 NOTE — PROGRESS NOTES
Med Rec complete per historical   Allergies reviewed and updated Yes  Antibiotics within last 30 Days yes Augmentin   Last Dose unknown Pt picked up but hasn't completed and is unsure of when he took it  Anticoagulants within last 14 days no  Patient home pharmacy [facility]    Pt is unsure of medications he is currently taking or last doses. Unsure if he started or finished Fluconazole. Has not completed Augmentin. Not sure of if he has taken any Naltrexone. Pt reports taking Zoloft yesterday morning. Medications on med rec are from chart review thru [facility]

## 2024-08-19 NOTE — ED PROVIDER NOTES
ED Provider Note    CHIEF COMPLAINT  Chief Complaint   Patient presents with    N/V     Pt states he feels he is withdrawing from alcohol his last drink was at 2200 last night, he has been vomiting since 2300 last night. Pt reports he has had seizures after withdrawing in the past.        EXTERNAL RECORDS REVIEWED  Inpatient Notes reviewed discharge summary dated July 22, 2024 by Dr. Rojas.  Patient admitted July 18, discharged on 22nd.  Diagnosed with esophageal perforation.  This was seen on CT imaging demonstrating pneumomediastinum.  Also had acute kidney injury which improved with IV fluids.  Underwent upper GI fluoroscopy that thankfully was reassuring on hospital day 4 and was able to be discharged.    HPI/ROS  LIMITATION TO HISTORY   Select: : None  OUTSIDE HISTORIAN(S):  None available    Praful Carmen is a 24 y.o. male who presents for evaluation of patient has history of episodic alcohol abuse, he relates he relapsed within the last 3 months.  Now drinks hard liquor on a daily basis.  Last drink 10 PM yesterday.  Patient relates persistent nausea, vomiting since 11 PM yesterday.  He has been unable to tolerate any oral intake and notes large volume of emesis.  Relates he is very anxious, is tremulous, sweaty.  He has had seizures from alcohol withdrawal in the past and is concerned for that.  In addition patient suffered perforated esophagus in July, given large amounts of vomiting was concern for recurrence of this and as such she came to be assessed.    PAST MEDICAL HISTORY   Alcohol abuse    SURGICAL HISTORY   has a past surgical history that includes submandible abscess incision and drainage (Left, 6/7/2017).    FAMILY HISTORY  History reviewed. No pertinent family history.    SOCIAL HISTORY  Social History     Tobacco Use    Smoking status: Never     Passive exposure: Yes    Smokeless tobacco: Never   Substance and Sexual Activity    Alcohol use: Yes     Comment: ocassionally    Drug use:  "Yes     Types: Inhaled     Comment: marijuana    Sexual activity: Not on file       CURRENT MEDICATIONS  Home Medications       Reviewed by Lisbet Lopez R.N. (Registered Nurse) on 08/19/24 at 0241  Med List Status: Not Addressed     Medication Last Dose Status   acetaminophen (TYLENOL) 500 MG Tab  Active   ferrous sulfate 325 (65 FE) MG EC tablet  Active   ibuprofen (MOTRIN) 800 MG Tab  Active   oxycodone immediate-release (ROXICODONE) 5 MG Tab  Active                  Audit from Redirected Encounters    **Home medications have not yet been reviewed for this encounter**         ALLERGIES  No Known Allergies    PHYSICAL EXAM  VITAL SIGNS: BP (!) 149/89   Pulse (!) 104   Temp 36.8 °C (98.2 °F) (Temporal)   Resp 20   Ht 1.702 m (5' 7\")   Wt 63.8 kg (140 lb 10.5 oz)   SpO2 93%   BMI 22.03 kg/m²    General: Alert, severe acute distress, appears significantly uncomfortable  Skin: Warm, diaphoretic, mildly pale  Head: Normocephalic, atraumatic  Neck: Trachea midline, no tenderness  Eye: PERRL, normal conjunctiva  ENMT: Oral mucosa dry, no pharyngeal erythema or exudate  Cardiovascular: S1, S2, significantly tachycardic, otherwise regular rate and rhythm, No murmur, Normal peripheral perfusion  Respiratory: Lungs CTA, respirations are non-labored, breath sounds are equal  Gastrointestinal: Soft, tender to epigastrium and periumbilical region with voluntary guarding, no rebound, no rigidity, abdomen nondistended.  Musculoskeletal: No swelling, no deformity.  Carpal spasm noted to the right upper extremity with resolved during my initial assessment  Neurological: Alert and oriented to person, place, time, and situation  Lymphatics: No lymphadenopathy  Psychiatric: Cooperative, anxious, tearful    EKG/LABS  Results for orders placed or performed during the hospital encounter of 08/19/24   DIAGNOSTIC ALCOHOL   Result Value Ref Range    Diagnostic Alcohol 14.9 (H) <10.1 mg/dL   CBC WITH DIFFERENTIAL   Result Value " Ref Range    WBC 18.6 (H) 4.8 - 10.8 K/uL    RBC 5.78 4.70 - 6.10 M/uL    Hemoglobin 17.3 14.0 - 18.0 g/dL    Hematocrit 47.6 42.0 - 52.0 %    MCV 82.4 81.4 - 97.8 fL    MCH 29.9 27.0 - 33.0 pg    MCHC 36.3 32.3 - 36.5 g/dL    RDW 37.4 35.9 - 50.0 fL    Platelet Count 351 164 - 446 K/uL    MPV 9.8 9.0 - 12.9 fL    Neutrophils-Polys 86.50 (H) 44.00 - 72.00 %    Lymphocytes 6.80 (L) 22.00 - 41.00 %    Monocytes 6.10 0.00 - 13.40 %    Eosinophils 0.00 0.00 - 6.90 %    Basophils 0.20 0.00 - 1.80 %    Immature Granulocytes 0.40 0.00 - 0.90 %    Nucleated RBC 0.00 0.00 - 0.20 /100 WBC    Neutrophils (Absolute) 16.03 (H) 1.82 - 7.42 K/uL    Lymphs (Absolute) 1.27 1.00 - 4.80 K/uL    Monos (Absolute) 1.14 (H) 0.00 - 0.85 K/uL    Eos (Absolute) 0.00 0.00 - 0.51 K/uL    Baso (Absolute) 0.04 0.00 - 0.12 K/uL    Immature Granulocytes (abs) 0.08 0.00 - 0.11 K/uL    NRBC (Absolute) 0.00 K/uL   COMP METABOLIC PANEL   Result Value Ref Range    Sodium 140 135 - 145 mmol/L    Potassium 3.4 (L) 3.6 - 5.5 mmol/L    Chloride 94 (L) 96 - 112 mmol/L    Co2 16 (L) 20 - 33 mmol/L    Anion Gap 30.0 (H) 7.0 - 16.0    Glucose 153 (H) 65 - 99 mg/dL    Bun 11 8 - 22 mg/dL    Creatinine 1.07 0.50 - 1.40 mg/dL    Calcium 9.9 8.5 - 10.5 mg/dL    Correct Calcium 8.9 8.5 - 10.5 mg/dL    AST(SGOT) 43 12 - 45 U/L    ALT(SGPT) 44 2 - 50 U/L    Alkaline Phosphatase 100 (H) 30 - 99 U/L    Total Bilirubin 1.3 0.1 - 1.5 mg/dL    Albumin 5.2 (H) 3.2 - 4.9 g/dL    Total Protein 7.0 6.0 - 8.2 g/dL    Globulin 1.8 (L) 1.9 - 3.5 g/dL    A-G Ratio 2.9 g/dL   LIPASE   Result Value Ref Range    Lipase 30 11 - 82 U/L   LACTIC ACID   Result Value Ref Range    Lactic Acid 8.4 (HH) 0.5 - 2.0 mmol/L   ESTIMATED GFR   Result Value Ref Range    GFR (CKD-EPI) 99 >60 mL/min/1.73 m 2   LACTIC ACID   Result Value Ref Range    Lactic Acid 5.3 (HH) 0.5 - 2.0 mmol/L   VENOUS BLOOD GAS   Result Value Ref Range    Venous Bg Ph 7.47 (H) 7.31 - 7.45    Venous Bg Ph Temp Corrected  7.48 (H) 7.31 - 7.45    Venous Bg Pco2 31.5 (L) 41.0 - 51.0 mmHg    Venous Bg Pco2 Temp Corrected 30.8 (L) 41.0 - 51.0 mmHg    Venous Bg Po2 69.0 (H) 25.0 - 40.0 mmHg    Venous Bg Po2 Temp Corrected 66.7 (H) 25.0 - 40.0 mmHg    Venous Bg O2 Saturation 92.0 %    Venous Bg Hco3 23 (L) 24 - 28 mmol/L    Venous Bg Base Excess 0 mmol/L    Body Temp 36.5 Centigrade    O2 Therapy ra    MAGNESIUM   Result Value Ref Range    Magnesium 1.8 1.5 - 2.5 mg/dL   CREATINE KINASE   Result Value Ref Range    CPK Total 310 (H) 0 - 154 U/L      I have independently interpreted this EKG    RADIOLOGY/PROCEDURES   I have independently interpreted the diagnostic imaging associated with this visit and am waiting the final reading from the radiologist.   My preliminary interpretation is as follows: No evidence of obstructive process, no evidence of perforation on CT of abdomen and pelvis.    Radiologist interpretation:  CT-ABDOMEN-PELVIS WITH   Final Result         1. No acute process.   2. Hepatic steatosis.      DX-CHEST-PORTABLE (1 VIEW)   Final Result      No acute process.          COURSE & MEDICAL DECISION MAKING    ASSESSMENT, COURSE AND PLAN  Care Narrative: Tremulous, diaphoretic, and ill in appearance 24-year-old male presents for evaluation of nausea, vomiting, and alcohol withdrawals.  Initial CIWA score is 14.  He is neurovascular intact but obviously significantly volume depleted; he has been vomiting essentially nonstop for the last 12 hours.  Certainly high risk with regard to delirium tremens.  He has had seizures from alcohol withdrawal in the past as well.  Labs are indeed consistent with alcoholic ketosis as well as rather significant volume depletion.  Patient's initial anion gap is 30, initial lactic acid 8.4.  Thankfully VBG is not in the acidotic range, lactic acid level is also improving with IV fluid resuscitation.  CPK mildly elevated but no evidence suggestive of rhabdo myelosis.  Mild hypokalemia but otherwise  reassuring metabolic studies.  CT is obtained given his pain and tenderness and given he has had a previous perforation, thankfully this unremarkable with no evidence of perforated viscus.  Patient will be admitted to medical telemetry, still significantly acutely ill but improving markedly after initial management in the ED.    CRITICAL CARE  The very real possibilty of a deterioration of this patient's condition required the highest level of my preparedness for sudden, emergent intervention.  I provided critical care services, which included medication orders, frequent reevaluations of the patient's condition and response to treatment, ordering and reviewing test results, and discussing the case with various consultants.  The critical care time associated with the care of the patient was 54 minutes. Review chart for interventions. This time is exclusive of any other billable procedures.       ED OBS: Yes; I am placing the patient in to an observation status due to a diagnostic uncertainty as well as therapeutic intensity. Patient placed in observation status at 3:08 AM, 8/19/2024.     Observation plan is as follows: Patient medicated with lorazepam 2 mg IV, morphine 4 mg IV, Zofran 4 mg IV, famotidine 20 mg IV, 2 L of LR.  Metabolic/toxicologic workup as well as lactic acid, lipase, and CT imaging of abdomen and pelvis will be obtained.  Differential diagnosis at this point includes was not restricted to alcohol withdrawal, delirium tremens, alcoholic pancreatitis, alcoholic gastritis, esophagitis, perforated viscus, dehydration, electrolyte derangement, alcoholic ketosis    0426: Labs quite concerning given rather marked lactic acidosis.  This is consistent with likely alcoholic ketosis and severe volume depletion.  Patient is thankfully improving, still tremulous.  Thankfully nausea much improved, vomiting ceased.  CT is reassuring, will attempt p.o. challenge at this time.    0450: Spoke with the hospitalist  "Dr. Holden; he concurs with plan for recheck of lactic acid after his initial fluid boluses.  I have ordered a third L of LR given he is obviously significantly volume depleted.  Hospitalist in addition request VBG to assess with regard to the acidosis which is certainly quite reasonable.  Have ordered this as well.    0558: Lactic acid level is still significantly elevated but thankfully much improved compared to previous, currently 5.3.  VBG thankfully does not demonstrate acidosis, patient does appear to be compensating, pH is in the mildly alkalotic range at 7.47.  Have updated the hospitalist.  Plan for admission to telemetry given improving, no indication for ICU placement at this time.    Upon Reevaluation, the patient's condition has: not improved; and will be escalated to hospitalization.    Patient discharged from ED Observation status at 0600 (Time) 8/19/24 (Date).   Hydration: Based on the patient's presentation of Acute Vomiting, Dehydration, Tachycardia, and Other alcoholic ketosis the patient was given IV fluids. IV Hydration was used because oral hydration was not as rapid as required. Upon recheck following hydration, the patient was doing better, heart rate improving, currently 99.      Patient Vitals for the past 24 hrs:   BP Temp Temp src Pulse Resp SpO2 Height Weight   08/19/24 0530 (!) 149/89 -- -- (!) 104 20 93 % -- --   08/19/24 0500 (!) 143/87 -- -- (!) 106 18 95 % -- --   08/19/24 0432 (!) 140/77 -- -- 99 19 93 % -- --   08/19/24 0400 (!) 144/89 -- -- (!) 103 18 95 % -- --   08/19/24 0347 -- -- -- (!) 110 20 95 % -- --   08/19/24 0332 (!) 150/88 -- -- (!) 112 15 97 % -- --   08/19/24 0324 (!) 149/88 -- -- (!) 122 (!) 22 96 % -- --   08/19/24 0300 (!) 175/99 -- -- (!) 129 (!) 25 97 % -- --   08/19/24 0234 -- -- -- -- -- -- 1.702 m (5' 7\") 63.8 kg (140 lb 10.5 oz)   08/19/24 0233 (!) 132/100 36.8 °C (98.2 °F) Temporal (!) 150 (!) 22 98 % -- --        ADDITIONAL PROBLEMS MANAGED  Alcoholic " ketosis, hyperactive alcohol withdrawal, lactic acidosis, volume depletion from dehydration and vomiting    DISPOSITION AND DISCUSSIONS  I have discussed management of the patient with the following physicians and RADHA's:  Consulted with hospitalist Dr. Holden    Discussion of management with other [facility] or appropriate source(s): None     Escalation of care considered, and ultimately not performed:NA    Barriers to care at this time, including but not limited to: Patient does not have established PCP, Patient had difficult affording medications, and Patient lacks financial resources.     Decision tools and prescription drugs considered including, but not limited to:  Initial CIWA score 14 after my initial assessment .    FINAL DIAGNOSIS  1. Alcoholic ketosis (HCC)    2. Lactic acidosis    3. Alcohol withdrawal delirium, acute, hyperactive (HCC)    4. Nausea and vomiting, unspecified vomiting type    5. Dehydration         Electronically signed by: Bimal Dickens M.D., 8/19/2024 3:07 AM

## 2024-08-19 NOTE — PROGRESS NOTES
Hospital medicine progress note after midnight:     Mr. Praful Carmen is a 24 y.o. male with history of ongoing alcohol abuse, seizure, esophageal perforation secondary to vomiting treated conservatively in July 2024 who presented 8/19/2024 with complaints of intractable nausea and vomiting since 11 PM the night before admission.    Upon presentation he is tachycardic with CIWA score 14.    Lactic acid 8.4, WBC 18.6, glucose 153, CO2 16, anion gap 30, diagnostic alcohol level 14.9, lipase is not elevated.  He received 4 mg of Ativan IV, 2 L of Ringer lactate  Discussed case with Dr. Dickens.  Repeat lactic acid 5.3.  Will admit to telemetry for alcohol withdrawal.    Patient continues to have alcohol withdrawal symptoms with severe anxiety, nausea with no vomiting, and irritability.  CIWA score 15 on assessment.    Plan of care: Continue CIWA protocol; monitor patient for any signs of esophageal perforation; monitor for seizures    Disposition: Patient currently inpatient status as he is anticipated to stay through the 3 midnights for management of alcohol withdrawal    Problem list:   Alcohol withdrawal symptoms  Electrolyte imbalance  Lactic acidosis  Esophageal perforation  Anemia  History of significant alcohol abuse    Please note that this dictation was created using voice recognition software. I have made every reasonable attempt to correct obvious errors, but there may be errors of grammar and possibly content that I did not discover before finalizing the note.    Electronically signed by:  Dr. ANGELY Olmos, DNP, APRN, FNP-C  Hospitalist Services  [facility]  [phone]  [email or web address]  08/19/24                 [id number]

## 2024-08-19 NOTE — ED NOTES
Lucita GARCIA called and said they don't have an available Zoll monitor now. RADHA Bae said [facility]- Er Tech will transfer the pt in the floor

## 2024-08-19 NOTE — ASSESSMENT & PLAN NOTE
Last alcoholic beverage was last night, presented with nausea vomiting tremulousness.  History of alcohol withdrawal seizure, last time 1 month ago    PLAN  -[facility] protocol  -librium taper  -thiamine/folate/MVI  -Monitor and replace electrolytes  -Fall, aspiration, seizure precautions  -Alcohol cessation counseling by discharge  -lidocaine patch and tylenol for pain

## 2024-08-19 NOTE — ED NOTES
Pt resting on gurney, pt in no acute distress, pt provided call light, instructed to call if needing any assistance, instructed not to get up by self, iris in lowest position.

## 2024-08-19 NOTE — ED NOTES
Bedside report received from off going RN: Merrick, assumed care of patient.  POC discussed with patient. Call light within reach, all needs addressed at this time. Pt drinks juice without difficulty.      Fall risk interventions in place: Patient's personal possessions are with in their safe reach, Place fall risk sign on patient's door, and Give patient urinal if applicable (all applicable per [facility] Fall risk assessment)   Continuous monitoring: Cardiac Leads, Pulse Ox, or Blood Pressure  IVF/IV medications: Infusion per MAR (List Med(s)) NaCl with KCL, 100 mL/ hr  Oxygen: Room Air  Bedside sitter: Not Applicable   Isolation: Not Applicable

## 2024-08-20 PROBLEM — F39 MOOD DISORDER (HCC): Status: ACTIVE | Noted: 2024-08-20

## 2024-08-20 PROBLEM — K22.6 MALLORY-WEISS SYNDROME: Status: ACTIVE | Noted: 2024-08-20

## 2024-08-20 PROBLEM — F19.10 DRUG ABUSE (HCC): Status: ACTIVE | Noted: 2024-08-20

## 2024-08-20 PROBLEM — R74.8 ABNORMAL CPK: Status: ACTIVE | Noted: 2024-08-20

## 2024-08-20 LAB
ALBUMIN SERPL BCP-MCNC: 4 G/DL (ref 3.2–4.9)
ALBUMIN/GLOB SERPL: 1.7 G/DL
ALP SERPL-CCNC: 96 U/L (ref 30–99)
ALT SERPL-CCNC: 28 U/L (ref 2–50)
ANION GAP SERPL CALC-SCNC: 12 MMOL/L (ref 7–16)
AST SERPL-CCNC: 35 U/L (ref 12–45)
BASOPHILS # BLD AUTO: 0.5 % (ref 0–1.8)
BASOPHILS # BLD: 0.03 K/UL (ref 0–0.12)
BILIRUB SERPL-MCNC: 1.8 MG/DL (ref 0.1–1.5)
BUN SERPL-MCNC: 7 MG/DL (ref 8–22)
CALCIUM ALBUM COR SERPL-MCNC: 8.9 MG/DL (ref 8.5–10.5)
CALCIUM SERPL-MCNC: 8.9 MG/DL (ref 8.5–10.5)
CHLORIDE SERPL-SCNC: 105 MMOL/L (ref 96–112)
CK SERPL-CCNC: 180 U/L (ref 0–154)
CO2 SERPL-SCNC: 20 MMOL/L (ref 20–33)
CREAT SERPL-MCNC: 0.58 MG/DL (ref 0.5–1.4)
EKG IMPRESSION: NORMAL
EOSINOPHIL # BLD AUTO: 0.08 K/UL (ref 0–0.51)
EOSINOPHIL NFR BLD: 1.2 % (ref 0–6.9)
ERYTHROCYTE [DISTWIDTH] IN BLOOD BY AUTOMATED COUNT: 41.1 FL (ref 35.9–50)
GFR SERPLBLD CREATININE-BSD FMLA CKD-EPI: 138 ML/MIN/1.73 M 2
GLOBULIN SER CALC-MCNC: 2.4 G/DL (ref 1.9–3.5)
GLUCOSE SERPL-MCNC: 80 MG/DL (ref 65–99)
HCT VFR BLD AUTO: 43 % (ref 42–52)
HGB BLD-MCNC: 14.9 G/DL (ref 14–18)
IMM GRANULOCYTES # BLD AUTO: 0.02 K/UL (ref 0–0.11)
IMM GRANULOCYTES NFR BLD AUTO: 0.3 % (ref 0–0.9)
LACTATE SERPL-SCNC: 0.7 MMOL/L (ref 0.5–2)
LYMPHOCYTES # BLD AUTO: 2.5 K/UL (ref 1–4.8)
LYMPHOCYTES NFR BLD: 37.6 % (ref 22–41)
MAGNESIUM SERPL-MCNC: 2.2 MG/DL (ref 1.5–2.5)
MCH RBC QN AUTO: 31 PG (ref 27–33)
MCHC RBC AUTO-ENTMCNC: 34.7 G/DL (ref 32.3–36.5)
MCV RBC AUTO: 89.4 FL (ref 81.4–97.8)
MONOCYTES # BLD AUTO: 0.59 K/UL (ref 0–0.85)
MONOCYTES NFR BLD AUTO: 8.9 % (ref 0–13.4)
NEUTROPHILS # BLD AUTO: 3.43 K/UL (ref 1.82–7.42)
NEUTROPHILS NFR BLD: 51.5 % (ref 44–72)
NRBC # BLD AUTO: 0 K/UL
NRBC BLD-RTO: 0 /100 WBC (ref 0–0.2)
PHOSPHATE SERPL-MCNC: 2.8 MG/DL (ref 2.5–4.5)
PLATELET # BLD AUTO: 197 K/UL (ref 164–446)
PMV BLD AUTO: 10.2 FL (ref 9–12.9)
POTASSIUM SERPL-SCNC: 4.1 MMOL/L (ref 3.6–5.5)
PROT SERPL-MCNC: 6.4 G/DL (ref 6–8.2)
RBC # BLD AUTO: 4.81 M/UL (ref 4.7–6.1)
SODIUM SERPL-SCNC: 137 MMOL/L (ref 135–145)
TROPONIN T SERPL-MCNC: <6 NG/L (ref 6–19)
WBC # BLD AUTO: 6.7 K/UL (ref 4.8–10.8)

## 2024-08-20 PROCEDURE — 82550 ASSAY OF CK (CPK): CPT

## 2024-08-20 PROCEDURE — 93010 ELECTROCARDIOGRAM REPORT: CPT | Performed by: INTERNAL MEDICINE

## 2024-08-20 PROCEDURE — 83735 ASSAY OF MAGNESIUM: CPT

## 2024-08-20 PROCEDURE — 700111 HCHG RX REV CODE 636 W/ 250 OVERRIDE (IP): Performed by: INTERNAL MEDICINE

## 2024-08-20 PROCEDURE — 700101 HCHG RX REV CODE 250: Performed by: INTERNAL MEDICINE

## 2024-08-20 PROCEDURE — 700102 HCHG RX REV CODE 250 W/ 637 OVERRIDE(OP)

## 2024-08-20 PROCEDURE — 770020 HCHG ROOM/CARE - TELE (206)

## 2024-08-20 PROCEDURE — 84100 ASSAY OF PHOSPHORUS: CPT

## 2024-08-20 PROCEDURE — 700102 HCHG RX REV CODE 250 W/ 637 OVERRIDE(OP): Performed by: INTERNAL MEDICINE

## 2024-08-20 PROCEDURE — A9270 NON-COVERED ITEM OR SERVICE: HCPCS | Performed by: INTERNAL MEDICINE

## 2024-08-20 PROCEDURE — 99232 SBSQ HOSP IP/OBS MODERATE 35: CPT | Mod: GC | Performed by: INTERNAL MEDICINE

## 2024-08-20 PROCEDURE — 83605 ASSAY OF LACTIC ACID: CPT

## 2024-08-20 PROCEDURE — A9270 NON-COVERED ITEM OR SERVICE: HCPCS

## 2024-08-20 PROCEDURE — 84484 ASSAY OF TROPONIN QUANT: CPT

## 2024-08-20 PROCEDURE — 36415 COLL VENOUS BLD VENIPUNCTURE: CPT

## 2024-08-20 PROCEDURE — 85025 COMPLETE CBC W/AUTO DIFF WBC: CPT

## 2024-08-20 PROCEDURE — 80053 COMPREHEN METABOLIC PANEL: CPT

## 2024-08-20 RX ORDER — CHLORDIAZEPOXIDE HYDROCHLORIDE 25 MG/1
50 CAPSULE, GELATIN COATED ORAL EVERY 6 HOURS
Status: DISCONTINUED | OUTPATIENT
Start: 2024-08-20 | End: 2024-08-21

## 2024-08-20 RX ORDER — GABAPENTIN 250 MG/5ML
100 SOLUTION ORAL 3 TIMES DAILY PRN
Status: DISCONTINUED | OUTPATIENT
Start: 2024-08-20 | End: 2024-08-23 | Stop reason: HOSPADM

## 2024-08-20 RX ORDER — CHLORDIAZEPOXIDE HYDROCHLORIDE 25 MG/1
25 CAPSULE, GELATIN COATED ORAL ONCE
Status: COMPLETED | OUTPATIENT
Start: 2024-08-20 | End: 2024-08-20

## 2024-08-20 RX ORDER — CHLORDIAZEPOXIDE HYDROCHLORIDE 25 MG/1
25 CAPSULE, GELATIN COATED ORAL EVERY 6 HOURS
Status: DISCONTINUED | OUTPATIENT
Start: 2024-08-21 | End: 2024-08-21

## 2024-08-20 RX ORDER — CHLORDIAZEPOXIDE HYDROCHLORIDE 25 MG/1
25 CAPSULE, GELATIN COATED ORAL EVERY 8 HOURS
Status: DISCONTINUED | OUTPATIENT
Start: 2024-08-20 | End: 2024-08-20

## 2024-08-20 RX ADMIN — LORAZEPAM 2 MG: 2 TABLET ORAL at 17:36

## 2024-08-20 RX ADMIN — LIDOCAINE HYDROCHLORIDE 15 ML: 20 SOLUTION ORAL; TOPICAL at 15:09

## 2024-08-20 RX ADMIN — LORAZEPAM 3 MG: 1 TABLET ORAL at 16:30

## 2024-08-20 RX ADMIN — LORAZEPAM 3 MG: 1 TABLET ORAL at 08:57

## 2024-08-20 RX ADMIN — LORAZEPAM 3 MG: 1 TABLET ORAL at 07:51

## 2024-08-20 RX ADMIN — LORAZEPAM 3 MG: 1 TABLET ORAL at 14:04

## 2024-08-20 RX ADMIN — POTASSIUM CHLORIDE AND SODIUM CHLORIDE: 900; 300 INJECTION, SOLUTION INTRAVENOUS at 03:08

## 2024-08-20 RX ADMIN — OXYCODONE HYDROCHLORIDE 10 MG: 10 TABLET ORAL at 12:24

## 2024-08-20 RX ADMIN — LORAZEPAM 3 MG: 1 TABLET ORAL at 12:00

## 2024-08-20 RX ADMIN — Medication 100 MG: at 05:34

## 2024-08-20 RX ADMIN — CHLORDIAZEPOXIDE HYDROCHLORIDE 25 MG: 25 CAPSULE ORAL at 09:50

## 2024-08-20 RX ADMIN — CHLORDIAZEPOXIDE HYDROCHLORIDE 50 MG: 25 CAPSULE ORAL at 23:57

## 2024-08-20 RX ADMIN — PANTOPRAZOLE SODIUM 40 MG: 40 INJECTION, POWDER, FOR SOLUTION INTRAVENOUS at 05:38

## 2024-08-20 RX ADMIN — CHLORDIAZEPOXIDE HYDROCHLORIDE 50 MG: 25 CAPSULE ORAL at 17:07

## 2024-08-20 RX ADMIN — PANTOPRAZOLE SODIUM 40 MG: 40 INJECTION, POWDER, FOR SOLUTION INTRAVENOUS at 17:07

## 2024-08-20 RX ADMIN — FOLIC ACID 1 MG: 1 TABLET ORAL at 05:34

## 2024-08-20 RX ADMIN — SENNOSIDES AND DOCUSATE SODIUM 2 TABLET: 50; 8.6 TABLET ORAL at 17:07

## 2024-08-20 RX ADMIN — LORAZEPAM 2 MG: 2 TABLET ORAL at 03:36

## 2024-08-20 RX ADMIN — THERA TABS 1 TABLET: TAB at 05:34

## 2024-08-20 RX ADMIN — LORAZEPAM 3 MG: 1 TABLET ORAL at 12:59

## 2024-08-20 RX ADMIN — OXYCODONE HYDROCHLORIDE 10 MG: 10 TABLET ORAL at 19:59

## 2024-08-20 RX ADMIN — LORAZEPAM 3 MG: 1 TABLET ORAL at 09:49

## 2024-08-20 RX ADMIN — POTASSIUM CHLORIDE AND SODIUM CHLORIDE: 900; 300 INJECTION, SOLUTION INTRAVENOUS at 17:12

## 2024-08-20 RX ADMIN — CHLORDIAZEPOXIDE HYDROCHLORIDE 25 MG: 25 CAPSULE ORAL at 12:25

## 2024-08-20 RX ADMIN — LORAZEPAM 3 MG: 1 TABLET ORAL at 15:08

## 2024-08-20 RX ADMIN — LORAZEPAM 3 MG: 1 TABLET ORAL at 19:51

## 2024-08-20 RX ADMIN — LORAZEPAM 2 MG: 2 TABLET ORAL at 05:37

## 2024-08-20 RX ADMIN — LORAZEPAM 2 MG: 2 TABLET ORAL at 21:35

## 2024-08-20 RX ADMIN — SERTRALINE HYDROCHLORIDE 25 MG: 50 TABLET ORAL at 07:51

## 2024-08-20 RX ADMIN — LORAZEPAM 3 MG: 1 TABLET ORAL at 10:51

## 2024-08-20 ASSESSMENT — PAIN DESCRIPTION - PAIN TYPE
TYPE: ACUTE PAIN

## 2024-08-20 ASSESSMENT — LIFESTYLE VARIABLES
NAUSEA AND VOMITING: MILD NAUSEA WITH NO VOMITING
PAROXYSMAL SWEATS: *
NAUSEA AND VOMITING: NO NAUSEA AND NO VOMITING
TOTAL SCORE: 13
ORIENTATION AND CLOUDING OF SENSORIUM: ORIENTED AND CAN DO SERIAL ADDITIONS
PAROXYSMAL SWEATS: *
AUDITORY DISTURBANCES: NOT PRESENT
VISUAL DISTURBANCES: VERY MILD SENSITIVITY
TOTAL SCORE: 14
AGITATION: *
VISUAL DISTURBANCES: NOT PRESENT
AGITATION: SOMEWHAT MORE THAN NORMAL ACTIVITY
NAUSEA AND VOMITING: MILD NAUSEA WITH NO VOMITING
HEADACHE, FULLNESS IN HEAD: MODERATE
NAUSEA AND VOMITING: *
HEADACHE, FULLNESS IN HEAD: MODERATE
AUDITORY DISTURBANCES: VERY MILD HARSHNESS OR ABILITY TO FRIGHTEN
VISUAL DISTURBANCES: MILD SENSITIVITY
TOTAL SCORE: 20
HEADACHE, FULLNESS IN HEAD: VERY MILD
ORIENTATION AND CLOUDING OF SENSORIUM: ORIENTED AND CAN DO SERIAL ADDITIONS
ANXIETY: MODERATELY ANXIOUS OR GUARDED, SO ANXIETY IS INFERRED
AUDITORY DISTURBANCES: NOT PRESENT
AGITATION: *
AGITATION: MODERATELY FIDGETY AND RESTLESS
ORIENTATION AND CLOUDING OF SENSORIUM: ORIENTED AND CAN DO SERIAL ADDITIONS
TOTAL SCORE: VERY MILD ITCHING, PINS AND NEEDLES SENSATION, BURNING OR NUMBNESS
PAROXYSMAL SWEATS: BARELY PERCEPTIBLE SWEATING. PALMS MOIST
NAUSEA AND VOMITING: MILD NAUSEA WITH NO VOMITING
ORIENTATION AND CLOUDING OF SENSORIUM: ORIENTED AND CAN DO SERIAL ADDITIONS
TREMOR: MODERATE TREMOR WITH ARMS EXTENDED
NAUSEA AND VOMITING: NO NAUSEA AND NO VOMITING
TREMOR: TREMOR NOT VISIBLE BUT CAN BE FELT, FINGERTIP TO FINGERTIP
TREMOR: MODERATE TREMOR WITH ARMS EXTENDED
HEADACHE, FULLNESS IN HEAD: MODERATE
NAUSEA AND VOMITING: MILD NAUSEA WITH NO VOMITING
PAROXYSMAL SWEATS: BARELY PERCEPTIBLE SWEATING. PALMS MOIST
AGITATION: *
TOTAL SCORE: 16
AUDITORY DISTURBANCES: NOT PRESENT
HEADACHE, FULLNESS IN HEAD: MODERATE
AUDITORY DISTURBANCES: VERY MILD HARSHNESS OR ABILITY TO FRIGHTEN
AGITATION: MODERATELY FIDGETY AND RESTLESS
VISUAL DISTURBANCES: NOT PRESENT
ORIENTATION AND CLOUDING OF SENSORIUM: ORIENTED AND CAN DO SERIAL ADDITIONS
AGITATION: *
TOTAL SCORE: VERY MILD ITCHING, PINS AND NEEDLES SENSATION, BURNING OR NUMBNESS
AGITATION: SOMEWHAT MORE THAN NORMAL ACTIVITY
NAUSEA AND VOMITING: MILD NAUSEA WITH NO VOMITING
TOTAL SCORE: 13
VISUAL DISTURBANCES: NOT PRESENT
VISUAL DISTURBANCES: NOT PRESENT
TREMOR: *
TREMOR: TREMOR NOT VISIBLE BUT CAN BE FELT, FINGERTIP TO FINGERTIP
TOTAL SCORE: 20
AGITATION: MODERATELY FIDGETY AND RESTLESS
ANXIETY: MODERATELY ANXIOUS OR GUARDED, SO ANXIETY IS INFERRED
NAUSEA AND VOMITING: MILD NAUSEA WITH NO VOMITING
TOTAL SCORE: 17
VISUAL DISTURBANCES: MILD SENSITIVITY
TOTAL SCORE: VERY MILD ITCHING, PINS AND NEEDLES SENSATION, BURNING OR NUMBNESS
TOTAL SCORE: VERY MILD ITCHING, PINS AND NEEDLES SENSATION, BURNING OR NUMBNESS
ANXIETY: MODERATELY ANXIOUS OR GUARDED, SO ANXIETY IS INFERRED
VISUAL DISTURBANCES: NOT PRESENT
TREMOR: *
AGITATION: MODERATELY FIDGETY AND RESTLESS
ORIENTATION AND CLOUDING OF SENSORIUM: ORIENTED AND CAN DO SERIAL ADDITIONS
HEADACHE, FULLNESS IN HEAD: MODERATELY SEVERE
TOTAL SCORE: VERY MILD ITCHING, PINS AND NEEDLES SENSATION, BURNING OR NUMBNESS
ANXIETY: MODERATELY ANXIOUS OR GUARDED, SO ANXIETY IS INFERRED
TREMOR: MODERATE TREMOR WITH ARMS EXTENDED
PAROXYSMAL SWEATS: *
ORIENTATION AND CLOUDING OF SENSORIUM: ORIENTED AND CAN DO SERIAL ADDITIONS
NAUSEA AND VOMITING: MILD NAUSEA WITH NO VOMITING
ANXIETY: *
PAROXYSMAL SWEATS: BARELY PERCEPTIBLE SWEATING. PALMS MOIST
ANXIETY: MODERATELY ANXIOUS OR GUARDED, SO ANXIETY IS INFERRED
TOTAL SCORE: 17
TREMOR: MODERATE TREMOR WITH ARMS EXTENDED
AGITATION: MODERATELY FIDGETY AND RESTLESS
HEADACHE, FULLNESS IN HEAD: MODERATE
AUDITORY DISTURBANCES: NOT PRESENT
ORIENTATION AND CLOUDING OF SENSORIUM: ORIENTED AND CAN DO SERIAL ADDITIONS
TOTAL SCORE: VERY MILD ITCHING, PINS AND NEEDLES SENSATION, BURNING OR NUMBNESS
AGITATION: *
VISUAL DISTURBANCES: VERY MILD SENSITIVITY
TOTAL SCORE: 13
PAROXYSMAL SWEATS: BARELY PERCEPTIBLE SWEATING. PALMS MOIST
TREMOR: MODERATE TREMOR WITH ARMS EXTENDED
HEADACHE, FULLNESS IN HEAD: NOT PRESENT
AGITATION: MODERATELY FIDGETY AND RESTLESS
TOTAL SCORE: VERY MILD ITCHING, PINS AND NEEDLES SENSATION, BURNING OR NUMBNESS
ORIENTATION AND CLOUDING OF SENSORIUM: ORIENTED AND CAN DO SERIAL ADDITIONS
TOTAL SCORE: 20
ORIENTATION AND CLOUDING OF SENSORIUM: ORIENTED AND CAN DO SERIAL ADDITIONS
VISUAL DISTURBANCES: VERY MILD SENSITIVITY
HEADACHE, FULLNESS IN HEAD: MODERATELY SEVERE
TOTAL SCORE: MILD ITCHING, PINS AND NEEDLES SENSATION, BURNING OR NUMBNESS
NAUSEA AND VOMITING: NO NAUSEA AND NO VOMITING
HEADACHE, FULLNESS IN HEAD: MODERATE
ANXIETY: MODERATELY ANXIOUS OR GUARDED, SO ANXIETY IS INFERRED
HEADACHE, FULLNESS IN HEAD: MODERATE
TOTAL SCORE: 14
AUDITORY DISTURBANCES: NOT PRESENT
ANXIETY: *
AGITATION: *
NAUSEA AND VOMITING: NO NAUSEA AND NO VOMITING
TOTAL SCORE: VERY MILD ITCHING, PINS AND NEEDLES SENSATION, BURNING OR NUMBNESS
PAROXYSMAL SWEATS: *
HEADACHE, FULLNESS IN HEAD: MODERATELY SEVERE
ANXIETY: MODERATELY ANXIOUS OR GUARDED, SO ANXIETY IS INFERRED
ANXIETY: MODERATELY ANXIOUS OR GUARDED, SO ANXIETY IS INFERRED
ORIENTATION AND CLOUDING OF SENSORIUM: ORIENTED AND CAN DO SERIAL ADDITIONS
SUBSTANCE_ABUSE: 1
VISUAL DISTURBANCES: NOT PRESENT
NAUSEA AND VOMITING: NO NAUSEA AND NO VOMITING
PAROXYSMAL SWEATS: BARELY PERCEPTIBLE SWEATING. PALMS MOIST
ORIENTATION AND CLOUDING OF SENSORIUM: ORIENTED AND CAN DO SERIAL ADDITIONS
VISUAL DISTURBANCES: VERY MILD SENSITIVITY
TREMOR: TREMOR NOT VISIBLE BUT CAN BE FELT, FINGERTIP TO FINGERTIP
PAROXYSMAL SWEATS: BARELY PERCEPTIBLE SWEATING. PALMS MOIST
NAUSEA AND VOMITING: MILD NAUSEA WITH NO VOMITING
AUDITORY DISTURBANCES: NOT PRESENT
ANXIETY: MODERATELY ANXIOUS OR GUARDED, SO ANXIETY IS INFERRED
PAROXYSMAL SWEATS: *
ORIENTATION AND CLOUDING OF SENSORIUM: ORIENTED AND CAN DO SERIAL ADDITIONS
VISUAL DISTURBANCES: NOT PRESENT
TREMOR: MODERATE TREMOR WITH ARMS EXTENDED
NAUSEA AND VOMITING: MILD NAUSEA WITH NO VOMITING
TOTAL SCORE: VERY MILD ITCHING, PINS AND NEEDLES SENSATION, BURNING OR NUMBNESS
ORIENTATION AND CLOUDING OF SENSORIUM: ORIENTED AND CAN DO SERIAL ADDITIONS
HEADACHE, FULLNESS IN HEAD: MODERATE
PAROXYSMAL SWEATS: BARELY PERCEPTIBLE SWEATING. PALMS MOIST
TREMOR: *
AUDITORY DISTURBANCES: NOT PRESENT
VISUAL DISTURBANCES: NOT PRESENT
AUDITORY DISTURBANCES: NOT PRESENT
VISUAL DISTURBANCES: VERY MILD SENSITIVITY
HEADACHE, FULLNESS IN HEAD: VERY MILD
TOTAL SCORE: VERY MILD ITCHING, PINS AND NEEDLES SENSATION, BURNING OR NUMBNESS
ORIENTATION AND CLOUDING OF SENSORIUM: ORIENTED AND CAN DO SERIAL ADDITIONS
ANXIETY: *
PAROXYSMAL SWEATS: BARELY PERCEPTIBLE SWEATING. PALMS MOIST
AUDITORY DISTURBANCES: NOT PRESENT
HEADACHE, FULLNESS IN HEAD: MODERATELY SEVERE
TOTAL SCORE: 21
TOTAL SCORE: VERY MILD ITCHING, PINS AND NEEDLES SENSATION, BURNING OR NUMBNESS
TREMOR: *
ORIENTATION AND CLOUDING OF SENSORIUM: ORIENTED AND CAN DO SERIAL ADDITIONS
ANXIETY: MODERATELY ANXIOUS OR GUARDED, SO ANXIETY IS INFERRED
TREMOR: MODERATE TREMOR WITH ARMS EXTENDED
ANXIETY: MODERATELY ANXIOUS OR GUARDED, SO ANXIETY IS INFERRED
TOTAL SCORE: 14
PAROXYSMAL SWEATS: BARELY PERCEPTIBLE SWEATING. PALMS MOIST
ANXIETY: MODERATELY ANXIOUS OR GUARDED, SO ANXIETY IS INFERRED
PAROXYSMAL SWEATS: BARELY PERCEPTIBLE SWEATING. PALMS MOIST
VISUAL DISTURBANCES: VERY MILD SENSITIVITY
AUDITORY DISTURBANCES: VERY MILD HARSHNESS OR ABILITY TO FRIGHTEN
AGITATION: MODERATELY FIDGETY AND RESTLESS
TREMOR: *
AUDITORY DISTURBANCES: NOT PRESENT
TOTAL SCORE: 21
TREMOR: MODERATE TREMOR WITH ARMS EXTENDED
TOTAL SCORE: 17
ANXIETY: MODERATELY ANXIOUS OR GUARDED, SO ANXIETY IS INFERRED
PAROXYSMAL SWEATS: BARELY PERCEPTIBLE SWEATING. PALMS MOIST
ANXIETY: MODERATELY ANXIOUS OR GUARDED, SO ANXIETY IS INFERRED
TOTAL SCORE: 17
AUDITORY DISTURBANCES: NOT PRESENT
NAUSEA AND VOMITING: *
TOTAL SCORE: 17
AGITATION: MODERATELY FIDGETY AND RESTLESS
PAROXYSMAL SWEATS: *
TOTAL SCORE: VERY MILD ITCHING, PINS AND NEEDLES SENSATION, BURNING OR NUMBNESS
AGITATION: SOMEWHAT MORE THAN NORMAL ACTIVITY
ORIENTATION AND CLOUDING OF SENSORIUM: ORIENTED AND CAN DO SERIAL ADDITIONS
HEADACHE, FULLNESS IN HEAD: MODERATELY SEVERE
AUDITORY DISTURBANCES: NOT PRESENT
TREMOR: *
NAUSEA AND VOMITING: NO NAUSEA AND NO VOMITING
VISUAL DISTURBANCES: NOT PRESENT
TOTAL SCORE: VERY MILD ITCHING, PINS AND NEEDLES SENSATION, BURNING OR NUMBNESS
HEADACHE, FULLNESS IN HEAD: NOT PRESENT

## 2024-08-20 ASSESSMENT — ENCOUNTER SYMPTOMS
NAUSEA: 1
TREMORS: 1
MYALGIAS: 1
CHILLS: 1
PALPITATIONS: 0
DIARRHEA: 0
SHORTNESS OF BREATH: 1
DIAPHORESIS: 1
TINGLING: 1
ORTHOPNEA: 0
LOSS OF CONSCIOUSNESS: 0
ABDOMINAL PAIN: 1
CONSTIPATION: 0
MUSCULOSKELETAL NEGATIVE: 1
SORE THROAT: 1
SEIZURES: 0
RESPIRATORY NEGATIVE: 1
HEADACHES: 1
INSOMNIA: 1
EYES NEGATIVE: 1
CARDIOVASCULAR NEGATIVE: 1
VOMITING: 0
COUGH: 1
HEARTBURN: 1

## 2024-08-20 ASSESSMENT — FIBROSIS 4 INDEX: FIB4 SCORE: 0.46

## 2024-08-20 NOTE — CARE PLAN
The patient is Watcher - Medium risk of patient condition declining or worsening    Shift Goals  Clinical Goals: CIWA, vitals, Pain  Patient Goals: pain control  Family Goals: SOLA    Progress made toward(s) clinical / shift goals:    Problem: Pain - Standard  Goal: Alleviation of pain or a reduction in pain to the patient’s comfort goal  Outcome: Progressing     Problem: Knowledge Deficit - Standard  Goal: Patient and family/care givers will demonstrate understanding of plan of care, disease process/condition, diagnostic tests and medications  Outcome: Progressing     Problem: Optimal Care for Alcohol Withdrawal  Goal: Optimal Care for the alcohol withdrawal patient  Outcome: Progressing  Note: CIWA scoring completed per score and medications admininistrated per score.      Problem: Seizure Precautions  Goal: Implementation of seizure precautions  Outcome: Progressing  Note: Seizure precautions in place with bed in lowest position, padded side rails, bed alarm on,      Problem: Fall Risk  Goal: Patient will remain free from falls  Outcome: Progressing  Note: Fall risk factors assess and bed alarm in place, with bed locked in lowest position. Call light in reach

## 2024-08-20 NOTE — PROGRESS NOTES
4 Eyes Skin Assessment Completed by RADHA Landrum and RADHA Gilbert.    Head WDL  Ears WDL  Nose WDL  Mouth WDL  Neck WDL  Breast/Chest WDL  Shoulder Blades WDL  Spine WDL  (R) Arm/Elbow/Hand WDL  (L) Arm/Elbow/Hand WDL  Abdomen WDL  Groin WDL  Scrotum/Coccyx/Buttocks WDL  (R) Leg WDL  (L) Leg WDL  (R) Heel/Foot/Toe WDL  (L) Heel/Foot/Toe WDL          Devices In Places Tele Box      Interventions In Place Pillows    Possible Skin Injury No    Pictures Uploaded Into Epic N/A  Wound Consult Placed N/A  RN Wound Prevention Protocol Ordered No     Patients overall skin intact, tattoos throughout

## 2024-08-20 NOTE — CARE PLAN
The patient is Stable - Low risk of patient condition declining or worsening    Shift Goals  Clinical Goals: CIWA, monitor vitals  Patient Goals: Feel better, decrease pain  Family Goals: No family present      Problem: Pain - Standard  Goal: Alleviation of pain or a reduction in pain to the patient’s comfort goal  Description: Target End Date:  Prior to discharge or change in level of care    Document on Vitals flowsheet    1.  Document pain using the appropriate pain scale per order or unit policy  2.  Educate and implement non-pharmacologic comfort measures (i.e. relaxation, distraction, massage, cold/heat therapy, etc.)  3.  Pain management medications as ordered  4.  Reassess pain after pain med administration per policy  5.  If opiods administered assess patient's response to pain medication is appropriate per POSS sedation scale  6.  Follow pain management plan developed in collaboration with patient and interdisciplinary team (including palliative care or pain specialists if applicable)  Outcome: Progressing  Note: Pain managed with PRN medication per MAR.      Problem: Optimal Care for Alcohol Withdrawal  Goal: Optimal Care for the alcohol withdrawal patient  Description: Target End Date:  1 to 3 days    1.  Alcohol history screening done on admission  2.  CIWA-AR score assessment (includes assessment of nausea/vomiting, tremor, sweats, anxiety, agitation, tactile, visual and auditory disturbances, headache and orientation/sensorium).  Document on CIWA flowsheet.  3.  Follow CIWA-AR score protocol  4.  Frequent reorientation  5.  Monitor for fluid and electrolyte imbalance.  6.  Assess for respiratory depression due to sedation (pulse ox)  7.  Consider thiamine, multivitamins, folic acid and magnesium sulfate per provider order  8.  Collaborate with Social Workers/Case Management  9.  Collaborate with mental health  Outcome: Progressing  Note: CIWA protocol in place       Progress made toward(s) clinical /  shift goals:      Patient is not progressing towards the following goals:

## 2024-08-20 NOTE — NON-PROVIDER
" Daily Progress Note    Date of Service  8/20/2024    Chief Complaint  Praful Carmen is a 24 y.o. male with hx of alcohol withdrawal with seizures esophageal perforation admitted 8/19/2024 with  intractable nausea and vomiting, alcohol withdrawal.    Hospital Course  Pt with hx of alcohol abuse, alcohol withdrawal with seizures, and esophageal perforation presented to the ER at 3am on 08/19 complaining of intractable nausea and vomiting, alcohol withdrawal, and throat pain. Pt was previously sober from alcohol for 3 months, noted that he relapsed on Thursday as he was struggling with the anniversary of his sister's death. Pt notes he drank multiple shots of hard liquor \"nonstop\" from Thursday 8/15 night to Sunday 8/18 night, with his last drink being around 10pm Sunday. He began vomiting at 11pm, and presented to the ER at 3 am as his previous provider had advised him to go to the hospital if he was worried he had ruptured his esophagus again. He also noted he felt anxious, tremulous, and sweaty..    Pt evaluated in the ER, was noted to have elevated BP of 149/89, tachycardia with HR of 109, tachypnea with RR of 20, and spO2 of 93% on room air. His physical exam was significant for diaphoresis, pale appearance, dry oral mucosa, TTP in epigastrum and periumbilical region with voluntary guarding, carpal spam of the right upper extremity, and anxious and tearful mood. His labs in the ER demonstrated an alcohol level of 14.9, elevated WBC of 18.6, potassium of 3.4, chloride of 94, CO2 of 16, AG of 30, glucose of 153, and lactic acid of 8.4. VBG demonstrated pH of 7.47, Pco2 of 31.5, Po2 of 69, and HCO3 of 23. Chest x-ray demonstrated no acute process, no sign of pneumomediastinum. CT abdomen demonstrated hepatic steatosis but no acute process.    In the ER, he was given lorazepam 2mg IV, morphine 4mg IV, Zofran 4mg IV, famotidine 20mg IV, and 3L of LR. He was later admitted to the telemetry floor for observation " "and monitoring.     Interval Problem Update  Pt complains of inability to sleep, anxiety \"through the roof,\" is worried about his mother losing a second child to alcoholism after the death of his sister.  Notes he continues to experience abdominal pain rated 8/10, mid-neck burning throat pain, shortness of breath, tremor, tingling of his extremities, muscle pain, and headache described as a feeling of fullness all around his head.  Pt reports he does not have an appetite. He does not want to drink or eat anything at this time.    This patient's plan of care and discharge plan was discussed at IDT rounds today with Case Management, Nursing, Nursing leadership, and other members of the IDT team.    Code Status  Full Code    Review of Systems  Review of Systems   Constitutional:  Positive for diaphoresis.   HENT:  Positive for sore throat.    Respiratory:  Positive for cough and shortness of breath.    Cardiovascular:  Negative for chest pain, palpitations and orthopnea.   Gastrointestinal:  Positive for abdominal pain and nausea. Negative for constipation, diarrhea and vomiting.   Musculoskeletal:  Positive for myalgias.   Neurological:  Positive for tingling, tremors and headaches. Negative for seizures and loss of consciousness.        Physical Exam  Temp:  [36.1 °C (97 °F)-36.5 °C (97.7 °F)] 36.5 °C (97.7 °F)  Pulse:  [] 73  Resp:  [15-22] 17  BP: (111-145)/() 113/81  SpO2:  [93 %-99 %] 96 %    Physical Exam  Constitutional:       Appearance: He is ill-appearing. He is not diaphoretic.   HENT:      Mouth/Throat:      Mouth: Mucous membranes are moist.      Pharynx: Oropharynx is clear. No oropharyngeal exudate or posterior oropharyngeal erythema.   Cardiovascular:      Rate and Rhythm: Normal rate and regular rhythm.      Pulses: Normal pulses.      Heart sounds: Normal heart sounds. No murmur heard.     No friction rub. No gallop.   Pulmonary:      Effort: Pulmonary effort is normal.      Breath " sounds: No stridor. No rhonchi.   Chest:      Chest wall: No tenderness.   Abdominal:      General: Abdomen is flat. Bowel sounds are normal. There is no distension.      Palpations: Abdomen is soft. There is no mass.      Tenderness: There is abdominal tenderness.      Hernia: No hernia is present.      Comments: Voluntary guarding. Diffuse abdominal tenderness   Musculoskeletal:         General: Tenderness present. No swelling.      Right upper arm: Tenderness present. No swelling or edema.      Left upper arm: Tenderness present. No swelling or edema.      Right forearm: Tenderness present. No swelling or edema.      Left forearm: Tenderness present. No swelling or edema.      Right lower leg: Tenderness present. No swelling. No edema.      Left lower leg: Tenderness present. No swelling. No edema.   Skin:     General: Skin is warm and dry.   Neurological:      General: No focal deficit present.      Mental Status: He is alert.   Psychiatric:         Mood and Affect: Mood is anxious. Affect is tearful.       Fluids    Intake/Output Summary (Last 24 hours) at 8/20/2024 0801  Last data filed at 8/20/2024 0308  Gross per 24 hour   Intake 480 ml   Output 750 ml   Net -270 ml       Laboratory  Recent Labs     08/19/24  0302 08/20/24  0314   WBC 18.6* 6.7   RBC 5.78 4.81   HEMOGLOBIN 17.3 14.9   HEMATOCRIT 47.6 43.0   MCV 82.4 89.4   MCH 29.9 31.0   MCHC 36.3 34.7   RDW 37.4 41.1   PLATELETCT 351 197   MPV 9.8 10.2     Recent Labs     08/19/24  0302 08/20/24  0314   SODIUM 140 137   POTASSIUM 3.4* 4.1   CHLORIDE 94* 105   CO2 16* 20   GLUCOSE 153* 80   BUN 11 7*   CREATININE 1.07 0.58   CALCIUM 9.9 8.9                   Imaging   Most Recent   DX-CHEST-PORTABLE (1 VIEW) Rpt  8/19/24 03:26   Findings: Cardiac silhouette is normal in size. No airspace infiltrate, pleural effusion, or pneumothorax.        Most Recent   CT-ABDOMEN-PELVIS WITH Rpt  8/19/24 04:00   IMPRESSION:   1. No acute process.  2. Hepatic  steatosis.    Assessment/Plan  #Alcohol Withdrawal  Pt with hx of alcohol withdrawal and seizures presented to the ER Monday morning with intractable nausea and vomiting, tremor, diaphoresis, anxiety. He had his last drink on Sunday at 10pm after 3 days of binge drinking. Pt continues to report tremulousness, HA, anxiety, last CIWA score this afternoon was 21. CIWA scores continue to be elevated despite frequent Ativan dosing.  -Begin chlordiazepoxide for alcohol withdrawal control and prevention of seizures.  -Continue daily multivitamin, folic acid, b12 supplementation.  -Continue Q1hr CIWA checks as pt is high risk for delirium tremens and seizure.    #Rhabdomyolysis  Pt presented to ER last night for alcohol withdrawal, tremor, intractable nausea and vomiting. UDS done in the ER was positive for barbiturates, benzodiazepines, cannabinoid metabolites, opiates, oxycodone, and cocaine. CPK obtained in the ER demonstrated CPK of 310. UA in ER demonstrated trace RBC 0-5, heme was not tested. Today, pt complains of muscle aches, soreness and swelling, as well as abdominal pain. BUE and BLE feel soft on palpation, no crepitus, edema, or rigidity present. Pt reports muscles are sore on palpation. There is diffuse abdominal tenderness, bowel sounds are active, no distention noted. Pt has clinical presentation consistent with rhabdomyolysis with elevated CPK, muscle soreness, abdominal pain.  -CPK is elevated, trend CPK daily.  -Monitor for development of DAMARIS, compartment syndrome.  -Continue saline Kcl 40mEq infusion at 100mL/hr.    #Nausea and Vomiting  Pt presented to the ER on 8/19 for 4 hours of intractable nausea and vomiting. 40mg IV protonix, 4mg Zofran was started in ER.  -Continue IV protonix, zofran administration.  -Begin GI cocktail for abdominal pain and burning.    #Sore throat  Pt reports he has a sore throat in the middle of his neck, he is concerned he is having another esophageal rupture. Chest XR in  the ER was negative for cardiopulmonary abnormalities, CT was negative for pneumomediastinum. Due to his intractable vomiting, it is likely he could have aspirated stomach acid and caused irritation to his trachea, or is experiencing esophagitis after episode heavy vomiting. PE negative for crepitus in the neck.  -Pt will be given GI cocktail to alleviate burning.      I have performed a physical exam and reviewed and updated ROS and Plan today (8/20/2024). In review of yesterday's note (8/19/2024), there are no changes except as documented above.

## 2024-08-20 NOTE — PROGRESS NOTES
Notified by monitors that patient has new ST elevation. Notified Nima Laureano, that patient now has new ST elevation, with recent vitals and abdominal/throat pain. Received orders for EKG

## 2024-08-20 NOTE — CARE PLAN
The patient is Stable - Low risk of patient condition declining or worsening    Shift Goals  Clinical Goals: ANASTASIA vitals  Patient Goals: rest  Family Goals: avelina    Progress made toward(s) clinical / shift goals:    Problem: Pain - Standard  Goal: Alleviation of pain or a reduction in pain to the patient’s comfort goal  Description: Target End Date:  Prior to discharge or change in level of care    Document on Vitals flowsheet    1.  Document pain using the appropriate pain scale per order or unit policy  2.  Educate and implement non-pharmacologic comfort measures (i.e. relaxation, distraction, massage, cold/heat therapy, etc.)  3.  Pain management medications as ordered  4.  Reassess pain after pain med administration per policy  5.  If opiods administered assess patient's response to pain medication is appropriate per POSS sedation scale  6.  Follow pain management plan developed in collaboration with patient and interdisciplinary team (including palliative care or pain specialists if applicable)  Outcome: Progressing  Note: Pain managed with PRN pain medications.      Problem: Knowledge Deficit - Standard  Goal: Patient and family/care givers will demonstrate understanding of plan of care, disease process/condition, diagnostic tests and medications  Description: Target End Date:  1-3 days or as soon as patient condition allows    Document in Patient Education    1.  Patient and family/caregiver oriented to unit, equipment, visitation policy and means for communicating concern  2.  Complete/review Learning Assessment  3.  Assess knowledge level of disease process/condition, treatment plan, diagnostic tests and medications  4.  Explain disease process/condition, treatment plan, diagnostic tests and medications  Outcome: Progressing  Note: Pt actively participates in POC. Pt and board updated, all questions and concerns answered. Pt encouraged to voice all questions and concerns.     Problem: Optimal Care for  Alcohol Withdrawal  Goal: Optimal Care for the alcohol withdrawal patient  Description: Target End Date:  1 to 3 days    1.  Alcohol history screening done on admission  2.  CIWA-AR score assessment (includes assessment of nausea/vomiting, tremor, sweats, anxiety, agitation, tactile, visual and auditory disturbances, headache and orientation/sensorium).  Document on CIWA flowsheet.  3.  Follow CIWA-AR score protocol  4.  Frequent reorientation  5.  Monitor for fluid and electrolyte imbalance.  6.  Assess for respiratory depression due to sedation (pulse ox)  7.  Consider thiamine, multivitamins, folic acid and magnesium sulfate per provider order  8.  Collaborate with Social Workers/Case Management  9.  Collaborate with mental health  Outcome: Progressing  Note: CIWA scoring continued throughout shift.      Problem: Seizure Precautions  Goal: Implementation of seizure precautions  Description: Target End Date:  Prior to discharge or change in level of care    1.  Padded side rails up at all times  2.  Suction equipment and oxygen delivery system at bedside  3.  Continuous pulse oximeter in use  4.  Implement fall precautions, bed alarm on, bed in lowest position  5.  IV access (per order)  6.  Provide low stimulus environment, avoid exposure to triggers  7.  Instruct patient to use call light/seizure button if having warning signs of impending seizure  Outcome: Progressing  Note: Seizure precautions in place.

## 2024-08-20 NOTE — PROGRESS NOTES
Pt arrived to unit with ER tech, was able to ambulate into bed with standby assistance. He is alert and oriented x4, on room air. Admitting vitals taken. Pt has been educated in regard to plan of care, all questions answered accordingly, verbalizes understanding. Bed is locked and in the lowest position, call light within reach, bed alarm for safety on.

## 2024-08-21 LAB
ALBUMIN SERPL BCP-MCNC: 4.2 G/DL (ref 3.2–4.9)
ALBUMIN/GLOB SERPL: 1.6 G/DL
ALP SERPL-CCNC: 108 U/L (ref 30–99)
ALT SERPL-CCNC: 29 U/L (ref 2–50)
ANION GAP SERPL CALC-SCNC: 14 MMOL/L (ref 7–16)
AST SERPL-CCNC: 30 U/L (ref 12–45)
BASOPHILS # BLD AUTO: 0.5 % (ref 0–1.8)
BASOPHILS # BLD: 0.04 K/UL (ref 0–0.12)
BILIRUB SERPL-MCNC: 1.2 MG/DL (ref 0.1–1.5)
BUN SERPL-MCNC: 8 MG/DL (ref 8–22)
CALCIUM ALBUM COR SERPL-MCNC: 9.3 MG/DL (ref 8.5–10.5)
CALCIUM SERPL-MCNC: 9.5 MG/DL (ref 8.5–10.5)
CHLORIDE SERPL-SCNC: 102 MMOL/L (ref 96–112)
CO2 SERPL-SCNC: 21 MMOL/L (ref 20–33)
CREAT SERPL-MCNC: 0.66 MG/DL (ref 0.5–1.4)
EOSINOPHIL # BLD AUTO: 0.2 K/UL (ref 0–0.51)
EOSINOPHIL NFR BLD: 2.4 % (ref 0–6.9)
ERYTHROCYTE [DISTWIDTH] IN BLOOD BY AUTOMATED COUNT: 38.8 FL (ref 35.9–50)
GFR SERPLBLD CREATININE-BSD FMLA CKD-EPI: 133 ML/MIN/1.73 M 2
GLOBULIN SER CALC-MCNC: 2.7 G/DL (ref 1.9–3.5)
GLUCOSE SERPL-MCNC: 85 MG/DL (ref 65–99)
HCT VFR BLD AUTO: 46.8 % (ref 42–52)
HGB BLD-MCNC: 16.2 G/DL (ref 14–18)
IMM GRANULOCYTES # BLD AUTO: 0.03 K/UL (ref 0–0.11)
IMM GRANULOCYTES NFR BLD AUTO: 0.4 % (ref 0–0.9)
LYMPHOCYTES # BLD AUTO: 2.15 K/UL (ref 1–4.8)
LYMPHOCYTES NFR BLD: 25.7 % (ref 22–41)
MCH RBC QN AUTO: 30.4 PG (ref 27–33)
MCHC RBC AUTO-ENTMCNC: 34.6 G/DL (ref 32.3–36.5)
MCV RBC AUTO: 87.8 FL (ref 81.4–97.8)
MONOCYTES # BLD AUTO: 0.52 K/UL (ref 0–0.85)
MONOCYTES NFR BLD AUTO: 6.2 % (ref 0–13.4)
NEUTROPHILS # BLD AUTO: 5.43 K/UL (ref 1.82–7.42)
NEUTROPHILS NFR BLD: 64.8 % (ref 44–72)
NRBC # BLD AUTO: 0 K/UL
NRBC BLD-RTO: 0 /100 WBC (ref 0–0.2)
PLATELET # BLD AUTO: 212 K/UL (ref 164–446)
PMV BLD AUTO: 9.9 FL (ref 9–12.9)
POTASSIUM SERPL-SCNC: 3.6 MMOL/L (ref 3.6–5.5)
PROT SERPL-MCNC: 6.9 G/DL (ref 6–8.2)
RBC # BLD AUTO: 5.33 M/UL (ref 4.7–6.1)
SODIUM SERPL-SCNC: 137 MMOL/L (ref 135–145)
WBC # BLD AUTO: 8.4 K/UL (ref 4.8–10.8)

## 2024-08-21 PROCEDURE — 700101 HCHG RX REV CODE 250

## 2024-08-21 PROCEDURE — 700102 HCHG RX REV CODE 250 W/ 637 OVERRIDE(OP): Performed by: INTERNAL MEDICINE

## 2024-08-21 PROCEDURE — 80053 COMPREHEN METABOLIC PANEL: CPT

## 2024-08-21 PROCEDURE — 770020 HCHG ROOM/CARE - TELE (206)

## 2024-08-21 PROCEDURE — A9270 NON-COVERED ITEM OR SERVICE: HCPCS | Performed by: INTERNAL MEDICINE

## 2024-08-21 PROCEDURE — 85025 COMPLETE CBC W/AUTO DIFF WBC: CPT

## 2024-08-21 PROCEDURE — A9270 NON-COVERED ITEM OR SERVICE: HCPCS

## 2024-08-21 PROCEDURE — 700111 HCHG RX REV CODE 636 W/ 250 OVERRIDE (IP)

## 2024-08-21 PROCEDURE — 36415 COLL VENOUS BLD VENIPUNCTURE: CPT

## 2024-08-21 PROCEDURE — 99232 SBSQ HOSP IP/OBS MODERATE 35: CPT | Mod: GC | Performed by: INTERNAL MEDICINE

## 2024-08-21 PROCEDURE — 700111 HCHG RX REV CODE 636 W/ 250 OVERRIDE (IP): Performed by: INTERNAL MEDICINE

## 2024-08-21 PROCEDURE — 700102 HCHG RX REV CODE 250 W/ 637 OVERRIDE(OP)

## 2024-08-21 RX ORDER — CHLORDIAZEPOXIDE HYDROCHLORIDE 25 MG/1
25 CAPSULE, GELATIN COATED ORAL EVERY 6 HOURS
Status: DISCONTINUED | OUTPATIENT
Start: 2024-08-21 | End: 2024-08-22

## 2024-08-21 RX ORDER — HEPARIN SODIUM 5000 [USP'U]/ML
5000 INJECTION, SOLUTION INTRAVENOUS; SUBCUTANEOUS EVERY 8 HOURS
Status: DISCONTINUED | OUTPATIENT
Start: 2024-08-21 | End: 2024-08-22

## 2024-08-21 RX ORDER — ONDANSETRON 4 MG/1
4 TABLET, ORALLY DISINTEGRATING ORAL EVERY 6 HOURS
Status: DISCONTINUED | OUTPATIENT
Start: 2024-08-21 | End: 2024-08-23 | Stop reason: HOSPADM

## 2024-08-21 RX ORDER — ONDANSETRON 4 MG/1
4 TABLET, ORALLY DISINTEGRATING ORAL EVERY 4 HOURS PRN
Status: CANCELLED | OUTPATIENT
Start: 2024-08-21

## 2024-08-21 RX ORDER — LIDOCAINE 4 G/G
1 PATCH TOPICAL EVERY 24 HOURS
Status: DISCONTINUED | OUTPATIENT
Start: 2024-08-21 | End: 2024-08-23 | Stop reason: HOSPADM

## 2024-08-21 RX ADMIN — CHLORDIAZEPOXIDE HYDROCHLORIDE 25 MG: 25 CAPSULE ORAL at 16:10

## 2024-08-21 RX ADMIN — SERTRALINE HYDROCHLORIDE 25 MG: 50 TABLET ORAL at 05:44

## 2024-08-21 RX ADMIN — CHLORDIAZEPOXIDE HYDROCHLORIDE 25 MG: 25 CAPSULE ORAL at 12:41

## 2024-08-21 RX ADMIN — LORAZEPAM 1 MG: 1 TABLET ORAL at 22:09

## 2024-08-21 RX ADMIN — FOLIC ACID 1 MG: 1 TABLET ORAL at 05:43

## 2024-08-21 RX ADMIN — LIDOCAINE HYDROCHLORIDE 15 ML: 20 SOLUTION OROPHARYNGEAL at 16:10

## 2024-08-21 RX ADMIN — GABAPENTIN 100 MG: 250 SOLUTION ORAL at 21:29

## 2024-08-21 RX ADMIN — OXYCODONE HYDROCHLORIDE 10 MG: 10 TABLET ORAL at 08:41

## 2024-08-21 RX ADMIN — ONDANSETRON 4 MG: 4 TABLET, ORALLY DISINTEGRATING ORAL at 08:22

## 2024-08-21 RX ADMIN — LIDOCAINE HYDROCHLORIDE 15 ML: 20 SOLUTION OROPHARYNGEAL at 22:00

## 2024-08-21 RX ADMIN — SENNOSIDES AND DOCUSATE SODIUM 2 TABLET: 50; 8.6 TABLET ORAL at 16:11

## 2024-08-21 RX ADMIN — THERA TABS 1 TABLET: TAB at 05:44

## 2024-08-21 RX ADMIN — ONDANSETRON 4 MG: 4 TABLET, ORALLY DISINTEGRATING ORAL at 19:35

## 2024-08-21 RX ADMIN — LORAZEPAM 2 MG: 2 TABLET ORAL at 13:49

## 2024-08-21 RX ADMIN — OXYCODONE HYDROCHLORIDE 10 MG: 10 TABLET ORAL at 04:04

## 2024-08-21 RX ADMIN — OXYCODONE HYDROCHLORIDE 5 MG: 5 TABLET ORAL at 16:11

## 2024-08-21 RX ADMIN — LORAZEPAM 1 MG: 1 TABLET ORAL at 17:52

## 2024-08-21 RX ADMIN — OXYCODONE HYDROCHLORIDE 10 MG: 10 TABLET ORAL at 19:35

## 2024-08-21 RX ADMIN — HEPARIN SODIUM 5000 UNITS: 5000 INJECTION, SOLUTION INTRAVENOUS; SUBCUTANEOUS at 22:09

## 2024-08-21 RX ADMIN — PANTOPRAZOLE SODIUM 40 MG: 40 INJECTION, POWDER, FOR SOLUTION INTRAVENOUS at 16:10

## 2024-08-21 RX ADMIN — LORAZEPAM 1 MG: 1 TABLET ORAL at 09:38

## 2024-08-21 RX ADMIN — PANTOPRAZOLE SODIUM 40 MG: 40 INJECTION, POWDER, FOR SOLUTION INTRAVENOUS at 06:27

## 2024-08-21 RX ADMIN — LIDOCAINE 1 PATCH: 4 PATCH TOPICAL at 14:37

## 2024-08-21 RX ADMIN — LORAZEPAM 2 MG: 2 TABLET ORAL at 15:51

## 2024-08-21 RX ADMIN — Medication 100 MG: at 05:44

## 2024-08-21 RX ADMIN — CHLORDIAZEPOXIDE HYDROCHLORIDE 50 MG: 25 CAPSULE ORAL at 05:43

## 2024-08-21 RX ADMIN — HEPARIN SODIUM 5000 UNITS: 5000 INJECTION, SOLUTION INTRAVENOUS; SUBCUTANEOUS at 13:49

## 2024-08-21 ASSESSMENT — LIFESTYLE VARIABLES
ORIENTATION AND CLOUDING OF SENSORIUM: ORIENTED AND CAN DO SERIAL ADDITIONS
SUBSTANCE_ABUSE: 1
TREMOR: TREMOR NOT VISIBLE BUT CAN BE FELT, FINGERTIP TO FINGERTIP
TREMOR: *
HEADACHE, FULLNESS IN HEAD: MILD
TREMOR: TREMOR NOT VISIBLE BUT CAN BE FELT, FINGERTIP TO FINGERTIP
ORIENTATION AND CLOUDING OF SENSORIUM: ORIENTED AND CAN DO SERIAL ADDITIONS
HEADACHE, FULLNESS IN HEAD: MODERATE
HEADACHE, FULLNESS IN HEAD: MODERATELY SEVERE
AGITATION: NORMAL ACTIVITY
PAROXYSMAL SWEATS: NO SWEAT VISIBLE
AUDITORY DISTURBANCES: NOT PRESENT
NAUSEA AND VOMITING: NO NAUSEA AND NO VOMITING
ANXIETY: MODERATELY ANXIOUS OR GUARDED, SO ANXIETY IS INFERRED
PAROXYSMAL SWEATS: NO SWEAT VISIBLE
HEADACHE, FULLNESS IN HEAD: MODERATELY SEVERE
PAROXYSMAL SWEATS: BARELY PERCEPTIBLE SWEATING. PALMS MOIST
HEADACHE, FULLNESS IN HEAD: MODERATELY SEVERE
TOTAL SCORE: 1
VISUAL DISTURBANCES: NOT PRESENT
VISUAL DISTURBANCES: NOT PRESENT
AGITATION: SOMEWHAT MORE THAN NORMAL ACTIVITY
TOTAL SCORE: VERY MILD ITCHING, PINS AND NEEDLES SENSATION, BURNING OR NUMBNESS
AGITATION: SOMEWHAT MORE THAN NORMAL ACTIVITY
PAROXYSMAL SWEATS: NO SWEAT VISIBLE
AGITATION: NORMAL ACTIVITY
NAUSEA AND VOMITING: NO NAUSEA AND NO VOMITING
ANXIETY: NO ANXIETY (AT EASE)
ORIENTATION AND CLOUDING OF SENSORIUM: ORIENTED AND CAN DO SERIAL ADDITIONS
TREMOR: NO TREMOR
VISUAL DISTURBANCES: VERY MILD SENSITIVITY
PAROXYSMAL SWEATS: NO SWEAT VISIBLE
AUDITORY DISTURBANCES: NOT PRESENT
TOTAL SCORE: 10
VISUAL DISTURBANCES: VERY MILD SENSITIVITY
NAUSEA AND VOMITING: NO NAUSEA AND NO VOMITING
NAUSEA AND VOMITING: NO NAUSEA AND NO VOMITING
TOTAL SCORE: VERY MILD ITCHING, PINS AND NEEDLES SENSATION, BURNING OR NUMBNESS
TOTAL SCORE: 10
ANXIETY: MODERATELY ANXIOUS OR GUARDED, SO ANXIETY IS INFERRED
VISUAL DISTURBANCES: NOT PRESENT
TREMOR: TREMOR NOT VISIBLE BUT CAN BE FELT, FINGERTIP TO FINGERTIP
ANXIETY: MILDLY ANXIOUS
ANXIETY: MODERATELY ANXIOUS OR GUARDED, SO ANXIETY IS INFERRED
AUDITORY DISTURBANCES: NOT PRESENT
TOTAL SCORE: 2
ANXIETY: *
ORIENTATION AND CLOUDING OF SENSORIUM: ORIENTED AND CAN DO SERIAL ADDITIONS
NAUSEA AND VOMITING: MILD NAUSEA WITH NO VOMITING
TOTAL SCORE: VERY MILD ITCHING, PINS AND NEEDLES SENSATION, BURNING OR NUMBNESS
ORIENTATION AND CLOUDING OF SENSORIUM: ORIENTED AND CAN DO SERIAL ADDITIONS
AGITATION: NORMAL ACTIVITY
TOTAL SCORE: VERY MILD ITCHING, PINS AND NEEDLES SENSATION, BURNING OR NUMBNESS
TOTAL SCORE: 12
PAROXYSMAL SWEATS: BARELY PERCEPTIBLE SWEATING. PALMS MOIST
AUDITORY DISTURBANCES: NOT PRESENT
TOTAL SCORE: 10
ORIENTATION AND CLOUDING OF SENSORIUM: ORIENTED AND CAN DO SERIAL ADDITIONS
TREMOR: TREMOR NOT VISIBLE BUT CAN BE FELT, FINGERTIP TO FINGERTIP
AGITATION: NORMAL ACTIVITY
NAUSEA AND VOMITING: NO NAUSEA AND NO VOMITING
AGITATION: NORMAL ACTIVITY
ANXIETY: MODERATELY ANXIOUS OR GUARDED, SO ANXIETY IS INFERRED
ORIENTATION AND CLOUDING OF SENSORIUM: ORIENTED AND CAN DO SERIAL ADDITIONS
AUDITORY DISTURBANCES: NOT PRESENT
HEADACHE, FULLNESS IN HEAD: NOT PRESENT
TOTAL SCORE: VERY MILD ITCHING, PINS AND NEEDLES SENSATION, BURNING OR NUMBNESS
VISUAL DISTURBANCES: VERY MILD SENSITIVITY
TREMOR: TREMOR NOT VISIBLE BUT CAN BE FELT, FINGERTIP TO FINGERTIP
PAROXYSMAL SWEATS: NO SWEAT VISIBLE
AUDITORY DISTURBANCES: NOT PRESENT
HEADACHE, FULLNESS IN HEAD: NOT PRESENT
AUDITORY DISTURBANCES: NOT PRESENT
NAUSEA AND VOMITING: NO NAUSEA AND NO VOMITING
TOTAL SCORE: 13
VISUAL DISTURBANCES: NOT PRESENT

## 2024-08-21 ASSESSMENT — PAIN DESCRIPTION - PAIN TYPE
TYPE: ACUTE PAIN

## 2024-08-21 ASSESSMENT — ENCOUNTER SYMPTOMS
ABDOMINAL PAIN: 1
ORTHOPNEA: 0
CARDIOVASCULAR NEGATIVE: 1
CHILLS: 1
WEAKNESS: 1
SEIZURES: 0
VOMITING: 1
EYES NEGATIVE: 1
CONSTIPATION: 0
HEARTBURN: 1
TREMORS: 1
PALPITATIONS: 0
MUSCULOSKELETAL NEGATIVE: 1
SHORTNESS OF BREATH: 0
LOSS OF CONSCIOUSNESS: 0
MYALGIAS: 0
HEMOPTYSIS: 0
HEADACHES: 1
NAUSEA: 1
SORE THROAT: 1
SPUTUM PRODUCTION: 0
RESPIRATORY NEGATIVE: 1
INSOMNIA: 1

## 2024-08-21 ASSESSMENT — FIBROSIS 4 INDEX: FIB4 SCORE: 0.66

## 2024-08-21 NOTE — PROGRESS NOTES
"[facility] Internal Medicine Daily Progress Note    Date of Service  8/21/2024    [facility] Team: [facility] Team   Attending: Gail Jackson M.d.  Senior Resident: Dr. Horta  Intern:  Dr. Marrero  Contact Number: [phone]    Chief Complaint  Praful Carmen is a 24 y.o. male with nausea/vomiting    Hospital Course    Praful Carmen is a 24 y.o. male with history of ongoing alcohol abuse, seizure, esophageal perforation secondary to vomiting treated conservatively in July 2024 who presented 8/19/2024 with complaints of intractable nausea and vomiting since 11 PM last night. Patient states he was previously sober for the last 3 months but had a relapse on Sunday as that was the anniversary of his sister's death. From there he drank an un-quantifiable amount of alcohol stating it was \"non stop\". His last known drink was 10 pm on Sunday. After the vomiting remained persistent for hours he came to the ED around 3 am for concerns he may have ruptured his esophagus again.    Upon presentation he is tachycardic with CIWA score 14.    Lactic acid 8.4 -> 5.3.WBC 18.6, glucose 153, CO2 16, anion gap 30, diagnostic alcohol level 14.9, lipase is not elevated.  He received 4 mg of Ativan IV, 2 L of Ringer lactate. Chest x-ray demonstrated no acute process, no sign of pneumomediastinum. CT abdomen demonstrated hepatic steatosis but no acute process.    Interval Problem Update  No acute events overnight.  Patient's CIWA scores improved with Librium taper w/ most recent scores up to 10.  Much better from yesterday.  Patient reported an episode of red/brown vomit.  This was not seen by nursing staff.  However, hemoglobin remained stable the patient still without bowel movement which is reassuring.  Low suspicion for GI bleed at this time and patient is also on high-dose PPIs regardless.  He is also requesting DVT prophylaxis as he is concerned about clots.  And avoid frequent lab draws at this time. Lidocaine patch and tylenol " ordered for thigh pain.    I have discussed this patient's plan of care and discharge plan at IDT rounds today with Case Management, Nursing, Nursing leadership, and other members of the IDT team.    Consultants/Specialty    Code Status  Full Code    Disposition  I have placed the appropriate orders for post-discharge needs.    Review of Systems  Review of Systems   Constitutional:  Positive for chills and malaise/fatigue.   HENT: Negative.     Eyes: Negative.    Respiratory: Negative.     Cardiovascular: Negative.    Gastrointestinal:  Positive for abdominal pain, heartburn and nausea.   Genitourinary: Negative.    Musculoskeletal: Negative.    Skin: Negative.    Neurological:  Positive for tremors and headaches.   Endo/Heme/Allergies: Negative.    Psychiatric/Behavioral:  Positive for substance abuse. The patient has insomnia.         Physical Exam  Temp:  [36.3 °C (97.3 °F)-36.5 °C (97.7 °F)] 36.5 °C (97.7 °F)  Pulse:  [] 102  Resp:  [16-17] 17  BP: (100-111)/(56-70) 111/70  SpO2:  [94 %-98 %] 98 %    Physical Exam  Constitutional:       General: He is in acute distress.      Appearance: He is normal weight.   HENT:      Head: Normocephalic and atraumatic.      Right Ear: Tympanic membrane, ear canal and external ear normal.      Left Ear: Tympanic membrane, ear canal and external ear normal.      Nose: Nose normal.      Mouth/Throat:      Mouth: Mucous membranes are moist.      Pharynx: Oropharynx is clear.   Eyes:      Extraocular Movements: Extraocular movements intact.      Conjunctiva/sclera: Conjunctivae normal.      Pupils: Pupils are equal, round, and reactive to light.   Cardiovascular:      Rate and Rhythm: Normal rate and regular rhythm.      Pulses: Normal pulses.      Heart sounds: Normal heart sounds.   Pulmonary:      Effort: Pulmonary effort is normal.      Breath sounds: Normal breath sounds.   Abdominal:      General: Abdomen is flat. Bowel sounds are normal.      Palpations: Abdomen is  soft.      Tenderness: There is abdominal tenderness.   Musculoskeletal:         General: Normal range of motion.      Cervical back: Normal range of motion.   Skin:     General: Skin is warm.      Capillary Refill: Capillary refill takes less than 2 seconds.   Neurological:      Mental Status: He is alert and oriented to person, place, and time.         Fluids    Intake/Output Summary (Last 24 hours) at 8/21/2024 1310  Last data filed at 8/21/2024 0800  Gross per 24 hour   Intake 600 ml   Output 0 ml   Net 600 ml       Laboratory  Recent Labs     08/19/24  0302 08/20/24  0314 08/21/24  0339   WBC 18.6* 6.7 8.4   RBC 5.78 4.81 5.33   HEMOGLOBIN 17.3 14.9 16.2   HEMATOCRIT 47.6 43.0 46.8   MCV 82.4 89.4 87.8   MCH 29.9 31.0 30.4   MCHC 36.3 34.7 34.6   RDW 37.4 41.1 38.8   PLATELETCT 351 197 212   MPV 9.8 10.2 9.9     Recent Labs     08/19/24  0302 08/20/24  0314 08/21/24  0339   SODIUM 140 137 137   POTASSIUM 3.4* 4.1 3.6   CHLORIDE 94* 105 102   CO2 16* 20 21   GLUCOSE 153* 80 85   BUN 11 7* 8   CREATININE 1.07 0.58 0.66   CALCIUM 9.9 8.9 9.5                   Imaging  CT-ABDOMEN-PELVIS WITH   Final Result         1. No acute process.   2. Hepatic steatosis.      DX-CHEST-PORTABLE (1 VIEW)   Final Result      No acute process.           Assessment/Plan  Problem Representation:    * Alcohol withdrawal (HCC)  Assessment & Plan  Last alcoholic beverage was last night, presented with nausea vomiting tremulousness.  History of alcohol withdrawal seizure, last time 1 month ago    PLAN  -[facility] protocol  -librium taper  -thiamine/folate/MVI  -Monitor and replace electrolytes  -Fall, aspiration, seizure precautions  -Alcohol cessation counseling by discharge  -lidocaine patch and tylenol for pain    Charity-Pope syndrome  Assessment & Plan  From intractable vomiting in setting of alcohol abuse    Resume PPI  Monitor h&h  No vomiting while inpatient  Gi cocktail prn    Drug abuse (HCC)  Assessment & Plan  Patient UDS pos for  cocaine, benzos, babiturates, cannabis, opiods, oxycodone     on quitting    Hypokalemia  Assessment & Plan  Replete electrolytes as appropriate    Mood disorder (HCC)  Assessment & Plan  Resume home zoloft    Abnormal CPK  Assessment & Plan  Mildly elevated. Likely resolved.    Lactic acid acidosis  Assessment & Plan  Lactic acid 8.4.  Probably secondary to hypovolemia due to nausea and vomiting  Given 2 L of Ringer lactate, repeat lactic 5.3 improving    Resolved    SIRS (systemic inflammatory response syndrome) (HCC)  Assessment & Plan  SIRS criteria identified on my evaluation include:  Tachycardia, with heart rate greater than 90 BPM and Leukocytosis, with WBC greater than 12,000    No infection identified on CT of the abdomen or chest x-ray    Continue to treat alcohol withdrawal         VTE prophylaxis: SCDs/TEDs    I have performed a physical exam and reviewed and updated ROS and Plan today (8/21/2024). In review of yesterday's note (8/20/2024), there are no changes except as documented above.

## 2024-08-21 NOTE — CARE PLAN
The patient is Stable - Low risk of patient condition declining or worsening    Shift Goals  Clinical Goals: CIWA, pain control  Patient Goals: pain control, rest  Family Goals: avelina    Progress made toward(s) clinical / shift goals:    Problem: Pain - Standard  Goal: Alleviation of pain or a reduction in pain to the patient’s comfort goal  Description: Target End Date:  Prior to discharge or change in level of care    Document on Vitals flowsheet    1.  Document pain using the appropriate pain scale per order or unit policy  2.  Educate and implement non-pharmacologic comfort measures (i.e. relaxation, distraction, massage, cold/heat therapy, etc.)  3.  Pain management medications as ordered  4.  Reassess pain after pain med administration per policy  5.  If opiods administered assess patient's response to pain medication is appropriate per POSS sedation scale  6.  Follow pain management plan developed in collaboration with patient and interdisciplinary team (including palliative care or pain specialists if applicable)  Outcome: Progressing  Note: Pain controlled with PRN pain medication. Lidocaine patch added to MAR.      Problem: Optimal Care for Alcohol Withdrawal  Goal: Optimal Care for the alcohol withdrawal patient  Description: Target End Date:  1 to 3 days    1.  Alcohol history screening done on admission  2.  CIWA-AR score assessment (includes assessment of nausea/vomiting, tremor, sweats, anxiety, agitation, tactile, visual and auditory disturbances, headache and orientation/sensorium).  Document on CIWA flowsheet.  3.  Follow CIWA-AR score protocol  4.  Frequent reorientation  5.  Monitor for fluid and electrolyte imbalance.  6.  Assess for respiratory depression due to sedation (pulse ox)  7.  Consider thiamine, multivitamins, folic acid and magnesium sulfate per provider order  8.  Collaborate with Social Workers/Case Management  9.  Collaborate with mental health  Outcome: Progressing  Note: CIWA  protocol continued.

## 2024-08-21 NOTE — PROGRESS NOTES
"[facility] Internal Medicine Daily Progress Note    Date of Service  8/20/2024    [facility] Team: R AN[facility] Team   Attending: Gail Jackson M.d.  Senior Resident: Dr. Horta  Intern:  Dr. Marrero  Contact Number: [phone]    Chief Complaint  Praful Carmen is a 24 y.o. male with nausea/vomiting    Hospital Course    Praful Carmen is a 24 y.o. male with history of ongoing alcohol abuse, seizure, esophageal perforation secondary to vomiting treated conservatively in July 2024 who presented 8/19/2024 with complaints of intractable nausea and vomiting since 11 PM last night. Patient states he was previously sober for the last 3 months but had a relapse on Sunday as that was the anniversary of his sister's death. From there he drank an un-quantifiable amount of alcohol stating it was \"non stop\". His last known drink was 10 pm on Sunday. After the vomiting remained persistent for hours he came to the ED around 3 am for concerns he may have ruptured his esophagus again.    Upon presentation he is tachycardic with CIWA score 14.    Lactic acid 8.4 -> 5.3.WBC 18.6, glucose 153, CO2 16, anion gap 30, diagnostic alcohol level 14.9, lipase is not elevated.  He received 4 mg of Ativan IV, 2 L of Ringer lactate. Chest x-ray demonstrated no acute process, no sign of pneumomediastinum. CT abdomen demonstrated hepatic steatosis but no acute process.    Interval Problem Update  Patient with elevated CIWA scores of 21. Librium taper initiated. Denies hallucinations at this time. No vomiting while he has been in the hospital. Continue with alcohol withdrawal treatment.    EKG abnormalities with mild ST elevations. Likely early repolarization pattern. Patient clinically denies chest pain. Troponin negative.    I have discussed this patient's plan of care and discharge plan at IDT rounds today with Case Management, Nursing, Nursing leadership, and other members of the IDT team.    Consultants/Specialty    Code Status  Full " Code    Disposition  I have placed the appropriate orders for post-discharge needs.    Review of Systems  Review of Systems   Constitutional:  Positive for chills and malaise/fatigue.   HENT: Negative.     Eyes: Negative.    Respiratory: Negative.     Cardiovascular: Negative.    Gastrointestinal:  Positive for abdominal pain, heartburn and nausea.   Genitourinary: Negative.    Musculoskeletal: Negative.    Skin: Negative.    Neurological:  Positive for tremors and headaches.   Endo/Heme/Allergies: Negative.    Psychiatric/Behavioral:  Positive for substance abuse. The patient has insomnia.         Physical Exam  Temp:  [36.1 °C (97 °F)-36.5 °C (97.7 °F)] 36.5 °C (97.7 °F)  Pulse:  [] 84  Resp:  [17-19] 17  BP: (104-143)/() 104/64  SpO2:  [86 %-99 %] 94 %    Physical Exam  Constitutional:       General: He is in acute distress.      Appearance: He is normal weight.   HENT:      Head: Normocephalic and atraumatic.      Right Ear: Tympanic membrane, ear canal and external ear normal.      Left Ear: Tympanic membrane, ear canal and external ear normal.      Nose: Nose normal.      Mouth/Throat:      Mouth: Mucous membranes are moist.      Pharynx: Oropharynx is clear.   Eyes:      Extraocular Movements: Extraocular movements intact.      Conjunctiva/sclera: Conjunctivae normal.      Pupils: Pupils are equal, round, and reactive to light.   Cardiovascular:      Rate and Rhythm: Normal rate and regular rhythm.      Pulses: Normal pulses.      Heart sounds: Normal heart sounds.   Pulmonary:      Effort: Pulmonary effort is normal.      Breath sounds: Normal breath sounds.   Abdominal:      General: Abdomen is flat. Bowel sounds are normal.      Palpations: Abdomen is soft.      Tenderness: There is abdominal tenderness.   Musculoskeletal:         General: Normal range of motion.      Cervical back: Normal range of motion.   Skin:     General: Skin is warm.      Capillary Refill: Capillary refill takes less  than 2 seconds.   Neurological:      Mental Status: He is alert and oriented to person, place, and time.         Fluids    Intake/Output Summary (Last 24 hours) at 8/20/2024 1812  Last data filed at 8/20/2024 1200  Gross per 24 hour   Intake 460 ml   Output 1550 ml   Net -1090 ml       Laboratory  Recent Labs     08/19/24  0302 08/20/24  0314   WBC 18.6* 6.7   RBC 5.78 4.81   HEMOGLOBIN 17.3 14.9   HEMATOCRIT 47.6 43.0   MCV 82.4 89.4   MCH 29.9 31.0   MCHC 36.3 34.7   RDW 37.4 41.1   PLATELETCT 351 197   MPV 9.8 10.2     Recent Labs     08/19/24  0302 08/20/24  0314   SODIUM 140 137   POTASSIUM 3.4* 4.1   CHLORIDE 94* 105   CO2 16* 20   GLUCOSE 153* 80   BUN 11 7*   CREATININE 1.07 0.58   CALCIUM 9.9 8.9                   Imaging  CT-ABDOMEN-PELVIS WITH   Final Result         1. No acute process.   2. Hepatic steatosis.      DX-CHEST-PORTABLE (1 VIEW)   Final Result      No acute process.           Assessment/Plan  Problem Representation:    * Alcohol withdrawal ([facility])  Assessment & Plan  Last alcoholic beverage was last night, presented with nausea vomiting tremulousness.  History of alcohol withdrawal seizure, last time 1 month ago    PLAN  -[facility] protocol  -librium taper  -thiamine/folate/MVI  -Monitor and replace electrolytes  -Fall, aspiration, seizure precautions  -Alcohol cessation counseling by discharge    Charity-Pope syndrome  Assessment & Plan  From intractable vomiting in setting of alcohol abuse    Resume PPI  Monitor h&h  No vomiting while inpatient  Gi cocktail prn    Drug abuse ([facility])  Assessment & Plan  Patient UDS pos for cocaine, benzos, babiturates, cannabis, opiods, oxycodone     on quitting    Hypokalemia  Assessment & Plan  Replete electrolytes as appropriate    Mood disorder ([facility])  Assessment & Plan  Resume home zoloft    Abnormal CPK  Assessment & Plan  Mildly elevated. Likely resolved.    Lactic acid acidosis  Assessment & Plan  Lactic acid 8.4.  Probably secondary to hypovolemia due  to nausea and vomiting  Given 2 L of Ringer lactate, repeat lactic 5.3 improving    Resolved    SIRS (systemic inflammatory response syndrome) ([facility])  Assessment & Plan  SIRS criteria identified on my evaluation include:  Tachycardia, with heart rate greater than 90 BPM and Leukocytosis, with WBC greater than 12,000    No infection identified on CT of the abdomen or chest x-ray    Continue to treat alcohol withdrawal         VTE prophylaxis: SCDs/TEDs    I have performed a physical exam and reviewed and updated ROS and Plan today (8/20/2024). In review of yesterday's note (8/19/2024), there are no changes except as documented above.

## 2024-08-21 NOTE — CARE PLAN
The patient is Stable - Low risk of patient condition declining or worsening    Shift Goals  Clinical Goals: CIWA, monitor vitals, pain control  Patient Goals: pain control, rest  Family Goals: avelina      Problem: Pain - Standard  Goal: Alleviation of pain or a reduction in pain to the patient’s comfort goal  Outcome: Progressing     Problem: Optimal Care for Alcohol Withdrawal  Goal: Optimal Care for the alcohol withdrawal patient  Outcome: Progressing     Problem: Seizure Precautions  Goal: Implementation of seizure precautions  Outcome: Progressing

## 2024-08-21 NOTE — ASSESSMENT & PLAN NOTE
From intractable vomiting in setting of alcohol abuse    Resume PPI  Monitor h&h  No vomiting while inpatient  Gi cocktail prn

## 2024-08-21 NOTE — PROGRESS NOTES
Assumed care of patient and received report from Candelaria GARCIA. Assessment completed.Pt A&Ox 4. Respirations are even and unlabored on room air. Tele monitor in place, call light and belongings are within reach. POC updated. Pt educated on room and call light, pt verbalized understanding. Needs met.

## 2024-08-21 NOTE — PROGRESS NOTES
Monitor Summary  Rhythm: SB/SR  Rate:   Ectopy: PVC  Measurements: .12/.10/.32  ---12 hr Chart Review---

## 2024-08-21 NOTE — NON-PROVIDER
"[facility] Internal Medicine Daily Progress Note    Date of Service  8/21/2024    R Team: R [facility] Team   Attending: Gail Jackson M.d.  Senior Resident: Dr. Horta  Contact Number: [phone]    Chief Complaint  Praful Carmen is a 24 y.o. male with hx of alcohol abuse, alcohol withdrawal with seizure, esophageal perforation admitted 8/19/2024 with alcohol withdrawal and intractable nausea and vomiting.    Hospital Course  Pt with hx of alcohol abuse, alcohol withdrawal with seizures, and esophageal perforation presented to the ER at 3am on 08/19 complaining of intractable nausea and vomiting, alcohol withdrawal, and throat pain. Pt was previously sober from alcohol for 3 months, noted that he relapsed on Thursday as he was struggling with the anniversary of his sister's death. Pt notes he drank multiple shots of hard liquor \"nonstop\" from Thursday 8/15 night to Sunday 8/18 night, with his last drink being around 10pm Sunday. He began vomiting at 11pm, and presented to the ER at 3 am as his previous provider had advised him to go to the hospital if he was worried he had ruptured his esophagus again. He also noted he felt anxious, tremulous, and sweaty.     Pt evaluated in the ER, was noted to have elevated BP of 149/89, tachycardia with HR of 109, tachypnea with RR of 20, and spO2 of 93% on room air. His physical exam was significant for diaphoresis, pale appearance, dry oral mucosa, TTP in epigastrum and periumbilical region with voluntary guarding, carpal spam of the right upper extremity, and anxious and tearful mood. His labs in the ER demonstrated an alcohol level of 14.9, elevated WBC of 18.6, potassium of 3.4, chloride of 94, CO2 of 16, AG of 30, glucose of 153, and lactic acid of 8.4. VBG demonstrated pH of 7.47, Pco2 of 31.5, Po2 of 69, and HCO3 of 23. Chest x-ray demonstrated no acute process, no sign of pneumomediastinum. CT abdomen demonstrated hepatic steatosis but no acute process.     In the ER, " "he was given lorazepam 2mg IV, morphine 4mg IV, Zofran 4mg IV, famotidine 20mg IV, and 3L of LR. He was later admitted to the telemetry floor for observation and monitoring.     Interval Problem Update  Pt states he tried to eat some food yesterday and become nauseous. He reports he vomited 2x yesterday, describes 1st event as thick brown vomit, 2nd event as orange liquid vomit.  Pt notes he had one episode of bright red liquid stool yesterday, has not had a bowel movement since.   He notes his abd pain has improved, but is still rated at 6/10. Pt is still anxious, tremulous.  CIWA scores have improved, latest score was 2 this morning.  No other acute events.    This patient's plan of care and discharge plan was discussed at IDT rounds today with Case Management, Nursing, Nursing leadership, and other members of the IDT team.    Code Status  Full Code    Disposition  The patient is not medically cleared for discharge to home or a post-acute facility.      Review of Systems  Review of Systems   HENT:  Positive for sore throat.    Respiratory:  Negative for hemoptysis, sputum production and shortness of breath.    Cardiovascular:  Negative for chest pain, palpitations, orthopnea and leg swelling.   Gastrointestinal:  Positive for abdominal pain, nausea and vomiting. Negative for constipation.        Noted bright red stool yesterday, brown and orange emesis 2x yesterday   Musculoskeletal:  Negative for myalgias.   Neurological:  Positive for tremors, weakness and headaches. Negative for seizures and loss of consciousness.   Psychiatric/Behavioral:          Pt reports seeing \"dots\" in his vision        Physical Exam  Temp:  [36.3 °C (97.3 °F)-36.5 °C (97.7 °F)] 36.4 °C (97.5 °F)  Pulse:  [56-97] 71  Resp:  [16-18] 16  BP: (100-142)/() 104/62  SpO2:  [94 %-98 %] 95 %    Physical Exam  Cardiovascular:      Rate and Rhythm: Normal rate and regular rhythm.      Pulses: Normal pulses.      Heart sounds: Normal heart " sounds, S1 normal and S2 normal.   Pulmonary:      Effort: Pulmonary effort is normal.      Breath sounds: Normal breath sounds. Decreased air movement present.   Abdominal:      General: Abdomen is flat. There is no distension.      Palpations: Abdomen is soft.      Tenderness: There is generalized abdominal tenderness and tenderness in the right lower quadrant. There is no rebound. Negative signs include Laureano's sign.      Comments: voluntary guarding in periumbilical region   Musculoskeletal:      Cervical back: Neck supple. No edema or crepitus. No pain with movement.      Right lower leg: No edema.      Left lower leg: No edema.   Skin:     General: Skin is warm and dry.   Neurological:      Mental Status: He is lethargic.   Psychiatric:         Mood and Affect: Mood is anxious.         Speech: Speech is delayed.         Behavior: Behavior is slowed. Behavior is cooperative.         Fluids    Intake/Output Summary (Last 24 hours) at 8/21/2024 1123  Last data filed at 8/21/2024 0800  Gross per 24 hour   Intake 700 ml   Output 0 ml   Net 700 ml       Laboratory  Recent Labs     08/19/24  0302 08/20/24  0314 08/21/24  0339   WBC 18.6* 6.7 8.4   RBC 5.78 4.81 5.33   HEMOGLOBIN 17.3 14.9 16.2   HEMATOCRIT 47.6 43.0 46.8   MCV 82.4 89.4 87.8   MCH 29.9 31.0 30.4   MCHC 36.3 34.7 34.6   RDW 37.4 41.1 38.8   PLATELETCT 351 197 212   MPV 9.8 10.2 9.9     Recent Labs     08/19/24  0302 08/20/24  0314 08/21/24  0339   SODIUM 140 137 137   POTASSIUM 3.4* 4.1 3.6   CHLORIDE 94* 105 102   CO2 16* 20 21   GLUCOSE 153* 80 85   BUN 11 7* 8   CREATININE 1.07 0.58 0.66   CALCIUM 9.9 8.9 9.5                   Imaging   Most Recent   CT-ABDOMEN-PELVIS WITH Rpt  8/19/24 04:00   1. No acute process.  2. Hepatic steatosis.     Most Recent   DX-CHEST-PORTABLE (1 VIEW) Rpt  8/19/24 03:26   IMPRESSION: No acute process.    Assessment/Plan  Problem Representation:   #Alcoholic Withdrawal  Pt with hx of alcohol use disorder presented to  "ER on 8/19 for alcohol withdrawal, tremor, intractable nausea and vomiting. UDS in ER was positive for barbiturates, benzodiazepines, cannabinoid metabolites, opiates, oxycodone, and cocaine. Blood alcohol level was elevated. Last drink was Saturday at 10pm, over 72 hours ago. Pt is still within window of concern for developing delirium tremens. Pt reports improved levels of anxiety, tremor, and agitation today. CIWA score this morning rated at 10. Pt appeared drowsy when speaking with him this morning, notes he felt he was feeling \"in and out of it.\"  -Continue Ativan 3mg prn for CIWA above 8.  -Chlordiazepoxide 25mg TID titration.  -Continue multivitamin, B1 and B9 administration PO.    #Rhabdomyolysis  Pt presented to the ER with muscle pain, cramping, abdominal pain, and elevated CPK of 310. Today, his CPK has decreased to 180, pt continues to report red and orange colored urine. He notes muscle pain and soreness have now resolved.  -D/c IV fluids for rhabdomyolysis as this condition appears to have resolved.    #Nausea and Vomiting  Pt presented to the ER on 8/19 for intractable nausea and vomiting. He notes he had vomited 2x yesterday, describes emesis as brown and thick in first episode, later orange and watery in second episode. He notes he was not able to keep much food down yesterday, only apple juice and water were well tolerated. Pt was reassured that his stable hemoglobin and clinical presentation do not indicate an active GI bleed.  -Due to pt history of esophageal perforation, nausea control is prioritized during this admission. 4mg PO Zofran administration changed from prn to q4hrs.  -Begin 40mg pantoprazole PO QD.    I have performed a physical exam and reviewed and updated ROS and Plan today (8/21/2024). In review of yesterday's note (8/20/2024), there are no changes except as documented above.          "

## 2024-08-22 PROCEDURE — 700102 HCHG RX REV CODE 250 W/ 637 OVERRIDE(OP)

## 2024-08-22 PROCEDURE — 700111 HCHG RX REV CODE 636 W/ 250 OVERRIDE (IP): Mod: JZ

## 2024-08-22 PROCEDURE — 770020 HCHG ROOM/CARE - TELE (206)

## 2024-08-22 PROCEDURE — A9270 NON-COVERED ITEM OR SERVICE: HCPCS

## 2024-08-22 PROCEDURE — 99232 SBSQ HOSP IP/OBS MODERATE 35: CPT | Mod: GC | Performed by: INTERNAL MEDICINE

## 2024-08-22 PROCEDURE — 700111 HCHG RX REV CODE 636 W/ 250 OVERRIDE (IP)

## 2024-08-22 PROCEDURE — 700102 HCHG RX REV CODE 250 W/ 637 OVERRIDE(OP): Performed by: INTERNAL MEDICINE

## 2024-08-22 PROCEDURE — A9270 NON-COVERED ITEM OR SERVICE: HCPCS | Performed by: INTERNAL MEDICINE

## 2024-08-22 PROCEDURE — 700111 HCHG RX REV CODE 636 W/ 250 OVERRIDE (IP): Performed by: INTERNAL MEDICINE

## 2024-08-22 PROCEDURE — 700101 HCHG RX REV CODE 250

## 2024-08-22 RX ORDER — SUCRALFATE ORAL 1 G/10ML
1 SUSPENSION ORAL EVERY 6 HOURS
Status: DISCONTINUED | OUTPATIENT
Start: 2024-08-22 | End: 2024-08-23 | Stop reason: HOSPADM

## 2024-08-22 RX ORDER — ENOXAPARIN SODIUM 100 MG/ML
40 INJECTION SUBCUTANEOUS DAILY
Status: DISCONTINUED | OUTPATIENT
Start: 2024-08-22 | End: 2024-08-23 | Stop reason: HOSPADM

## 2024-08-22 RX ORDER — SUCRALFATE ORAL 1 G/10ML
1 SUSPENSION ORAL EVERY 6 HOURS
Status: CANCELLED | OUTPATIENT
Start: 2024-08-22

## 2024-08-22 RX ORDER — CHLORDIAZEPOXIDE HYDROCHLORIDE 25 MG/1
25 CAPSULE, GELATIN COATED ORAL 2 TIMES DAILY
Status: DISCONTINUED | OUTPATIENT
Start: 2024-08-22 | End: 2024-08-23

## 2024-08-22 RX ORDER — CHLORDIAZEPOXIDE HYDROCHLORIDE 25 MG/1
25 CAPSULE, GELATIN COATED ORAL EVERY 8 HOURS
Status: DISCONTINUED | OUTPATIENT
Start: 2024-08-22 | End: 2024-08-22

## 2024-08-22 RX ADMIN — THERA TABS 1 TABLET: TAB at 06:13

## 2024-08-22 RX ADMIN — OXYCODONE HYDROCHLORIDE 10 MG: 10 TABLET ORAL at 21:29

## 2024-08-22 RX ADMIN — OMEPRAZOLE 40 MG: 20 CAPSULE, DELAYED RELEASE ORAL at 18:22

## 2024-08-22 RX ADMIN — ONDANSETRON 4 MG: 4 TABLET, ORALLY DISINTEGRATING ORAL at 02:09

## 2024-08-22 RX ADMIN — Medication 100 MG: at 06:13

## 2024-08-22 RX ADMIN — OXYCODONE HYDROCHLORIDE 10 MG: 10 TABLET ORAL at 18:36

## 2024-08-22 RX ADMIN — HEPARIN SODIUM 5000 UNITS: 5000 INJECTION, SOLUTION INTRAVENOUS; SUBCUTANEOUS at 14:05

## 2024-08-22 RX ADMIN — FOLIC ACID 1 MG: 1 TABLET ORAL at 06:14

## 2024-08-22 RX ADMIN — ONDANSETRON 4 MG: 4 TABLET, ORALLY DISINTEGRATING ORAL at 14:05

## 2024-08-22 RX ADMIN — LORAZEPAM 1 MG: 1 TABLET ORAL at 14:50

## 2024-08-22 RX ADMIN — OXYCODONE HYDROCHLORIDE 5 MG: 5 TABLET ORAL at 02:15

## 2024-08-22 RX ADMIN — ENOXAPARIN SODIUM 40 MG: 100 INJECTION SUBCUTANEOUS at 21:29

## 2024-08-22 RX ADMIN — LORAZEPAM 1 MG: 1 TABLET ORAL at 02:09

## 2024-08-22 RX ADMIN — SERTRALINE HYDROCHLORIDE 25 MG: 50 TABLET ORAL at 06:14

## 2024-08-22 RX ADMIN — LORAZEPAM 1 MG: 1 TABLET ORAL at 18:36

## 2024-08-22 RX ADMIN — CHLORDIAZEPOXIDE HYDROCHLORIDE 25 MG: 25 CAPSULE ORAL at 06:14

## 2024-08-22 RX ADMIN — GABAPENTIN 100 MG: 250 SOLUTION ORAL at 06:13

## 2024-08-22 RX ADMIN — LORAZEPAM 0.5 MG: 1 TABLET ORAL at 10:39

## 2024-08-22 RX ADMIN — LORAZEPAM 2 MG: 2 TABLET ORAL at 08:48

## 2024-08-22 RX ADMIN — HEPARIN SODIUM 5000 UNITS: 5000 INJECTION, SOLUTION INTRAVENOUS; SUBCUTANEOUS at 06:13

## 2024-08-22 RX ADMIN — LIDOCAINE HYDROCHLORIDE 15 ML: 20 SOLUTION OROPHARYNGEAL at 06:14

## 2024-08-22 RX ADMIN — LORAZEPAM 1 MG: 1 TABLET ORAL at 06:23

## 2024-08-22 RX ADMIN — ONDANSETRON 4 MG: 4 TABLET, ORALLY DISINTEGRATING ORAL at 21:29

## 2024-08-22 RX ADMIN — OXYCODONE HYDROCHLORIDE 10 MG: 10 TABLET ORAL at 12:22

## 2024-08-22 RX ADMIN — LIDOCAINE HYDROCHLORIDE 15 ML: 20 SOLUTION OROPHARYNGEAL at 12:22

## 2024-08-22 RX ADMIN — SUCRALFATE 1 G: 1 SUSPENSION ORAL at 18:21

## 2024-08-22 RX ADMIN — OXYCODONE HYDROCHLORIDE 5 MG: 5 TABLET ORAL at 08:48

## 2024-08-22 RX ADMIN — LIDOCAINE 1 PATCH: 4 PATCH TOPICAL at 14:10

## 2024-08-22 RX ADMIN — CHLORDIAZEPOXIDE HYDROCHLORIDE 25 MG: 25 CAPSULE ORAL at 00:40

## 2024-08-22 RX ADMIN — CHLORDIAZEPOXIDE HYDROCHLORIDE 25 MG: 25 CAPSULE ORAL at 18:22

## 2024-08-22 RX ADMIN — SENNOSIDES AND DOCUSATE SODIUM 2 TABLET: 50; 8.6 TABLET ORAL at 18:22

## 2024-08-22 RX ADMIN — ACETAMINOPHEN 650 MG: 325 TABLET ORAL at 14:50

## 2024-08-22 RX ADMIN — PANTOPRAZOLE SODIUM 40 MG: 40 INJECTION, POWDER, FOR SOLUTION INTRAVENOUS at 06:13

## 2024-08-22 ASSESSMENT — LIFESTYLE VARIABLES
ANXIETY: MODERATELY ANXIOUS OR GUARDED, SO ANXIETY IS INFERRED
TOTAL SCORE: 10
AUDITORY DISTURBANCES: NOT PRESENT
ANXIETY: *
PAROXYSMAL SWEATS: NO SWEAT VISIBLE
AUDITORY DISTURBANCES: NOT PRESENT
ORIENTATION AND CLOUDING OF SENSORIUM: ORIENTED AND CAN DO SERIAL ADDITIONS
HEADACHE, FULLNESS IN HEAD: MILD
VISUAL DISTURBANCES: VERY MILD SENSITIVITY
ANXIETY: MILDLY ANXIOUS
AGITATION: SOMEWHAT MORE THAN NORMAL ACTIVITY
TOTAL SCORE: VERY MILD ITCHING, PINS AND NEEDLES SENSATION, BURNING OR NUMBNESS
ORIENTATION AND CLOUDING OF SENSORIUM: ORIENTED AND CAN DO SERIAL ADDITIONS
NAUSEA AND VOMITING: NO NAUSEA AND NO VOMITING
ANXIETY: MODERATELY ANXIOUS OR GUARDED, SO ANXIETY IS INFERRED
TOTAL SCORE: 9
ORIENTATION AND CLOUDING OF SENSORIUM: ORIENTED AND CAN DO SERIAL ADDITIONS
HEADACHE, FULLNESS IN HEAD: MILD
TREMOR: TREMOR NOT VISIBLE BUT CAN BE FELT, FINGERTIP TO FINGERTIP
HEADACHE, FULLNESS IN HEAD: MODERATELY SEVERE
AUDITORY DISTURBANCES: NOT PRESENT
TOTAL SCORE: 10
TREMOR: TREMOR NOT VISIBLE BUT CAN BE FELT, FINGERTIP TO FINGERTIP
AGITATION: NORMAL ACTIVITY
ORIENTATION AND CLOUDING OF SENSORIUM: ORIENTED AND CAN DO SERIAL ADDITIONS
AUDITORY DISTURBANCES: NOT PRESENT
AGITATION: SOMEWHAT MORE THAN NORMAL ACTIVITY
AGITATION: NORMAL ACTIVITY
PAROXYSMAL SWEATS: NO SWEAT VISIBLE
VISUAL DISTURBANCES: NOT PRESENT
HEADACHE, FULLNESS IN HEAD: VERY SEVERE
TOTAL SCORE: VERY MILD ITCHING, PINS AND NEEDLES SENSATION, BURNING OR NUMBNESS
PAROXYSMAL SWEATS: NO SWEAT VISIBLE
NAUSEA AND VOMITING: NO NAUSEA AND NO VOMITING
NAUSEA AND VOMITING: NO NAUSEA AND NO VOMITING
HEADACHE, FULLNESS IN HEAD: VERY MILD
AGITATION: NORMAL ACTIVITY
HEADACHE, FULLNESS IN HEAD: MODERATELY SEVERE
ANXIETY: *
PAROXYSMAL SWEATS: NO SWEAT VISIBLE
PAROXYSMAL SWEATS: NO SWEAT VISIBLE
TREMOR: *
AGITATION: SOMEWHAT MORE THAN NORMAL ACTIVITY
NAUSEA AND VOMITING: NO NAUSEA AND NO VOMITING
TREMOR: TREMOR NOT VISIBLE BUT CAN BE FELT, FINGERTIP TO FINGERTIP
ORIENTATION AND CLOUDING OF SENSORIUM: ORIENTED AND CAN DO SERIAL ADDITIONS
AUDITORY DISTURBANCES: NOT PRESENT
SUBSTANCE_ABUSE: 1
VISUAL DISTURBANCES: VERY MILD SENSITIVITY
TOTAL SCORE: 9
TREMOR: NO TREMOR
TOTAL SCORE: 12
VISUAL DISTURBANCES: VERY MILD SENSITIVITY
VISUAL DISTURBANCES: VERY MILD SENSITIVITY
AUDITORY DISTURBANCES: NOT PRESENT
VISUAL DISTURBANCES: VERY MILD SENSITIVITY
NAUSEA AND VOMITING: NO NAUSEA AND NO VOMITING
PAROXYSMAL SWEATS: NO SWEAT VISIBLE
NAUSEA AND VOMITING: NO NAUSEA AND NO VOMITING
ANXIETY: MODERATELY ANXIOUS OR GUARDED, SO ANXIETY IS INFERRED
TOTAL SCORE: 5
TREMOR: *
ORIENTATION AND CLOUDING OF SENSORIUM: ORIENTED AND CAN DO SERIAL ADDITIONS

## 2024-08-22 ASSESSMENT — ENCOUNTER SYMPTOMS
SHORTNESS OF BREATH: 0
HEMOPTYSIS: 0
ABDOMINAL PAIN: 1
COUGH: 0
NERVOUS/ANXIOUS: 1
TINGLING: 0
INSOMNIA: 1
FEVER: 0
RESPIRATORY NEGATIVE: 1
CARDIOVASCULAR NEGATIVE: 1
SPEECH CHANGE: 0
MUSCULOSKELETAL NEGATIVE: 1
SEIZURES: 0
HEARTBURN: 1
FOCAL WEAKNESS: 0
SPUTUM PRODUCTION: 0
NAUSEA: 1
SORE THROAT: 1
EYES NEGATIVE: 1
HEADACHES: 1
TREMORS: 1
VOMITING: 0
DIAPHORESIS: 0
CHILLS: 0
MYALGIAS: 0
DIARRHEA: 0
WHEEZING: 0

## 2024-08-22 ASSESSMENT — PAIN DESCRIPTION - PAIN TYPE
TYPE: ACUTE PAIN

## 2024-08-22 ASSESSMENT — FIBROSIS 4 INDEX: FIB4 SCORE: 0.66

## 2024-08-22 NOTE — PROGRESS NOTES
Patient complaining of left thigh pain and requesting DVT prophylaxis. Provider notified. Order for lidocaine patch and heparin placed by provider.

## 2024-08-22 NOTE — PROGRESS NOTES
"[facility] Internal Medicine Daily Progress Note    Date of Service  8/22/2024    R Team: R ANNA [facility] Team   Attending: Gail Jackson M.d.  Senior Resident: Dr. Horta  Contact Number: [phone]    Chief Complaint  Praful Carmen is a 24 y.o. male with hx of alcohol abuse, alcohol withdrawal with seizure, esophageal perforation admitted 8/19/2024 with alcohol withdrawal and intractable nausea and vomiting.    Hospital Course  Pt with hx of alcohol abuse, alcohol withdrawal with seizures, and esophageal perforation presented to the ER at 3am on 08/19 complaining of intractable nausea and vomiting, alcohol withdrawal, and throat pain. Pt was previously sober from alcohol for 3 months, noted that he relapsed on Thursday as he was struggling with the anniversary of his sister's death. Pt notes he drank multiple shots of hard liquor \"nonstop\" from Thursday 8/15 night to Sunday 8/18 night, with his last drink being around 10pm Sunday. He began vomiting at 11pm, and presented to the ER at 3 am as his previous provider had advised him to go to the hospital if he was worried he had ruptured his esophagus again. He also noted he felt anxious, tremulous, and sweaty.     Pt evaluated in the ER, was noted to have elevated BP of 149/89, tachycardia with HR of 109, tachypnea with RR of 20, and spO2 of 93% on room air. His physical exam was significant for diaphoresis, pale appearance, dry oral mucosa, TTP in epigastrum and periumbilical region with voluntary guarding, carpal spam of the right upper extremity, and anxious and tearful mood. His labs in the ER demonstrated an alcohol level of 14.9, elevated WBC of 18.6, potassium of 3.4, chloride of 94, CO2 of 16, AG of 30, glucose of 153, and lactic acid of 8.4. VBG demonstrated pH of 7.47, Pco2 of 31.5, Po2 of 69, and HCO3 of 23. Chest x-ray demonstrated no acute process, no sign of pneumomediastinum. CT abdomen demonstrated hepatic steatosis but no acute process.     In the ER, " "he was given lorazepam 2mg IV, morphine 4mg IV, Zofran 4mg IV, famotidine 20mg IV, and 3L of LR. He was later admitted to the telemetry floor for observation and monitoring.     Interval Problem Update  Pt states he was having left leg pain yesterday and requested DVT prophylaxis, today he notes pain has resolved. He was given heparin 5000 units injection 2x yesterday and 1x today.  Pt notes he continues to have throat pain, as well as lower abdominal pain and epigastric pain.  He reports he is still feeling anxious, and feels he needs to constantly shake his leg.    This patient's plan of care and discharge plan was discussed at IDT rounds today with Case Management, Nursing, Nursing leadership, and other members of the IDT team.    Code Status  Full Code    Disposition  Medically Cleared  I have placed the appropriate orders for post-discharge needs.    Review of Systems  Review of Systems   Constitutional:  Negative for chills, diaphoresis and fever.   HENT:  Positive for sore throat.    Respiratory:  Negative for cough, hemoptysis, sputum production, shortness of breath and wheezing.    Gastrointestinal:  Positive for abdominal pain, heartburn and nausea. Negative for diarrhea and vomiting.   Musculoskeletal:  Negative for myalgias.   Neurological:  Positive for tremors and headaches. Negative for tingling, speech change, focal weakness and seizures.   Psychiatric/Behavioral:  The patient is nervous/anxious.         Visual disturbances described as \"white dots\" all over his field of vision        Physical Exam  Temp:  [36.1 °C (97 °F)-36.6 °C (97.8 °F)] 36.6 °C (97.8 °F)  Pulse:  [] 66  Resp:  [16-17] 16  BP: ()/(55-73) 104/63  SpO2:  [94 %-98 %] 96 %    Physical Exam  Constitutional:       Appearance: Normal appearance. He is not diaphoretic.   Cardiovascular:      Rate and Rhythm: Normal rate and regular rhythm.      Pulses: Normal pulses.      Heart sounds: No murmur heard.     No gallop. "   Pulmonary:      Effort: Pulmonary effort is normal. No respiratory distress.      Breath sounds: No stridor. Wheezing present. No rhonchi or rales.      Comments: Wheezes in all lung sounds bilaterally  Chest:      Chest wall: No tenderness.   Abdominal:      General: Abdomen is flat. There is no distension.      Palpations: Abdomen is soft.      Tenderness: There is abdominal tenderness. There is guarding. There is no rebound.   Musculoskeletal:         General: No swelling or tenderness.      Cervical back: No tenderness or crepitus.      Right lower leg: No edema.      Left lower leg: No edema.   Neurological:      General: No focal deficit present.      Mental Status: He is alert and oriented to person, place, and time.   Psychiatric:         Behavior: Behavior normal.       Fluids    Intake/Output Summary (Last 24 hours) at 8/22/2024 0824  Last data filed at 8/22/2024 0408  Gross per 24 hour   Intake 280 ml   Output 0 ml   Net 280 ml       Laboratory  Recent Labs     08/20/24  0314 08/21/24  0339   WBC 6.7 8.4   RBC 4.81 5.33   HEMOGLOBIN 14.9 16.2   HEMATOCRIT 43.0 46.8   MCV 89.4 87.8   MCH 31.0 30.4   MCHC 34.7 34.6   RDW 41.1 38.8   PLATELETCT 197 212   MPV 10.2 9.9     Recent Labs     08/20/24  0314 08/21/24  0339   SODIUM 137 137   POTASSIUM 4.1 3.6   CHLORIDE 105 102   CO2 20 21   GLUCOSE 80 85   BUN 7* 8   CREATININE 0.58 0.66   CALCIUM 8.9 9.5                   Imaging   Most Recent   DX-CHEST-PORTABLE (1 VIEW) Rpt  8/19/24 03:26   IMPRESSION: No acute process.     Most Recent   CT-ABDOMEN-PELVIS WITH Rpt  8/19/24 04:00   IMPRESSION:  1. No acute process.  2. Hepatic steatosis.      Assessment/Plan  Problem Representation:    #Alcohol Withdrawal  Pt with hx of alcohol use disorder presented to the ER on 08/19 for alcohol withdrawal, tremor, intractable nausea and vomiting. UDS was positive for barbiturates, benzodiazepines, cannabinoid metabolites, opiates, oxycodone, and cocaine. Blood alcohol level  "was elevated on admission. Last drink was Saturday at 10pm, over 96 hours ago. Pt is still in window for developing delirium tremens. He continues to note tremor, anxiety, and visual disturbances reported as \"white dots\" in his visual fields, however states tremor and agitation have overall improved since admission.  -Librium 25mg taper, continue CIWA protocol and regular checks.  -Continue multivitamin, B1, B9 administration PO.    #Abdominal Pain  Pt reports persistent abdominal pain present on admission, has only decreased from 8/10 on admission to 6/10. Pain is most notably in lower abdomen, with only mild epigastric tenderness.  -Begin omeprazole 40mg BID administration, discontinue pantoprazole 40mg IV.  -Begin sucralfate 1g Q6hr  -Continue GI cocktail Q6hr for heartburn  -Diet advanced to GI soft low fiber diet as pt can tolerate.    #Nausea and Vomiting  Pt presented to ER on 08/18 for intractable nausea and vomiting. He is now on scheduled Zofran 4mg Q4hrs for prevention of vomiting considering his history of esophageal perforation.      VTE prophylaxis: enoxaparin ppx    I have performed a physical exam and reviewed and updated ROS and Plan today (8/22/2024). In review of yesterday's note (8/21/2024), there are no changes except as documented above.          "

## 2024-08-22 NOTE — CARE PLAN
Problem: Pain - Standard  Goal: Alleviation of pain or a reduction in pain to the patient’s comfort goal  Outcome: Progressing   Will continue with pain assessment and management per MAR.     Problem: Nutrition  Goal: Patient's nutritional and fluid intake will be adequate or improve  Outcome: Progressing   Patient's diet order change to GI soft, patient was able to tolerate the diet however, patient complains of constant GI pain. Will continue to monitor.     Problem: Gastrointestinal Irritability  Goal: Nausea and vomiting will be absent or improve  Outcome: Progressing   Per patient, only feeling nauseous upon standing, will continue to manage nausea with scheduled Zofran meds.        The patient is Stable - Low risk of patient condition declining or worsening    Shift Goals  Clinical Goals: CIWA, Safety, pain control,   Patient Goals: Wants to go home, pain control  Family Goals: Wants patient to    Progress made toward(s) clinical / shift goals:      Patient is not progressing towards the following goals:

## 2024-08-22 NOTE — CARE PLAN
The patient is Watcher - Medium risk of patient condition declining or worsening    Shift Goals  Clinical Goals: CIWA, safety  Patient Goals: Pain control, sleep  Family Goals: Updates    Progress made toward(s) clinical / shift goals:      Problem: Pain - Standard  Goal: Alleviation of pain or a reduction in pain to the patient’s comfort goal  Description: Target End Date:  Prior to discharge or change in level of care    Document on Vitals flowsheet    1.  Document pain using the appropriate pain scale per order or unit policy  2.  Educate and implement non-pharmacologic comfort measures (i.e. relaxation, distraction, massage, cold/heat therapy, etc.)  3.  Pain management medications as ordered  4.  Reassess pain after pain med administration per policy  5.  If opiods administered assess patient's response to pain medication is appropriate per POSS sedation scale  6.  Follow pain management plan developed in collaboration with patient and interdisciplinary team (including palliative care or pain specialists if applicable)  Outcome: Progressing     Problem: Optimal Care for Alcohol Withdrawal  Goal: Optimal Care for the alcohol withdrawal patient  Description: Target End Date:  1 to 3 days    1.  Alcohol history screening done on admission  2.  CIWA-AR score assessment (includes assessment of nausea/vomiting, tremor, sweats, anxiety, agitation, tactile, visual and auditory disturbances, headache and orientation/sensorium).  Document on CIWA flowsheet.  3.  Follow CIWA-AR score protocol  4.  Frequent reorientation  5.  Monitor for fluid and electrolyte imbalance.  6.  Assess for respiratory depression due to sedation (pulse ox)  7.  Consider thiamine, multivitamins, folic acid and magnesium sulfate per provider order  8.  Collaborate with Social Workers/Case Management  9.  Collaborate with mental health  Outcome: Progressing

## 2024-08-22 NOTE — PROGRESS NOTES
"[facility] Internal Medicine Daily Progress Note    Date of Service  8/22/2024    [facility] Team: [facility] Team   Attending: Gail Jackson M.d.  Senior Resident: Dr. Horta  Intern:  Dr. Marrero  Contact Number: [phone]    Chief Complaint  Praful Carmen is a 24 y.o. male with nausea/vomiting    Hospital Course    Praful Carmen is a 24 y.o. male with history of ongoing alcohol abuse, seizure, esophageal perforation secondary to vomiting treated conservatively in July 2024 who presented 8/19/2024 with complaints of intractable nausea and vomiting since 11 PM last night. Patient states he was previously sober for the last 3 months but had a relapse on Sunday as that was the anniversary of his sister's death. From there he drank an un-quantifiable amount of alcohol stating it was \"non stop\". His last known drink was 10 pm on Sunday. After the vomiting remained persistent for hours he came to the ED around 3 am for concerns he may have ruptured his esophagus again.    Upon presentation he is tachycardic with CIWA score 14.    Lactic acid 8.4 -> 5.3.WBC 18.6, glucose 153, CO2 16, anion gap 30, diagnostic alcohol level 14.9, lipase is not elevated.  He received 4 mg of Ativan IV, 2 L of Ringer lactate. Chest x-ray demonstrated no acute process, no sign of pneumomediastinum. CT abdomen demonstrated hepatic steatosis but no acute process.    Interval Problem Update  No acute events overnight.  Patient's CIWA scores improved with Librium taper w/ most recent scores up to 10.  Much better from yesterday. Pt doesn't seem to be in distress today and doesn't have any new complaints or concerns.  De-escalating his librium taper. Transitioned him from heparin to lovenox.   Continue CIWA.  Monitor for refeeding syndrome.       Code Status  Full Code    Disposition  I have placed the appropriate orders for post-discharge needs.    Review of Systems  Review of Systems   HENT: Negative.     Eyes: Negative.    Respiratory: " Negative.     Cardiovascular: Negative.    Genitourinary: Negative.    Musculoskeletal: Negative.    Skin: Negative.    Neurological:  Positive for tremors.   Endo/Heme/Allergies: Negative.    Psychiatric/Behavioral:  Positive for substance abuse. The patient has insomnia.         Physical Exam  Temp:  [36.1 °C (97 °F)-36.8 °C (98.2 °F)] 36.8 °C (98.2 °F)  Pulse:  [66-99] 76  Resp:  [15-17] 15  BP: ()/() 126/104  SpO2:  [94 %-99 %] 99 %    Physical Exam  Constitutional:       Appearance: He is normal weight.   HENT:      Head: Normocephalic and atraumatic.      Right Ear: Tympanic membrane, ear canal and external ear normal.      Left Ear: Tympanic membrane, ear canal and external ear normal.      Nose: Nose normal.      Mouth/Throat:      Mouth: Mucous membranes are moist.      Pharynx: Oropharynx is clear.   Eyes:      Extraocular Movements: Extraocular movements intact.      Conjunctiva/sclera: Conjunctivae normal.      Pupils: Pupils are equal, round, and reactive to light.   Cardiovascular:      Rate and Rhythm: Normal rate and regular rhythm.      Pulses: Normal pulses.      Heart sounds: Normal heart sounds.   Pulmonary:      Effort: Pulmonary effort is normal.      Breath sounds: Normal breath sounds.   Abdominal:      General: Abdomen is flat. Bowel sounds are normal.      Palpations: Abdomen is soft.   Musculoskeletal:         General: Normal range of motion.      Cervical back: Normal range of motion.   Skin:     General: Skin is warm.      Capillary Refill: Capillary refill takes less than 2 seconds.   Neurological:      Mental Status: He is alert and oriented to person, place, and time.         Fluids    Intake/Output Summary (Last 24 hours) at 8/22/2024 1555  Last data filed at 8/22/2024 1200  Gross per 24 hour   Intake 360 ml   Output 0 ml   Net 360 ml       Laboratory  Recent Labs     08/20/24  0314 08/21/24  0339   WBC 6.7 8.4   RBC 4.81 5.33   HEMOGLOBIN 14.9 16.2   HEMATOCRIT 43.0 46.8    MCV 89.4 87.8   MCH 31.0 30.4   MCHC 34.7 34.6   RDW 41.1 38.8   PLATELETCT 197 212   MPV 10.2 9.9     Recent Labs     08/20/24  0314 08/21/24  0339   SODIUM 137 137   POTASSIUM 4.1 3.6   CHLORIDE 105 102   CO2 20 21   GLUCOSE 80 85   BUN 7* 8   CREATININE 0.58 0.66   CALCIUM 8.9 9.5                   Imaging  CT-ABDOMEN-PELVIS WITH   Final Result         1. No acute process.   2. Hepatic steatosis.      DX-CHEST-PORTABLE (1 VIEW)   Final Result      No acute process.           Assessment/Plan  Problem Representation:    * Alcohol withdrawal ([facility])- (present on admission)  Assessment & Plan  Last alcoholic beverage was last night, presented with nausea vomiting tremulousness.  History of alcohol withdrawal seizure, last time 1 month ago    PLAN  -[facility] protocol  -librium taper  -thiamine/folate/MVI  -Monitor and replace electrolytes  -Fall, aspiration, seizure precautions  -Alcohol cessation counseling by discharge  -lidocaine patch and tylenol for pain    Mood disorder ([facility])  Assessment & Plan  Resume home zoloft    Drug abuse ([facility])  Assessment & Plan  Patient UDS pos for cocaine, benzos, babiturates, cannabis, opiods, oxycodone     on quitting    Charity-Pope syndrome  Assessment & Plan  From intractable vomiting in setting of alcohol abuse    Resume PPI  Monitor h&h  No vomiting while inpatient  Gi cocktail prn    Abnormal CPK  Assessment & Plan  Mildly elevated. Likely resolved.    Hypokalemia  Assessment & Plan  Replete electrolytes as appropriate    Lactic acid acidosis- (present on admission)  Assessment & Plan  Lactic acid 8.4.  Probably secondary to hypovolemia due to nausea and vomiting  Given 2 L of Ringer lactate, repeat lactic 5.3 improving    Resolved    SIRS (systemic inflammatory response syndrome) ([facility])- (present on admission)  Assessment & Plan  SIRS criteria identified on my evaluation include:  Tachycardia, with heart rate greater than 90 BPM and Leukocytosis, with WBC greater than  12,000    No infection identified on CT of the abdomen or chest x-ray    Continue to treat alcohol withdrawal         VTE prophylaxis: Lovenox.      I have performed a physical exam and reviewed and updated ROS and Plan today (8/22/2024). In review of yesterday's note (8/21/2024), there are no changes except as documented above.

## 2024-08-23 ENCOUNTER — PHARMACY VISIT (OUTPATIENT)
Dept: PHARMACY | Facility: MEDICAL CENTER | Age: 24
End: 2024-08-23
Payer: COMMERCIAL

## 2024-08-23 VITALS
DIASTOLIC BLOOD PRESSURE: 60 MMHG | SYSTOLIC BLOOD PRESSURE: 110 MMHG | HEIGHT: 67 IN | TEMPERATURE: 97.5 F | BODY MASS INDEX: 23.25 KG/M2 | WEIGHT: 148.15 LBS | HEART RATE: 72 BPM | OXYGEN SATURATION: 96 % | RESPIRATION RATE: 16 BRPM

## 2024-08-23 PROBLEM — E87.20 LACTIC ACID ACIDOSIS: Status: RESOLVED | Noted: 2024-08-19 | Resolved: 2024-08-23

## 2024-08-23 PROBLEM — E87.6 HYPOKALEMIA: Status: RESOLVED | Noted: 2024-08-19 | Resolved: 2024-08-23

## 2024-08-23 PROBLEM — R65.10 SIRS (SYSTEMIC INFLAMMATORY RESPONSE SYNDROME) (HCC): Status: RESOLVED | Noted: 2024-08-19 | Resolved: 2024-08-23

## 2024-08-23 PROBLEM — R74.8 ABNORMAL CPK: Status: RESOLVED | Noted: 2024-08-20 | Resolved: 2024-08-23

## 2024-08-23 PROCEDURE — 700102 HCHG RX REV CODE 250 W/ 637 OVERRIDE(OP)

## 2024-08-23 PROCEDURE — 700111 HCHG RX REV CODE 636 W/ 250 OVERRIDE (IP)

## 2024-08-23 PROCEDURE — A9270 NON-COVERED ITEM OR SERVICE: HCPCS

## 2024-08-23 PROCEDURE — 700102 HCHG RX REV CODE 250 W/ 637 OVERRIDE(OP): Performed by: INTERNAL MEDICINE

## 2024-08-23 PROCEDURE — A9270 NON-COVERED ITEM OR SERVICE: HCPCS | Performed by: INTERNAL MEDICINE

## 2024-08-23 PROCEDURE — 99239 HOSP IP/OBS DSCHRG MGMT >30: CPT | Mod: GC | Performed by: INTERNAL MEDICINE

## 2024-08-23 PROCEDURE — RXMED WILLOW AMBULATORY MEDICATION CHARGE

## 2024-08-23 RX ORDER — OMEPRAZOLE 40 MG/1
40 CAPSULE, DELAYED RELEASE ORAL DAILY
Qty: 30 CAPSULE | Refills: 0 | Status: SHIPPED | OUTPATIENT
Start: 2024-08-23 | End: 2024-09-22

## 2024-08-23 RX ORDER — ONDANSETRON 4 MG/1
4 TABLET, ORALLY DISINTEGRATING ORAL PRN
Qty: 10 TABLET | Refills: 0 | Status: SHIPPED | OUTPATIENT
Start: 2024-08-23

## 2024-08-23 RX ORDER — OMEPRAZOLE 40 MG/1
40 CAPSULE, DELAYED RELEASE ORAL 2 TIMES DAILY
Qty: 30 CAPSULE | Refills: 0 | Status: CANCELLED | OUTPATIENT
Start: 2024-08-23

## 2024-08-23 RX ORDER — SUCRALFATE ORAL 1 G/10ML
1 SUSPENSION ORAL EVERY 6 HOURS
Qty: 1500 ML | Refills: 0 | Status: CANCELLED | OUTPATIENT
Start: 2024-08-23

## 2024-08-23 RX ORDER — SUCRALFATE ORAL 1 G/10ML
1 SUSPENSION ORAL EVERY 6 HOURS
Qty: 280 ML | Refills: 0 | Status: SHIPPED | OUTPATIENT
Start: 2024-08-23 | End: 2024-08-30

## 2024-08-23 RX ORDER — ONDANSETRON 4 MG/1
4 TABLET, ORALLY DISINTEGRATING ORAL PRN
Qty: 30 TABLET | Refills: 0 | Status: CANCELLED | OUTPATIENT
Start: 2024-08-23

## 2024-08-23 RX ADMIN — SUCRALFATE 1 G: 1 SUSPENSION ORAL at 01:03

## 2024-08-23 RX ADMIN — OXYCODONE HYDROCHLORIDE 10 MG: 10 TABLET ORAL at 01:42

## 2024-08-23 RX ADMIN — SUCRALFATE 1 G: 1 SUSPENSION ORAL at 11:47

## 2024-08-23 RX ADMIN — SERTRALINE HYDROCHLORIDE 25 MG: 50 TABLET ORAL at 05:55

## 2024-08-23 RX ADMIN — SUCRALFATE 1 G: 1 SUSPENSION ORAL at 05:55

## 2024-08-23 RX ADMIN — LORAZEPAM 0.5 MG: 1 TABLET ORAL at 06:00

## 2024-08-23 RX ADMIN — LORAZEPAM 1 MG: 1 TABLET ORAL at 13:10

## 2024-08-23 RX ADMIN — LORAZEPAM 0.5 MG: 1 TABLET ORAL at 08:50

## 2024-08-23 RX ADMIN — OXYCODONE HYDROCHLORIDE 10 MG: 10 TABLET ORAL at 08:51

## 2024-08-23 RX ADMIN — ONDANSETRON 4 MG: 4 TABLET, ORALLY DISINTEGRATING ORAL at 01:03

## 2024-08-23 RX ADMIN — LORAZEPAM 1 MG: 1 TABLET ORAL at 01:03

## 2024-08-23 RX ADMIN — CHLORDIAZEPOXIDE HYDROCHLORIDE 25 MG: 25 CAPSULE ORAL at 05:55

## 2024-08-23 RX ADMIN — OMEPRAZOLE 40 MG: 20 CAPSULE, DELAYED RELEASE ORAL at 05:55

## 2024-08-23 ASSESSMENT — LIFESTYLE VARIABLES
TREMOR: NO TREMOR
TOTAL SCORE: 5
AGITATION: SOMEWHAT MORE THAN NORMAL ACTIVITY
AGITATION: SOMEWHAT MORE THAN NORMAL ACTIVITY
ORIENTATION AND CLOUDING OF SENSORIUM: ORIENTED AND CAN DO SERIAL ADDITIONS
TOTAL SCORE: 9
PAROXYSMAL SWEATS: NO SWEAT VISIBLE
ANXIETY: *
PAROXYSMAL SWEATS: NO SWEAT VISIBLE
AUDITORY DISTURBANCES: NOT PRESENT
VISUAL DISTURBANCES: NOT PRESENT
ANXIETY: *
HEADACHE, FULLNESS IN HEAD: NOT PRESENT
ORIENTATION AND CLOUDING OF SENSORIUM: ORIENTED AND CAN DO SERIAL ADDITIONS
NAUSEA AND VOMITING: NO NAUSEA AND NO VOMITING
NAUSEA AND VOMITING: NO NAUSEA AND NO VOMITING
TOTAL SCORE: 9
AUDITORY DISTURBANCES: NOT PRESENT
VISUAL DISTURBANCES: VERY MILD SENSITIVITY
TOTAL SCORE: 6
ORIENTATION AND CLOUDING OF SENSORIUM: ORIENTED AND CAN DO SERIAL ADDITIONS
AUDITORY DISTURBANCES: NOT PRESENT
ANXIETY: *
PAROXYSMAL SWEATS: NO SWEAT VISIBLE
AGITATION: SOMEWHAT MORE THAN NORMAL ACTIVITY
HEADACHE, FULLNESS IN HEAD: MODERATELY SEVERE
VISUAL DISTURBANCES: NOT PRESENT
HEADACHE, FULLNESS IN HEAD: VERY MILD
VISUAL DISTURBANCES: VERY MILD SENSITIVITY
TREMOR: NO TREMOR
TREMOR: NO TREMOR
NAUSEA AND VOMITING: NO NAUSEA AND NO VOMITING
HEADACHE, FULLNESS IN HEAD: MODERATE
AUDITORY DISTURBANCES: NOT PRESENT
ORIENTATION AND CLOUDING OF SENSORIUM: ORIENTED AND CAN DO SERIAL ADDITIONS
NAUSEA AND VOMITING: NO NAUSEA AND NO VOMITING
ANXIETY: *
AGITATION: *
PAROXYSMAL SWEATS: NO SWEAT VISIBLE
TREMOR: NO TREMOR

## 2024-08-23 ASSESSMENT — PAIN DESCRIPTION - PAIN TYPE
TYPE: ACUTE PAIN
TYPE: ACUTE PAIN

## 2024-08-23 ASSESSMENT — FIBROSIS 4 INDEX: FIB4 SCORE: 0.66

## 2024-08-23 NOTE — DISCHARGE INSTRUCTIONS
You were admitted for nausea, vomiting and throat pain, in setting of alcohol and  polysubstance use and also for alcohol withdrawal symptoms.  CT was done, shown no evidence of perforation or leak, treated with fluids, IV Protonix, GI cocktail and Zofran for throat pain and Charity Pope syndrome.   Also developed electrolyte abnormalities low potassium, which was repleted.  Recommend taking medications as prescribed and follow-up with your PCP in a week.  Strongly recommend discontinuing use of alcohol and other substances.  Referral sent for outpatient psychiatry for anxiety and mental health

## 2024-08-23 NOTE — PROGRESS NOTES
DC instructions reviewed with pt. Medications provided to pt from meds to beds. PIV removed on floor. Pt agreeable to DC plan & all questions answered.

## 2024-08-23 NOTE — CARE PLAN
The patient is Stable - Low risk of patient condition declining or worsening    Shift Goals  Clinical Goals: CIWA, safety  Patient Goals: Sleep  Family Goals: Updates    Progress made toward(s) clinical / shift goals:      Problem: Pain - Standard  Goal: Alleviation of pain or a reduction in pain to the patient’s comfort goal  Description: Target End Date:  Prior to discharge or change in level of care    Document on Vitals flowsheet    1.  Document pain using the appropriate pain scale per order or unit policy  2.  Educate and implement non-pharmacologic comfort measures (i.e. relaxation, distraction, massage, cold/heat therapy, etc.)  3.  Pain management medications as ordered  4.  Reassess pain after pain med administration per policy  5.  If opiods administered assess patient's response to pain medication is appropriate per POSS sedation scale  6.  Follow pain management plan developed in collaboration with patient and interdisciplinary team (including palliative care or pain specialists if applicable)  Outcome: Progressing     Problem: Optimal Care for Alcohol Withdrawal  Goal: Optimal Care for the alcohol withdrawal patient  Description: Target End Date:  1 to 3 days    1.  Alcohol history screening done on admission  2.  CIWA-AR score assessment (includes assessment of nausea/vomiting, tremor, sweats, anxiety, agitation, tactile, visual and auditory disturbances, headache and orientation/sensorium).  Document on CIWA flowsheet.  3.  Follow CIWA-AR score protocol  4.  Frequent reorientation  5.  Monitor for fluid and electrolyte imbalance.  6.  Assess for respiratory depression due to sedation (pulse ox)  7.  Consider thiamine, multivitamins, folic acid and magnesium sulfate per provider order  8.  Collaborate with Social Workers/Case Management  9.  Collaborate with mental health  Outcome: Progressing

## 2024-08-23 NOTE — HOSPITAL COURSE
"Praful Carmen is a 24 y.o. male with history of ongoing alcohol abuse, seizure, esophageal perforation secondary to vomiting treated conservatively in July 2024 who presented 8/19/2024 with complaints of intractable nausea and vomiting since 11 PM last night. Patient states he was previously sober for the last 3 months but had a relapse on Sunday as that was the anniversary of his sister's death. From there he drank an un-quantifiable amount of alcohol stating it was \"non stop\". His last known drink was 10 pm on Sunday. After the vomiting remained persistent for hours he came to the ED around 3 am for concerns he may have ruptured his esophagus again.     Upon presentation he is tachycardic with CIWA score 14.    Lactic acid 8.4, WBC 18.6, potassium 3.4, glucose 153, CO2 16, anion gap 30, diagnostic alcohol level 14.9, lipase is not elevated.  Chest X-ray normal, no signs of pneumomediastinum, CT abdomen demonstrated hepatic steatosis but no acute process.  He received 4 mg of Ativan IV, 2 L of Ringer lactate, thiamine supplementation and IV protonix for throat pain.  CT abdomen showed hepatic steatosis but no acute process.    CIWA scores improved with librium taper. Patient is not in any distress today and is discharged  in a stable condition.    Referred to outpatient psychiatry for his anxiety and mental health.  "

## 2024-08-23 NOTE — CARE PLAN
Problem: Pain - Standard  Goal: Alleviation of pain or a reduction in pain to the patient’s comfort goal  Outcome: Progressing     Per patient, the pain to the throat and abdomen is improving, will continue to monitor and medicate per MAR.     Problem: Nutrition  Goal: Patient's nutritional and fluid intake will be adequate or improve  Outcome: Progressing   Patient diet was advanced, and is currently tolerating his diet, will continue to assess.     Problem: Gastrointestinal Irritability  Goal: Nausea and vomiting will be absent or improve  Outcome: Progressing   Per patient, there is no feeling of nausea and is able to tolerate diet at this time.     The patient is Stable - Low risk of patient condition declining or worsening    Shift Goals  Clinical Goals: CIWA, Safety  Patient Goals: Wants to go home  Family Goals: POC    Progress made toward(s) clinical / shift goals:      Patient is not progressing towards the following goals:

## 2024-08-23 NOTE — PROGRESS NOTES
"/60   Pulse 72   Temp 36.4 °C (97.5 °F) (Temporal)   Resp 16   Ht 1.702 m (5' 7\")   Wt 67.2 kg (148 lb 2.4 oz)   SpO2 96%        Patient AAO*4, was medicated prior for pain, per patient, pain is improving.     Patient's CIWA score at 1200, was 9 and was medicated per MAR.      @ 1523, patient was escorted off the unit via a WC to the D/C lounge.   "

## 2024-08-23 NOTE — HOSPITAL COURSE
"Praful Carmen is a 24 y.o. male with history of ongoing alcohol abuse, seizure, esophageal perforation secondary to vomiting treated conservatively in July 2024 who presented 8/19/2024 with complaints of intractable nausea and vomiting since 11 PM last night. Patient states he was previously sober for the last 3 months but had a relapse on Sunday as that was the anniversary of his sister's death. From there he drank an un-quantifiable amount of alcohol stating it was \"non stop\". His last known drink was 10 pm on Sunday. After the vomiting remained persistent for hours he came to the ED around 3 am for concerns he may have ruptured his esophagus again.   "

## 2024-08-23 NOTE — DISCHARGE SUMMARY
"[facility] Internal Medicine Discharge Summary    Attending: Dr. Gail Jackson  Senior Resident: Dr. Vanesa Horta  Intern:  Dr. Shanel Marrero  Contact Number: [phone]    CHIEF COMPLAINT ON ADMISSION  Chief Complaint   Patient presents with    N/V     Pt states he feels he is withdrawing from alcohol his last drink was at 2200 last night, he has been vomiting since 2300 last night. Pt reports he has had seizures after withdrawing in the past.        Reason for Admission  Vomitting; Sore Throat     Admission Date  8/19/2024    CODE STATUS  Full Code    HPI & HOSPITAL COURSE  Praful Carmen is a 24 y.o. male with history of ongoing alcohol abuse, seizure, esophageal perforation secondary to vomiting treated conservatively in July 2024 who presented 8/19/2024 with complaints of intractable nausea and vomiting since 11 PM last night. Patient states he was previously sober for the last 3 months but had a relapse on Sunday as that was the anniversary of his sister's death. From there he drank an un-quantifiable amount of alcohol stating it was \"non stop\". His last known drink was 10 pm on Sunday. After the vomiting remained persistent for hours he came to the ED around 3 am for concerns he may have ruptured his esophagus again.     While in the floors he was managed on CIWA protocol with a Librium taper due to initial elevated CIWA scores of 2. There were no acute concerns of GI bleed however he continued to remain on IV PPIs throughout this admission. CIWA scores eventually down trended with decreased Librium and Ativan needs. Patient however has a baseline history of anxiety which he states that his home regiment of Zoloft was ineffective.  He will likely need outpatient follow-up with mental health upon discharge.    On 8/23/2024 CIWA scores normalized, patient did not require any Ativan, and Librium was discontinued.  He was safe for disposition to follow-up with his PCP for resolution of symptoms as well as a " referral for behavioral health placed for ongoing management of his mental health.    Therefore, he is discharged in good and stable condition to home with close outpatient follow-up.    The patient met 2-midnight criteria for an inpatient stay at the time of discharge.    Discharge Date  8/23/2024    Physical Exam on Day of Discharge  Physical Exam  Vitals reviewed.   Constitutional:       Appearance: He is not ill-appearing.   HENT:      Right Ear: No decreased hearing noted.      Left Ear: No decreased hearing noted.      Nose: No congestion.      Mouth/Throat:      Mouth: Mucous membranes are moist.   Eyes:      Pupils: Pupils are equal, round, and reactive to light.      Comments: Reports white spots in his vision bilaterally   Cardiovascular:      Rate and Rhythm: Normal rate and regular rhythm.      Pulses: Normal pulses.      Heart sounds: Normal heart sounds.   Pulmonary:      Effort: Pulmonary effort is normal. No respiratory distress.      Breath sounds: Normal breath sounds.   Chest:      Chest wall: No tenderness.   Abdominal:      General: Abdomen is flat.      Tenderness: There is no abdominal tenderness.   Musculoskeletal:         General: No swelling or tenderness. Normal range of motion.      Cervical back: Normal range of motion.      Right lower leg: No edema.      Left lower leg: No edema.   Skin:     General: Skin is warm.      Capillary Refill: Capillary refill takes less than 2 seconds.   Neurological:      General: No focal deficit present.      Mental Status: He is alert and oriented to person, place, and time.      Gait: Gait normal.   Psychiatric:         Mood and Affect: Mood normal.         FOLLOW UP ITEMS POST DISCHARGE  Recommend PCP follow-up for recent hospitalization  Referred to psychiatry outpatient for mental health and anxiety issues.  Recommend taking medications as needed and strongly advise to stop drinking and use of other substances.    DISCHARGE DIAGNOSES  Principal  Problem:    Alcohol withdrawal (HCC) (POA: Yes)  Active Problems:    Alcohol withdrawal syndrome, uncomplicated (HCC) (POA: Yes)    Charity-Pope syndrome (POA: Unknown)    Drug abuse (HCC) (POA: Unknown)    Mood disorder (HCC) (POA: Unknown)  Resolved Problems:    SIRS (systemic inflammatory response syndrome) (HCC) (POA: Yes)    Lactic acid acidosis (POA: Yes)    Hypokalemia (POA: Unknown)    Abnormal CPK (POA: Unknown)      FOLLOW UP  No future appointments.  [facility], Emergency Dept  [address] [address] [phone]  Follow up  As needed      MEDICATIONS ON DISCHARGE     Medication List        START taking these medications        Instructions   Carafate 1 GM/10ML Susp  Generic drug: sucralfate   Take 10 mL by mouth every 6 hours for 7 days.  Dose: 1 g     omeprazole 40 MG delayed-release capsule  Commonly known as: PriLOSEC   Take 1 Capsule by mouth every day for 30 days.  Dose: 40 mg     ondansetron 4 MG Tbdp  Commonly known as: Zofran ODT   Take 1 Tablet by mouth as needed for Nausea/Vomiting.  Dose: 4 mg            CONTINUE taking these medications        Instructions   acetaminophen 160 MG/5ML liquid  Commonly known as: Tylenol   Take 20 mL by mouth every 6 hours as needed for Mild Pain.  Dose: 20 mL     gabapentin 250 MG/5ML solution  Commonly known as: Neurontin   Take 100 mg by mouth 3 times a day as needed (anxiety, sleep, withdraw, cravings).  Dose: 100 mg     sertraline 25 MG tablet  Commonly known as: Zoloft   Take 25 mg by mouth every day.  Dose: 25 mg            STOP taking these medications      amoxicillin-clavulanate 400-57 MG/5ML Susr suspension  Commonly known as: Augmentin     fluconazole 40 MG/ML suspension  Commonly known as: Diflucan     naltrexone 50 MG Tabs  Commonly known as: Depade              Allergies  No Known Allergies    DIET  Orders Placed This Encounter   Procedures    Diet Order Diet: Low Fiber(GI Soft); Miscellaneous modifications:  (optional): 6 Small Meals     Standing Status:   Standing     Number of Occurrences:   1     Order Specific Question:   Diet:     Answer:   Low Fiber(GI Soft) [2]     Order Specific Question:   Miscellaneous modifications: (optional)     Answer:   6 Small Meals [4]       ACTIVITY  As tolerated.  Weight bearing as tolerated    CONSULTATIONS  none    PROCEDURES  none    LABORATORY  Lab Results   Component Value Date    SODIUM 137 08/21/2024    POTASSIUM 3.6 08/21/2024    CHLORIDE 102 08/21/2024    CO2 21 08/21/2024    GLUCOSE 85 08/21/2024    BUN 8 08/21/2024    CREATININE 0.66 08/21/2024        Lab Results   Component Value Date    WBC 8.4 08/21/2024    HEMOGLOBIN 16.2 08/21/2024    HEMATOCRIT 46.8 08/21/2024    PLATELETCT 212 08/21/2024        Total time of the discharge process exceeds 30 minutes.

## 2024-10-28 ENCOUNTER — HOSPITAL ENCOUNTER (EMERGENCY)
Facility: MEDICAL CENTER | Age: 24
End: 2024-10-29
Attending: STUDENT IN AN ORGANIZED HEALTH CARE EDUCATION/TRAINING PROGRAM
Payer: COMMERCIAL

## 2024-10-28 DIAGNOSIS — E86.0 DEHYDRATION: ICD-10-CM

## 2024-10-28 DIAGNOSIS — F10.920 ALCOHOLIC INTOXICATION WITHOUT COMPLICATION (HCC): ICD-10-CM

## 2024-10-28 DIAGNOSIS — R11.2 NAUSEA AND VOMITING, UNSPECIFIED VOMITING TYPE: ICD-10-CM

## 2024-10-28 DIAGNOSIS — E87.6 HYPOKALEMIA: ICD-10-CM

## 2024-10-28 LAB
ALBUMIN SERPL BCP-MCNC: 4.2 G/DL (ref 3.2–4.9)
ALBUMIN/GLOB SERPL: 1.7 G/DL
ALP SERPL-CCNC: 82 U/L (ref 30–99)
ALT SERPL-CCNC: 52 U/L (ref 2–50)
ANION GAP SERPL CALC-SCNC: 22 MMOL/L (ref 7–16)
AST SERPL-CCNC: 49 U/L (ref 12–45)
BASOPHILS # BLD AUTO: 0.5 % (ref 0–1.8)
BASOPHILS # BLD: 0.04 K/UL (ref 0–0.12)
BILIRUB SERPL-MCNC: 0.4 MG/DL (ref 0.1–1.5)
BUN SERPL-MCNC: 10 MG/DL (ref 8–22)
CALCIUM ALBUM COR SERPL-MCNC: 9.1 MG/DL (ref 8.5–10.5)
CALCIUM SERPL-MCNC: 9.3 MG/DL (ref 8.5–10.5)
CHLORIDE SERPL-SCNC: 103 MMOL/L (ref 96–112)
CO2 SERPL-SCNC: 19 MMOL/L (ref 20–33)
CREAT SERPL-MCNC: 0.58 MG/DL (ref 0.5–1.4)
EOSINOPHIL # BLD AUTO: 0.34 K/UL (ref 0–0.51)
EOSINOPHIL NFR BLD: 4.5 % (ref 0–6.9)
ERYTHROCYTE [DISTWIDTH] IN BLOOD BY AUTOMATED COUNT: 37.3 FL (ref 35.9–50)
ETHANOL BLD-MCNC: 236.9 MG/DL
GFR SERPLBLD CREATININE-BSD FMLA CKD-EPI: 138 ML/MIN/1.73 M 2
GLOBULIN SER CALC-MCNC: 2.5 G/DL (ref 1.9–3.5)
GLUCOSE SERPL-MCNC: 108 MG/DL (ref 65–99)
HCT VFR BLD AUTO: 42.9 % (ref 42–52)
HGB BLD-MCNC: 15.3 G/DL (ref 14–18)
IMM GRANULOCYTES # BLD AUTO: 0.01 K/UL (ref 0–0.11)
IMM GRANULOCYTES NFR BLD AUTO: 0.1 % (ref 0–0.9)
LIPASE SERPL-CCNC: 29 U/L (ref 11–82)
LYMPHOCYTES # BLD AUTO: 3.72 K/UL (ref 1–4.8)
LYMPHOCYTES NFR BLD: 49.2 % (ref 22–41)
MCH RBC QN AUTO: 29.3 PG (ref 27–33)
MCHC RBC AUTO-ENTMCNC: 35.7 G/DL (ref 32.3–36.5)
MCV RBC AUTO: 82.2 FL (ref 81.4–97.8)
MONOCYTES # BLD AUTO: 0.5 K/UL (ref 0–0.85)
MONOCYTES NFR BLD AUTO: 6.6 % (ref 0–13.4)
NEUTROPHILS # BLD AUTO: 2.95 K/UL (ref 1.82–7.42)
NEUTROPHILS NFR BLD: 39.1 % (ref 44–72)
NRBC # BLD AUTO: 0 K/UL
NRBC BLD-RTO: 0 /100 WBC (ref 0–0.2)
PLATELET # BLD AUTO: 390 K/UL (ref 164–446)
PMV BLD AUTO: 9.5 FL (ref 9–12.9)
POTASSIUM SERPL-SCNC: 3.1 MMOL/L (ref 3.6–5.5)
PROT SERPL-MCNC: 6.7 G/DL (ref 6–8.2)
RBC # BLD AUTO: 5.22 M/UL (ref 4.7–6.1)
SODIUM SERPL-SCNC: 144 MMOL/L (ref 135–145)
WBC # BLD AUTO: 7.6 K/UL (ref 4.8–10.8)

## 2024-10-28 PROCEDURE — 85025 COMPLETE CBC W/AUTO DIFF WBC: CPT

## 2024-10-28 PROCEDURE — 96375 TX/PRO/DX INJ NEW DRUG ADDON: CPT

## 2024-10-28 PROCEDURE — 80053 COMPREHEN METABOLIC PANEL: CPT

## 2024-10-28 PROCEDURE — 82077 ASSAY SPEC XCP UR&BREATH IA: CPT

## 2024-10-28 PROCEDURE — 99285 EMERGENCY DEPT VISIT HI MDM: CPT

## 2024-10-28 PROCEDURE — 700111 HCHG RX REV CODE 636 W/ 250 OVERRIDE (IP): Mod: JZ | Performed by: STUDENT IN AN ORGANIZED HEALTH CARE EDUCATION/TRAINING PROGRAM

## 2024-10-28 PROCEDURE — 51798 US URINE CAPACITY MEASURE: CPT

## 2024-10-28 PROCEDURE — 96374 THER/PROPH/DIAG INJ IV PUSH: CPT

## 2024-10-28 PROCEDURE — 83690 ASSAY OF LIPASE: CPT

## 2024-10-28 PROCEDURE — 36415 COLL VENOUS BLD VENIPUNCTURE: CPT

## 2024-10-28 RX ORDER — LORAZEPAM 2 MG/ML
1 INJECTION INTRAMUSCULAR ONCE
Status: COMPLETED | OUTPATIENT
Start: 2024-10-28 | End: 2024-10-28

## 2024-10-28 RX ORDER — HALOPERIDOL 5 MG/ML
5 INJECTION INTRAMUSCULAR ONCE
Status: COMPLETED | OUTPATIENT
Start: 2024-10-28 | End: 2024-10-28

## 2024-10-28 RX ORDER — DIPHENHYDRAMINE HYDROCHLORIDE 50 MG/ML
25 INJECTION INTRAMUSCULAR; INTRAVENOUS ONCE
Status: COMPLETED | OUTPATIENT
Start: 2024-10-28 | End: 2024-10-28

## 2024-10-28 RX ADMIN — LORAZEPAM 1 MG: 2 INJECTION INTRAMUSCULAR; INTRAVENOUS at 22:45

## 2024-10-28 RX ADMIN — DIPHENHYDRAMINE HYDROCHLORIDE 25 MG: 50 INJECTION, SOLUTION INTRAMUSCULAR; INTRAVENOUS at 22:05

## 2024-10-28 RX ADMIN — HALOPERIDOL LACTATE 5 MG: 5 INJECTION, SOLUTION INTRAMUSCULAR at 22:07

## 2024-10-28 ASSESSMENT — LIFESTYLE VARIABLES
PAROXYSMAL SWEATS: NO SWEAT VISIBLE
ANXIETY: MILDLY ANXIOUS
TOTAL SCORE: 12
NAUSEA AND VOMITING: *
VISUAL DISTURBANCES: NOT PRESENT
AGITATION: *
AUDITORY DISTURBANCES: NOT PRESENT
TREMOR: *
ORIENTATION AND CLOUDING OF SENSORIUM: CANNOT DO SERIAL ADDITIONS OR IS UNCERTAIN ABOUT DATE
HEADACHE, FULLNESS IN HEAD: MODERATE

## 2024-10-28 ASSESSMENT — FIBROSIS 4 INDEX: FIB4 SCORE: 0.28

## 2024-10-29 VITALS
HEIGHT: 67 IN | SYSTOLIC BLOOD PRESSURE: 103 MMHG | OXYGEN SATURATION: 94 % | WEIGHT: 149.91 LBS | TEMPERATURE: 97.8 F | DIASTOLIC BLOOD PRESSURE: 58 MMHG | RESPIRATION RATE: 18 BRPM | BODY MASS INDEX: 23.53 KG/M2 | HEART RATE: 85 BPM

## 2024-10-29 PROCEDURE — 700102 HCHG RX REV CODE 250 W/ 637 OVERRIDE(OP): Performed by: EMERGENCY MEDICINE

## 2024-10-29 PROCEDURE — A9270 NON-COVERED ITEM OR SERVICE: HCPCS | Performed by: EMERGENCY MEDICINE

## 2024-10-29 RX ORDER — ACETAMINOPHEN 500 MG
1000 TABLET ORAL ONCE
Status: COMPLETED | OUTPATIENT
Start: 2024-10-29 | End: 2024-10-29

## 2024-10-29 RX ORDER — IBUPROFEN 600 MG/1
600 TABLET, FILM COATED ORAL ONCE
Status: COMPLETED | OUTPATIENT
Start: 2024-10-29 | End: 2024-10-29

## 2024-10-29 RX ORDER — POTASSIUM CHLORIDE 1.5 G/1.58G
20 POWDER, FOR SOLUTION ORAL 2 TIMES DAILY
Qty: 14 PACKET | Refills: 0 | Status: SHIPPED | OUTPATIENT
Start: 2024-10-29 | End: 2024-11-12

## 2024-10-29 RX ADMIN — IBUPROFEN 600 MG: 600 TABLET, FILM COATED ORAL at 02:42

## 2024-10-29 RX ADMIN — ACETAMINOPHEN 1000 MG: 500 TABLET ORAL at 02:41

## 2025-05-01 ENCOUNTER — HOSPITAL ENCOUNTER (INPATIENT)
Facility: MEDICAL CENTER | Age: 25
LOS: 1 days | DRG: 897 | End: 2025-05-02
Attending: EMERGENCY MEDICINE | Admitting: HOSPITALIST

## 2025-05-01 DIAGNOSIS — F10.132: ICD-10-CM

## 2025-05-01 DIAGNOSIS — F10.939 ALCOHOL WITHDRAWAL SYNDROME WITH COMPLICATION (HCC): ICD-10-CM

## 2025-05-01 DIAGNOSIS — E87.29 HIGH ANION GAP METABOLIC ACIDOSIS: ICD-10-CM

## 2025-05-01 DIAGNOSIS — E86.0 DEHYDRATION: ICD-10-CM

## 2025-05-01 PROBLEM — R44.3 HALLUCINATIONS: Status: ACTIVE | Noted: 2025-05-01

## 2025-05-01 LAB
ALBUMIN SERPL BCP-MCNC: 5 G/DL (ref 3.2–4.9)
ALBUMIN/GLOB SERPL: 1.4 G/DL
ALP SERPL-CCNC: 121 U/L (ref 30–99)
ALT SERPL-CCNC: 22 U/L (ref 2–50)
ANION GAP SERPL CALC-SCNC: 24 MMOL/L (ref 7–16)
AST SERPL-CCNC: 39 U/L (ref 12–45)
BASOPHILS # BLD AUTO: 0.5 % (ref 0–1.8)
BASOPHILS # BLD: 0.05 K/UL (ref 0–0.12)
BILIRUB SERPL-MCNC: 0.5 MG/DL (ref 0.1–1.5)
BUN SERPL-MCNC: 8 MG/DL (ref 8–22)
CALCIUM ALBUM COR SERPL-MCNC: 8.5 MG/DL (ref 8.5–10.5)
CALCIUM SERPL-MCNC: 9.3 MG/DL (ref 8.5–10.5)
CHLORIDE SERPL-SCNC: 101 MMOL/L (ref 96–112)
CO2 SERPL-SCNC: 16 MMOL/L (ref 20–33)
CREAT SERPL-MCNC: 0.97 MG/DL (ref 0.5–1.4)
EOSINOPHIL # BLD AUTO: 0.04 K/UL (ref 0–0.51)
EOSINOPHIL NFR BLD: 0.4 % (ref 0–6.9)
ERYTHROCYTE [DISTWIDTH] IN BLOOD BY AUTOMATED COUNT: 39 FL (ref 35.9–50)
ETHANOL BLD-MCNC: 226 MG/DL
GFR SERPLBLD CREATININE-BSD FMLA CKD-EPI: 110 ML/MIN/1.73 M 2
GLOBULIN SER CALC-MCNC: 3.6 G/DL (ref 1.9–3.5)
GLUCOSE SERPL-MCNC: 124 MG/DL (ref 65–99)
HCT VFR BLD AUTO: 53.5 % (ref 42–52)
HGB BLD-MCNC: 18.5 G/DL (ref 14–18)
IMM GRANULOCYTES # BLD AUTO: 0.03 K/UL (ref 0–0.11)
IMM GRANULOCYTES NFR BLD AUTO: 0.3 % (ref 0–0.9)
LIPASE SERPL-CCNC: 32 U/L (ref 11–82)
LYMPHOCYTES # BLD AUTO: 3.23 K/UL (ref 1–4.8)
LYMPHOCYTES NFR BLD: 29.2 % (ref 22–41)
MAGNESIUM SERPL-MCNC: 1.9 MG/DL (ref 1.5–2.5)
MCH RBC QN AUTO: 27.9 PG (ref 27–33)
MCHC RBC AUTO-ENTMCNC: 34.6 G/DL (ref 32.3–36.5)
MCV RBC AUTO: 80.8 FL (ref 81.4–97.8)
MONOCYTES # BLD AUTO: 0.4 K/UL (ref 0–0.85)
MONOCYTES NFR BLD AUTO: 3.6 % (ref 0–13.4)
NEUTROPHILS # BLD AUTO: 7.33 K/UL (ref 1.82–7.42)
NEUTROPHILS NFR BLD: 66 % (ref 44–72)
NRBC # BLD AUTO: 0 K/UL
NRBC BLD-RTO: 0 /100 WBC (ref 0–0.2)
PLATELET # BLD AUTO: 488 K/UL (ref 164–446)
PMV BLD AUTO: 9.6 FL (ref 9–12.9)
POTASSIUM SERPL-SCNC: 4 MMOL/L (ref 3.6–5.5)
PROT SERPL-MCNC: 8.6 G/DL (ref 6–8.2)
RBC # BLD AUTO: 6.62 M/UL (ref 4.7–6.1)
SODIUM SERPL-SCNC: 141 MMOL/L (ref 135–145)
WBC # BLD AUTO: 11.1 K/UL (ref 4.8–10.8)

## 2025-05-01 PROCEDURE — 36415 COLL VENOUS BLD VENIPUNCTURE: CPT

## 2025-05-01 PROCEDURE — 700105 HCHG RX REV CODE 258: Performed by: HOSPITALIST

## 2025-05-01 PROCEDURE — 80053 COMPREHEN METABOLIC PANEL: CPT

## 2025-05-01 PROCEDURE — A9270 NON-COVERED ITEM OR SERVICE: HCPCS | Performed by: EMERGENCY MEDICINE

## 2025-05-01 PROCEDURE — 83735 ASSAY OF MAGNESIUM: CPT

## 2025-05-01 PROCEDURE — 96361 HYDRATE IV INFUSION ADD-ON: CPT

## 2025-05-01 PROCEDURE — 99285 EMERGENCY DEPT VISIT HI MDM: CPT

## 2025-05-01 PROCEDURE — 83690 ASSAY OF LIPASE: CPT

## 2025-05-01 PROCEDURE — 96374 THER/PROPH/DIAG INJ IV PUSH: CPT

## 2025-05-01 PROCEDURE — HZ2ZZZZ DETOXIFICATION SERVICES FOR SUBSTANCE ABUSE TREATMENT: ICD-10-PCS | Performed by: HOSPITALIST

## 2025-05-01 PROCEDURE — 99223 1ST HOSP IP/OBS HIGH 75: CPT | Performed by: HOSPITALIST

## 2025-05-01 PROCEDURE — 700105 HCHG RX REV CODE 258: Performed by: EMERGENCY MEDICINE

## 2025-05-01 PROCEDURE — 82077 ASSAY SPEC XCP UR&BREATH IA: CPT

## 2025-05-01 PROCEDURE — 700102 HCHG RX REV CODE 250 W/ 637 OVERRIDE(OP): Performed by: EMERGENCY MEDICINE

## 2025-05-01 PROCEDURE — 96375 TX/PRO/DX INJ NEW DRUG ADDON: CPT

## 2025-05-01 PROCEDURE — 770006 HCHG ROOM/CARE - MED/SURG/GYN SEMI*

## 2025-05-01 PROCEDURE — 700111 HCHG RX REV CODE 636 W/ 250 OVERRIDE (IP): Mod: JZ | Performed by: EMERGENCY MEDICINE

## 2025-05-01 PROCEDURE — 700102 HCHG RX REV CODE 250 W/ 637 OVERRIDE(OP): Performed by: HOSPITALIST

## 2025-05-01 PROCEDURE — 85025 COMPLETE CBC W/AUTO DIFF WBC: CPT

## 2025-05-01 PROCEDURE — A9270 NON-COVERED ITEM OR SERVICE: HCPCS | Performed by: HOSPITALIST

## 2025-05-01 RX ORDER — LABETALOL HYDROCHLORIDE 5 MG/ML
10 INJECTION, SOLUTION INTRAVENOUS EVERY 4 HOURS PRN
Status: DISCONTINUED | OUTPATIENT
Start: 2025-05-01 | End: 2025-05-02 | Stop reason: HOSPADM

## 2025-05-01 RX ORDER — HALOPERIDOL 5 MG/ML
1 INJECTION INTRAMUSCULAR EVERY 4 HOURS PRN
Status: DISCONTINUED | OUTPATIENT
Start: 2025-05-01 | End: 2025-05-02 | Stop reason: HOSPADM

## 2025-05-01 RX ORDER — CHLORDIAZEPOXIDE HYDROCHLORIDE 25 MG/1
25 CAPSULE, GELATIN COATED ORAL EVERY 6 HOURS
Status: DISCONTINUED | OUTPATIENT
Start: 2025-05-02 | End: 2025-05-02 | Stop reason: HOSPADM

## 2025-05-01 RX ORDER — FOLIC ACID 1 MG/1
1 TABLET ORAL DAILY
Status: DISCONTINUED | OUTPATIENT
Start: 2025-05-02 | End: 2025-05-02 | Stop reason: HOSPADM

## 2025-05-01 RX ORDER — MIDAZOLAM HYDROCHLORIDE 1 MG/ML
5 INJECTION INTRAMUSCULAR; INTRAVENOUS ONCE
Status: COMPLETED | OUTPATIENT
Start: 2025-05-01 | End: 2025-05-01

## 2025-05-01 RX ORDER — PROMETHAZINE HYDROCHLORIDE 25 MG/1
12.5-25 TABLET ORAL EVERY 4 HOURS PRN
Status: DISCONTINUED | OUTPATIENT
Start: 2025-05-01 | End: 2025-05-02 | Stop reason: HOSPADM

## 2025-05-01 RX ORDER — ONDANSETRON 4 MG/1
4 TABLET, ORALLY DISINTEGRATING ORAL EVERY 4 HOURS PRN
Status: DISCONTINUED | OUTPATIENT
Start: 2025-05-01 | End: 2025-05-02 | Stop reason: HOSPADM

## 2025-05-01 RX ORDER — DIAZEPAM 10 MG/2ML
10 INJECTION, SOLUTION INTRAMUSCULAR; INTRAVENOUS
Status: DISCONTINUED | OUTPATIENT
Start: 2025-05-01 | End: 2025-05-02 | Stop reason: HOSPADM

## 2025-05-01 RX ORDER — SODIUM CHLORIDE, SODIUM LACTATE, POTASSIUM CHLORIDE, CALCIUM CHLORIDE 600; 310; 30; 20 MG/100ML; MG/100ML; MG/100ML; MG/100ML
INJECTION, SOLUTION INTRAVENOUS CONTINUOUS
Status: DISCONTINUED | OUTPATIENT
Start: 2025-05-01 | End: 2025-05-02 | Stop reason: HOSPADM

## 2025-05-01 RX ORDER — CHLORDIAZEPOXIDE HYDROCHLORIDE 25 MG/1
50 CAPSULE, GELATIN COATED ORAL EVERY 6 HOURS
Status: DISCONTINUED | OUTPATIENT
Start: 2025-05-01 | End: 2025-05-02 | Stop reason: HOSPADM

## 2025-05-01 RX ORDER — LORAZEPAM 1 MG/1
2 TABLET ORAL
Status: DISCONTINUED | OUTPATIENT
Start: 2025-05-01 | End: 2025-05-02 | Stop reason: HOSPADM

## 2025-05-01 RX ORDER — ONDANSETRON 2 MG/ML
4 INJECTION INTRAMUSCULAR; INTRAVENOUS ONCE
Status: COMPLETED | OUTPATIENT
Start: 2025-05-01 | End: 2025-05-01

## 2025-05-01 RX ORDER — LORAZEPAM 1 MG/1
4 TABLET ORAL
Status: DISCONTINUED | OUTPATIENT
Start: 2025-05-01 | End: 2025-05-02 | Stop reason: HOSPADM

## 2025-05-01 RX ORDER — ACETAMINOPHEN 325 MG/1
650 TABLET ORAL EVERY 6 HOURS PRN
Status: DISCONTINUED | OUTPATIENT
Start: 2025-05-01 | End: 2025-05-02 | Stop reason: HOSPADM

## 2025-05-01 RX ORDER — LORAZEPAM 0.5 MG/1
0.5 TABLET ORAL EVERY 4 HOURS PRN
Status: DISCONTINUED | OUTPATIENT
Start: 2025-05-01 | End: 2025-05-02 | Stop reason: HOSPADM

## 2025-05-01 RX ORDER — PROMETHAZINE HYDROCHLORIDE 12.5 MG/1
12.5-25 SUPPOSITORY RECTAL EVERY 4 HOURS PRN
Status: DISCONTINUED | OUTPATIENT
Start: 2025-05-01 | End: 2025-05-02 | Stop reason: HOSPADM

## 2025-05-01 RX ORDER — DIPHENHYDRAMINE HYDROCHLORIDE 50 MG/ML
50 INJECTION, SOLUTION INTRAMUSCULAR; INTRAVENOUS ONCE
Status: COMPLETED | OUTPATIENT
Start: 2025-05-01 | End: 2025-05-01

## 2025-05-01 RX ORDER — HALOPERIDOL 5 MG/ML
5 INJECTION INTRAMUSCULAR ONCE
Status: COMPLETED | OUTPATIENT
Start: 2025-05-01 | End: 2025-05-01

## 2025-05-01 RX ORDER — SODIUM CHLORIDE, SODIUM LACTATE, POTASSIUM CHLORIDE, AND CALCIUM CHLORIDE .6; .31; .03; .02 G/100ML; G/100ML; G/100ML; G/100ML
2000 INJECTION, SOLUTION INTRAVENOUS ONCE
Status: COMPLETED | OUTPATIENT
Start: 2025-05-01 | End: 2025-05-01

## 2025-05-01 RX ORDER — LORAZEPAM 1 MG/1
3 TABLET ORAL
Status: DISCONTINUED | OUTPATIENT
Start: 2025-05-01 | End: 2025-05-02 | Stop reason: HOSPADM

## 2025-05-01 RX ORDER — LORAZEPAM 1 MG/1
1 TABLET ORAL EVERY 4 HOURS PRN
Status: DISCONTINUED | OUTPATIENT
Start: 2025-05-01 | End: 2025-05-02 | Stop reason: HOSPADM

## 2025-05-01 RX ORDER — ONDANSETRON 2 MG/ML
4 INJECTION INTRAMUSCULAR; INTRAVENOUS EVERY 4 HOURS PRN
Status: DISCONTINUED | OUTPATIENT
Start: 2025-05-01 | End: 2025-05-02 | Stop reason: HOSPADM

## 2025-05-01 RX ORDER — PROCHLORPERAZINE EDISYLATE 5 MG/ML
5-10 INJECTION INTRAMUSCULAR; INTRAVENOUS EVERY 4 HOURS PRN
Status: DISCONTINUED | OUTPATIENT
Start: 2025-05-01 | End: 2025-05-02 | Stop reason: HOSPADM

## 2025-05-01 RX ORDER — GAUZE BANDAGE 2" X 2"
100 BANDAGE TOPICAL DAILY
Status: DISCONTINUED | OUTPATIENT
Start: 2025-05-02 | End: 2025-05-02 | Stop reason: HOSPADM

## 2025-05-01 RX ORDER — LORAZEPAM 2 MG/1
2 TABLET ORAL ONCE
Status: COMPLETED | OUTPATIENT
Start: 2025-05-01 | End: 2025-05-01

## 2025-05-01 RX ADMIN — LORAZEPAM 2 MG: 1 TABLET ORAL at 19:53

## 2025-05-01 RX ADMIN — LORAZEPAM 2 MG: 1 TABLET ORAL at 22:20

## 2025-05-01 RX ADMIN — SODIUM CHLORIDE, POTASSIUM CHLORIDE, SODIUM LACTATE AND CALCIUM CHLORIDE: 600; 310; 30; 20 INJECTION, SOLUTION INTRAVENOUS at 19:58

## 2025-05-01 RX ADMIN — LORAZEPAM 3 MG: 1 TABLET ORAL at 18:25

## 2025-05-01 RX ADMIN — LORAZEPAM 2 MG: 2 TABLET ORAL at 12:51

## 2025-05-01 RX ADMIN — ONDANSETRON 4 MG: 2 INJECTION INTRAMUSCULAR; INTRAVENOUS at 12:51

## 2025-05-01 RX ADMIN — CHLORDIAZEPOXIDE HYDROCHLORIDE 50 MG: 25 CAPSULE ORAL at 18:25

## 2025-05-01 RX ADMIN — HALOPERIDOL LACTATE 5 MG: 5 INJECTION, SOLUTION INTRAMUSCULAR at 14:32

## 2025-05-01 RX ADMIN — SODIUM CHLORIDE, POTASSIUM CHLORIDE, SODIUM LACTATE AND CALCIUM CHLORIDE 2000 ML: 600; 310; 30; 20 INJECTION, SOLUTION INTRAVENOUS at 13:28

## 2025-05-01 RX ADMIN — MIDAZOLAM HYDROCHLORIDE 5 MG: 1 INJECTION, SOLUTION INTRAMUSCULAR; INTRAVENOUS at 13:28

## 2025-05-01 RX ADMIN — DIPHENHYDRAMINE HYDROCHLORIDE 50 MG: 50 INJECTION, SOLUTION INTRAMUSCULAR; INTRAVENOUS at 14:32

## 2025-05-01 ASSESSMENT — LIFESTYLE VARIABLES
EVER HAD A DRINK FIRST THING IN THE MORNING TO STEADY YOUR NERVES TO GET RID OF A HANGOVER: NO
EVER FELT BAD OR GUILTY ABOUT YOUR DRINKING: YES
TOTAL SCORE: MODERATE ITCHING, PINS AND NEEDLES SENSATION, BURNING OR NUMBNESS
PAROXYSMAL SWEATS: *
NAUSEA AND VOMITING: MILD NAUSEA WITH NO VOMITING
SUBSTANCE_ABUSE: 0
TREMOR: TREMOR NOT VISIBLE BUT CAN BE FELT, FINGERTIP TO FINGERTIP
TREMOR: *
PAROXYSMAL SWEATS: *
PAROXYSMAL SWEATS: BARELY PERCEPTIBLE SWEATING. PALMS MOIST
ANXIETY: *
HAVE YOU EVER FELT YOU SHOULD CUT DOWN ON YOUR DRINKING: YES
AGITATION: NORMAL ACTIVITY
TOTAL SCORE: 11
TOTAL SCORE: 14
ON A TYPICAL DAY WHEN YOU DRINK ALCOHOL HOW MANY DRINKS DO YOU HAVE: 5
VISUAL DISTURBANCES: NOT PRESENT
AUDITORY DISTURBANCES: NOT PRESENT
TOTAL SCORE: 2
TREMOR: TREMOR NOT VISIBLE BUT CAN BE FELT, FINGERTIP TO FINGERTIP
HEADACHE, FULLNESS IN HEAD: MODERATE
NAUSEA AND VOMITING: *
TOTAL SCORE: 2
AVERAGE NUMBER OF DAYS PER WEEK YOU HAVE A DRINK CONTAINING ALCOHOL: 1
VISUAL DISTURBANCES: NOT PRESENT
TOTAL SCORE: VERY MILD ITCHING, PINS AND NEEDLES SENSATION, BURNING OR NUMBNESS
TOTAL SCORE: MILD ITCHING, PINS AND NEEDLES SENSATION, BURNING OR NUMBNESS
NAUSEA AND VOMITING: *
TOTAL SCORE: 2
HEADACHE, FULLNESS IN HEAD: VERY MILD
ORIENTATION AND CLOUDING OF SENSORIUM: ORIENTED AND CAN DO SERIAL ADDITIONS
AUDITORY DISTURBANCES: NOT PRESENT
CONSUMPTION TOTAL: POSITIVE
AUDITORY DISTURBANCES: NOT PRESENT
ALCOHOL_USE: YES
DOES PATIENT WANT TO STOP DRINKING: YES
HAVE PEOPLE ANNOYED YOU BY CRITICIZING YOUR DRINKING: NO
VISUAL DISTURBANCES: NOT PRESENT
TOTAL SCORE: 19
HEADACHE, FULLNESS IN HEAD: VERY SEVERE
DOES PATIENT WANT TO TALK TO SOMEONE ABOUT QUITTING: NO
ANXIETY: MODERATELY ANXIOUS OR GUARDED, SO ANXIETY IS INFERRED
ORIENTATION AND CLOUDING OF SENSORIUM: ORIENTED AND CAN DO SERIAL ADDITIONS
AGITATION: NORMAL ACTIVITY
ANXIETY: MODERATELY ANXIOUS OR GUARDED, SO ANXIETY IS INFERRED
ORIENTATION AND CLOUDING OF SENSORIUM: ORIENTED AND CAN DO SERIAL ADDITIONS
AGITATION: NORMAL ACTIVITY
HOW MANY TIMES IN THE PAST YEAR HAVE YOU HAD 5 OR MORE DRINKS IN A DAY: 1

## 2025-05-01 ASSESSMENT — ENCOUNTER SYMPTOMS
NAUSEA: 1
POLYDIPSIA: 0
FLANK PAIN: 0
PND: 0
SORE THROAT: 0
DEPRESSION: 0
WHEEZING: 0
EYE PAIN: 0
DIZZINESS: 0
SHORTNESS OF BREATH: 0
HEMOPTYSIS: 0
DIARRHEA: 0
MYALGIAS: 0
DOUBLE VISION: 0
CONSTIPATION: 0
BRUISES/BLEEDS EASILY: 0
HEARTBURN: 0
DIAPHORESIS: 0
SINUS PAIN: 0
COUGH: 0
WEAKNESS: 0
BACK PAIN: 0
TINGLING: 0
CHILLS: 0
FALLS: 0
PHOTOPHOBIA: 0
ABDOMINAL PAIN: 1
SPUTUM PRODUCTION: 0
BLURRED VISION: 0
BLOOD IN STOOL: 0
HALLUCINATIONS: 1
FEVER: 0
TREMORS: 1
NECK PAIN: 0
HEADACHES: 0
PALPITATIONS: 0
ORTHOPNEA: 0
CLAUDICATION: 0
STRIDOR: 0
VOMITING: 1

## 2025-05-01 ASSESSMENT — PATIENT HEALTH QUESTIONNAIRE - PHQ9
2. FEELING DOWN, DEPRESSED, IRRITABLE, OR HOPELESS: NOT AT ALL
SUM OF ALL RESPONSES TO PHQ9 QUESTIONS 1 AND 2: 0
1. LITTLE INTEREST OR PLEASURE IN DOING THINGS: NOT AT ALL

## 2025-05-01 ASSESSMENT — PAIN DESCRIPTION - PAIN TYPE
TYPE: ACUTE PAIN
TYPE: ACUTE PAIN

## 2025-05-01 ASSESSMENT — COGNITIVE AND FUNCTIONAL STATUS - GENERAL
DAILY ACTIVITIY SCORE: 24
MOBILITY SCORE: 24
SUGGESTED CMS G CODE MODIFIER MOBILITY: CH
SUGGESTED CMS G CODE MODIFIER DAILY ACTIVITY: CH

## 2025-05-01 ASSESSMENT — SOCIAL DETERMINANTS OF HEALTH (SDOH)
IN THE PAST 12 MONTHS, HAS THE ELECTRIC, GAS, OIL, OR WATER COMPANY THREATENED TO SHUT OFF SERVICE IN YOUR HOME?: NO
WITHIN THE PAST 12 MONTHS, THE FOOD YOU BOUGHT JUST DIDN'T LAST AND YOU DIDN'T HAVE MONEY TO GET MORE: NEVER TRUE
WITHIN THE LAST YEAR, HAVE YOU BEEN HUMILIATED OR EMOTIONALLY ABUSED IN OTHER WAYS BY YOUR PARTNER OR EX-PARTNER?: NO
WITHIN THE LAST YEAR, HAVE TO BEEN RAPED OR FORCED TO HAVE ANY KIND OF SEXUAL ACTIVITY BY YOUR PARTNER OR EX-PARTNER?: NO
WITHIN THE LAST YEAR, HAVE YOU BEEN AFRAID OF YOUR PARTNER OR EX-PARTNER?: NO
WITHIN THE LAST YEAR, HAVE YOU BEEN KICKED, HIT, SLAPPED, OR OTHERWISE PHYSICALLY HURT BY YOUR PARTNER OR EX-PARTNER?: NO
WITHIN THE PAST 12 MONTHS, YOU WORRIED THAT YOUR FOOD WOULD RUN OUT BEFORE YOU GOT THE MONEY TO BUY MORE: NEVER TRUE

## 2025-05-01 ASSESSMENT — FIBROSIS 4 INDEX: FIB4 SCORE: 0.45

## 2025-05-01 NOTE — ED PROVIDER NOTES
" ED Provider Note    CHIEF COMPLAINT  Chief Complaint   Patient presents with    Abdominal Pain     Starting during triage.    ETOH Withdrawal     Pt reports last drink was this morning, approx. 5 shots of tequila.  Pt reports drinking about a pint of tequila a day.       EXTERNAL RECORDS REVIEWED  Admitted here in August of last year as well as neighboring facility in September of last month both for complicated alcohol withdrawal.    HPI/ROS    LIMITATION TO HISTORY   Select: : None    OUTSIDE HISTORIAN(S):      Praful Carmen is a 25 y.o. male who presents to the emergency department with chief complaint alcohol withdrawal.  Reports that he last drank 5 shots of tequila this morning.  States complicated alcohol withdrawal before previous alcohol withdrawal seizure.  He also reports that he injected some anabolic steroids and into his abdomen yesterday evening in an attempt to get \"swoll\".  Now patient reports that he is having some moderate diffuse abdominal pain he feels very nauseous he is at episodes of emesis and feels very shaky.  He denies other drug use but states that he is not quite sure if there may have been drugs and the steroids that he injected.    PAST MEDICAL HISTORY   Alcohol dependence/alcohol withdrawal    SURGICAL HISTORY   has a past surgical history that includes submandible abscess incision and drainage (Left, 6/7/2017).    FAMILY HISTORY  No family history on file.    SOCIAL HISTORY  Social History     Tobacco Use    Smoking status: Never     Passive exposure: Yes    Smokeless tobacco: Never   Substance and Sexual Activity    Alcohol use: Yes     Comment: ocassionally    Drug use: Yes     Types: Inhaled     Comment: marijuana    Sexual activity: Not on file       CURRENT MEDICATIONS  Home Medications       Reviewed by Constantine Harris, Student (Nurse Apprentice) on 05/01/25 at 1134  Med List Status: <None>     Medication Last Dose Status   acetaminophen (TYLENOL) 160 MG/5ML " "liquid  Active   gabapentin (NEURONTIN) 250 MG/5ML solution  Active   ondansetron (ZOFRAN ODT) 4 MG TABLET DISPERSIBLE  Active   sertraline (ZOLOFT) 25 MG tablet  Active                    ALLERGIES  No Known Allergies    PHYSICAL EXAM  VITAL SIGNS: /49   Pulse (!) 102   Temp 36.3 °C (97.4 °F) (Temporal)   Resp 16   Ht 1.702 m (5' 7\")   Wt 72.2 kg (159 lb 2.8 oz)   SpO2 96%   BMI 24.93 kg/m²      Pulse ox interpretation: I interpret this pulse ox as normal.  Constitutional: Alert and oriented x 3, moderate distress, agitated disheveled and unkempt  HEENT: Atraumatic normocephalic, pupils are equal round reactive to light extraocular movements are intact. The nares is clear, external ears are normal, mouth shows moist mucous membranes normal dentition for age  Neck: Supple, no JVD no tracheal deviation  Cardiovascular: Tachycardic no murmur rub or gallop 2+ pulses peripherally x4  Thorax & Lungs: No respiratory distress, no wheezes rales or rhonchi, No chest tenderness.   GI: Soft nontender nondistended positive bowel sounds, no peritoneal signs  Skin: Warm dry no acute rash or lesion  Musculoskeletal: Moving all extremities with full range and 5 of 5 strength no acute  deformity  Neurologic: Cranial nerves III through XII are grossly intact no sensory deficit no cerebellar dysfunction, tremulous  Psychiatric: Appropriate affect for situation at this time      EKG/LABS  Results for orders placed or performed during the hospital encounter of 05/01/25   CBC WITH DIFFERENTIAL    Collection Time: 05/01/25 11:48 AM   Result Value Ref Range    WBC 11.1 (H) 4.8 - 10.8 K/uL    RBC 6.62 (H) 4.70 - 6.10 M/uL    Hemoglobin 18.5 (H) 14.0 - 18.0 g/dL    Hematocrit 53.5 (H) 42.0 - 52.0 %    MCV 80.8 (L) 81.4 - 97.8 fL    MCH 27.9 27.0 - 33.0 pg    MCHC 34.6 32.3 - 36.5 g/dL    RDW 39.0 35.9 - 50.0 fL    Platelet Count 488 (H) 164 - 446 K/uL    MPV 9.6 9.0 - 12.9 fL    Neutrophils-Polys 66.00 44.00 - 72.00 %    " Lymphocytes 29.20 22.00 - 41.00 %    Monocytes 3.60 0.00 - 13.40 %    Eosinophils 0.40 0.00 - 6.90 %    Basophils 0.50 0.00 - 1.80 %    Immature Granulocytes 0.30 0.00 - 0.90 %    Nucleated RBC 0.00 0.00 - 0.20 /100 WBC    Neutrophils (Absolute) 7.33 1.82 - 7.42 K/uL    Lymphs (Absolute) 3.23 1.00 - 4.80 K/uL    Monos (Absolute) 0.40 0.00 - 0.85 K/uL    Eos (Absolute) 0.04 0.00 - 0.51 K/uL    Baso (Absolute) 0.05 0.00 - 0.12 K/uL    Immature Granulocytes (abs) 0.03 0.00 - 0.11 K/uL    NRBC (Absolute) 0.00 K/uL   COMP METABOLIC PANEL    Collection Time: 05/01/25 11:48 AM   Result Value Ref Range    Sodium 141 135 - 145 mmol/L    Potassium 4.0 3.6 - 5.5 mmol/L    Chloride 101 96 - 112 mmol/L    Co2 16 (L) 20 - 33 mmol/L    Anion Gap 24.0 (H) 7.0 - 16.0    Glucose 124 (H) 65 - 99 mg/dL    Bun 8 8 - 22 mg/dL    Creatinine 0.97 0.50 - 1.40 mg/dL    Calcium 9.3 8.5 - 10.5 mg/dL    Correct Calcium 8.5 8.5 - 10.5 mg/dL    AST(SGOT) 39 12 - 45 U/L    ALT(SGPT) 22 2 - 50 U/L    Alkaline Phosphatase 121 (H) 30 - 99 U/L    Total Bilirubin 0.5 0.1 - 1.5 mg/dL    Albumin 5.0 (H) 3.2 - 4.9 g/dL    Total Protein 8.6 (H) 6.0 - 8.2 g/dL    Globulin 3.6 (H) 1.9 - 3.5 g/dL    A-G Ratio 1.4 g/dL   LIPASE    Collection Time: 05/01/25 11:48 AM   Result Value Ref Range    Lipase 32 11 - 82 U/L   ESTIMATED GFR    Collection Time: 05/01/25 11:48 AM   Result Value Ref Range    GFR (CKD-EPI) 110 >60 mL/min/1.73 m 2   DIAGNOSTIC ALCOHOL    Collection Time: 05/01/25  3:00 PM   Result Value Ref Range    Diagnostic Alcohol 226.0 (H) <10.1 mg/dL   MAGNESIUM    Collection Time: 05/01/25  3:00 PM   Result Value Ref Range    Magnesium 1.9 1.5 - 2.5 mg/dL      I have independently interpreted this EKG      COURSE & MEDICAL DECISION MAKING    ASSESSMENT, COURSE AND PLAN  Care Narrative: Persistently tachycardic tremulous despite multiple drugs including lorazepam midazolam Haldol diphenhydramine and given IV hydration due to his profound anion gap.   Patient will require ongoing hydration and also ongoing management of complicated alcohol withdrawal.  Have discussed case with hospitalist Dr. Melvin and the patient is admitted in guarded condition for ongoing management.    Hydration: Based on the patient's presentation of Dehydration the patient was given IV fluids. IV Hydration was used because oral hydration was not as rapid as required. Upon recheck following hydration, the patient was improved.        FINAL DIAGNOSIS  1. Alcohol withdrawal syndrome with complication (HCC) Active   2. Dehydration Active   3. High anion gap metabolic acidosis Active        Electronically signed by: Bertram Haro M.D.

## 2025-05-01 NOTE — ED NOTES
Break rn note: pt became anxious sweating c/o inability to breath and hyperventilating. Pt instructed on breathing techniques however pt unable to calm down. Md aware orders received and followed  
Med Rec completed per patient   Allergies reviewed  No ORAL antibiotics in last 30 days  Dispense history available in EPIC? No    Patient is not taking anticoagulants     
Report given to Adelina GARCIA.  
No

## 2025-05-01 NOTE — ED TRIAGE NOTES
"Chief Complaint   Patient presents with    Abdominal Pain     Starting during triage.    ETOH Withdrawal     Pt reports last drink was this morning, approx. 5 shots of tequila.  Pt reports drinking about a pint of tequila a day.     Pt is AAOx3, speaking in full sentences, tearful during triage. Ambulatory to and from lobby. Respirations even and unlabored.    /49   Pulse (!) 102   Temp 36.3 °C (97.4 °F) (Temporal)   Resp 16   Ht 1.702 m (5' 7\")   Wt 72.2 kg (159 lb 2.8 oz)   SpO2 96%   BMI 24.93 kg/m²   "

## 2025-05-02 VITALS
HEART RATE: 89 BPM | OXYGEN SATURATION: 93 % | TEMPERATURE: 98.2 F | WEIGHT: 159.17 LBS | SYSTOLIC BLOOD PRESSURE: 135 MMHG | HEIGHT: 67 IN | RESPIRATION RATE: 16 BRPM | BODY MASS INDEX: 24.98 KG/M2 | DIASTOLIC BLOOD PRESSURE: 66 MMHG

## 2025-05-02 LAB
ALBUMIN SERPL BCP-MCNC: 4.3 G/DL (ref 3.2–4.9)
ALBUMIN/GLOB SERPL: 1.5 G/DL
ALP SERPL-CCNC: 97 U/L (ref 30–99)
ALT SERPL-CCNC: 19 U/L (ref 2–50)
ANION GAP SERPL CALC-SCNC: 16 MMOL/L (ref 7–16)
AST SERPL-CCNC: 31 U/L (ref 12–45)
BILIRUB SERPL-MCNC: 0.8 MG/DL (ref 0.1–1.5)
BUN SERPL-MCNC: 8 MG/DL (ref 8–22)
CALCIUM ALBUM COR SERPL-MCNC: 8.6 MG/DL (ref 8.5–10.5)
CALCIUM SERPL-MCNC: 8.8 MG/DL (ref 8.5–10.5)
CHLORIDE SERPL-SCNC: 101 MMOL/L (ref 96–112)
CO2 SERPL-SCNC: 23 MMOL/L (ref 20–33)
CREAT SERPL-MCNC: 0.88 MG/DL (ref 0.5–1.4)
GFR SERPLBLD CREATININE-BSD FMLA CKD-EPI: 122 ML/MIN/1.73 M 2
GLOBULIN SER CALC-MCNC: 2.9 G/DL (ref 1.9–3.5)
GLUCOSE SERPL-MCNC: 116 MG/DL (ref 65–99)
MAGNESIUM SERPL-MCNC: 1.8 MG/DL (ref 1.5–2.5)
PHOSPHATE SERPL-MCNC: 3.3 MG/DL (ref 2.5–4.5)
POTASSIUM SERPL-SCNC: 3.6 MMOL/L (ref 3.6–5.5)
PROT SERPL-MCNC: 7.2 G/DL (ref 6–8.2)
SODIUM SERPL-SCNC: 140 MMOL/L (ref 135–145)

## 2025-05-02 PROCEDURE — 84100 ASSAY OF PHOSPHORUS: CPT

## 2025-05-02 PROCEDURE — 99239 HOSP IP/OBS DSCHRG MGMT >30: CPT | Performed by: STUDENT IN AN ORGANIZED HEALTH CARE EDUCATION/TRAINING PROGRAM

## 2025-05-02 PROCEDURE — 700111 HCHG RX REV CODE 636 W/ 250 OVERRIDE (IP): Mod: JZ | Performed by: HOSPITALIST

## 2025-05-02 PROCEDURE — 80053 COMPREHEN METABOLIC PANEL: CPT

## 2025-05-02 PROCEDURE — 83735 ASSAY OF MAGNESIUM: CPT

## 2025-05-02 PROCEDURE — A9270 NON-COVERED ITEM OR SERVICE: HCPCS | Performed by: HOSPITALIST

## 2025-05-02 PROCEDURE — 51798 US URINE CAPACITY MEASURE: CPT

## 2025-05-02 PROCEDURE — 700105 HCHG RX REV CODE 258: Performed by: HOSPITALIST

## 2025-05-02 PROCEDURE — 700102 HCHG RX REV CODE 250 W/ 637 OVERRIDE(OP): Performed by: HOSPITALIST

## 2025-05-02 RX ORDER — LANOLIN ALCOHOL/MO/W.PET/CERES
100 CREAM (GRAM) TOPICAL DAILY
Qty: 90 TABLET | Refills: 0 | Status: SHIPPED | OUTPATIENT
Start: 2025-05-03 | End: 2025-08-01

## 2025-05-02 RX ORDER — FOLIC ACID 1 MG/1
1 TABLET ORAL DAILY
Qty: 90 TABLET | Refills: 0 | Status: SHIPPED | OUTPATIENT
Start: 2025-05-03 | End: 2025-08-01

## 2025-05-02 RX ADMIN — FOLIC ACID 1 MG: 1 TABLET ORAL at 05:22

## 2025-05-02 RX ADMIN — LORAZEPAM 2 MG: 1 TABLET ORAL at 03:17

## 2025-05-02 RX ADMIN — THERA TABS 1 TABLET: TAB at 05:22

## 2025-05-02 RX ADMIN — CHLORDIAZEPOXIDE HYDROCHLORIDE 50 MG: 25 CAPSULE ORAL at 00:04

## 2025-05-02 RX ADMIN — LORAZEPAM 3 MG: 1 TABLET ORAL at 02:02

## 2025-05-02 RX ADMIN — LORAZEPAM 2 MG: 1 TABLET ORAL at 06:08

## 2025-05-02 RX ADMIN — SODIUM CHLORIDE, POTASSIUM CHLORIDE, SODIUM LACTATE AND CALCIUM CHLORIDE: 600; 310; 30; 20 INJECTION, SOLUTION INTRAVENOUS at 03:20

## 2025-05-02 RX ADMIN — CHLORDIAZEPOXIDE HYDROCHLORIDE 50 MG: 25 CAPSULE ORAL at 06:16

## 2025-05-02 RX ADMIN — Medication 100 MG: at 05:22

## 2025-05-02 RX ADMIN — ONDANSETRON 4 MG: 2 INJECTION INTRAMUSCULAR; INTRAVENOUS at 01:22

## 2025-05-02 RX ADMIN — LORAZEPAM 2 MG: 1 TABLET ORAL at 04:40

## 2025-05-02 RX ADMIN — LORAZEPAM 2 MG: 1 TABLET ORAL at 08:19

## 2025-05-02 ASSESSMENT — LIFESTYLE VARIABLES
NAUSEA AND VOMITING: MILD NAUSEA WITH NO VOMITING
AGITATION: SOMEWHAT MORE THAN NORMAL ACTIVITY
HEADACHE, FULLNESS IN HEAD: MILD
AUDITORY DISTURBANCES: NOT PRESENT
VISUAL DISTURBANCES: NOT PRESENT
PAROXYSMAL SWEATS: *
VISUAL DISTURBANCES: NOT PRESENT
PAROXYSMAL SWEATS: *
TREMOR: *
TOTAL SCORE: VERY MILD ITCHING, PINS AND NEEDLES SENSATION, BURNING OR NUMBNESS
HEADACHE, FULLNESS IN HEAD: NOT PRESENT
AUDITORY DISTURBANCES: NOT PRESENT
HEADACHE, FULLNESS IN HEAD: MODERATE
TREMOR: *
ANXIETY: MODERATELY ANXIOUS OR GUARDED, SO ANXIETY IS INFERRED
AGITATION: NORMAL ACTIVITY
HEADACHE, FULLNESS IN HEAD: MILD
TOTAL SCORE: 4
TREMOR: *
TOTAL SCORE: 12
VISUAL DISTURBANCES: NOT PRESENT
TREMOR: *
PAROXYSMAL SWEATS: BARELY PERCEPTIBLE SWEATING. PALMS MOIST
NAUSEA AND VOMITING: NO NAUSEA AND NO VOMITING
AGITATION: NORMAL ACTIVITY
PAROXYSMAL SWEATS: *
ANXIETY: *
PAROXYSMAL SWEATS: *
ORIENTATION AND CLOUDING OF SENSORIUM: ORIENTED AND CAN DO SERIAL ADDITIONS
AGITATION: NORMAL ACTIVITY
VISUAL DISTURBANCES: NOT PRESENT
AGITATION: NORMAL ACTIVITY
AUDITORY DISTURBANCES: NOT PRESENT
ANXIETY: MODERATELY ANXIOUS OR GUARDED, SO ANXIETY IS INFERRED
ORIENTATION AND CLOUDING OF SENSORIUM: ORIENTED AND CAN DO SERIAL ADDITIONS
NAUSEA AND VOMITING: MILD NAUSEA WITH NO VOMITING
HEADACHE, FULLNESS IN HEAD: NOT PRESENT
PAROXYSMAL SWEATS: BARELY PERCEPTIBLE SWEATING. PALMS MOIST
TREMOR: *
HEADACHE, FULLNESS IN HEAD: MILD
TOTAL SCORE: VERY MILD ITCHING, PINS AND NEEDLES SENSATION, BURNING OR NUMBNESS
TOTAL SCORE: 12
ORIENTATION AND CLOUDING OF SENSORIUM: ORIENTED AND CAN DO SERIAL ADDITIONS
NAUSEA AND VOMITING: *
ANXIETY: MODERATELY ANXIOUS OR GUARDED, SO ANXIETY IS INFERRED
HEADACHE, FULLNESS IN HEAD: VERY MILD
NAUSEA AND VOMITING: *
ANXIETY: *
TOTAL SCORE: 11
AUDITORY DISTURBANCES: NOT PRESENT
TOTAL SCORE: 12
PAROXYSMAL SWEATS: NO SWEAT VISIBLE
AUDITORY DISTURBANCES: NOT PRESENT
TREMOR: *
VISUAL DISTURBANCES: NOT PRESENT
AGITATION: NORMAL ACTIVITY
TOTAL SCORE: 11
ORIENTATION AND CLOUDING OF SENSORIUM: ORIENTED AND CAN DO SERIAL ADDITIONS
NAUSEA AND VOMITING: *
AUDITORY DISTURBANCES: NOT PRESENT
TOTAL SCORE: 17
AGITATION: NORMAL ACTIVITY
TOTAL SCORE: MILD ITCHING, PINS AND NEEDLES SENSATION, BURNING OR NUMBNESS
ORIENTATION AND CLOUDING OF SENSORIUM: ORIENTED AND CAN DO SERIAL ADDITIONS
NAUSEA AND VOMITING: INTERMITTENT NAUSEA WITH DRY HEAVES
AUDITORY DISTURBANCES: NOT PRESENT
TREMOR: TREMOR NOT VISIBLE BUT CAN BE FELT, FINGERTIP TO FINGERTIP
VISUAL DISTURBANCES: NOT PRESENT
ANXIETY: MODERATELY ANXIOUS OR GUARDED, SO ANXIETY IS INFERRED
ORIENTATION AND CLOUDING OF SENSORIUM: ORIENTED AND CAN DO SERIAL ADDITIONS
VISUAL DISTURBANCES: NOT PRESENT
ANXIETY: *
ORIENTATION AND CLOUDING OF SENSORIUM: ORIENTED AND CAN DO SERIAL ADDITIONS

## 2025-05-02 ASSESSMENT — PAIN DESCRIPTION - PAIN TYPE: TYPE: ACUTE PAIN

## 2025-05-02 NOTE — CARE PLAN
The patient is Watcher - Medium risk of patient condition declining or worsening    Shift Goals  Clinical Goals: Monitor CIWA  Patient Goals: rest, sleep  Family Goals: avelina    Patient A&Ox4, on room air. Patient denies feelings of pain, patient denies hallucinations at this time. CIWA continued and monitored, seziure precautions and fall precautions in place. Call light within reach, all needs met at this time.     Progress made toward(s) clinical / shift goals:        Problem: Knowledge Deficit - Standard  Goal: Patient and family/care givers will demonstrate understanding of plan of care, disease process/condition, diagnostic tests and medications  Outcome: Progressing     Problem: Optimal Care for Alcohol Withdrawal  Goal: Optimal Care for the alcohol withdrawal patient  Outcome: Progressing     Problem: Seizure Precautions  Goal: Implementation of seizure precautions  Outcome: Progressing     Problem: Psychosocial  Goal: Patient's level of anxiety will decrease  Outcome: Progressing     Problem: Fall Risk  Goal: Patient will remain free from falls  Outcome: Progressing

## 2025-05-02 NOTE — DISCHARGE PLANNING
Care Transition Team Assessment      Patient is a 25 year-old male admitted for alcohol abuse with withdrawal and perceptual disturbance. Please see patient's H&P for prior medical history. MSW student met with patient at bedside to complete assessment. Patient is A&Ox4 and able to verify the information on the face sheet. Patient lives with his mom in a 1 story house with 1 steps to enter, Angela p) [phone]; mom and emergency contact. No Advance Directive on file, packet refused. Prior to admission patient is independent with ADL's and IADL’s. Patient does not use any DME at baseline. Patient reported that his mom is good support for him. The patient[facility]. Patient's preferred pharmacy is any pharmacy. Patient denies a history of SNF/IPR nor HHC use in the past. Patient denies any SA or MH concerns. Patient has means of transportation when medically cleared and his mom will provide transport once stable for discharge.       Information Source  Orientation Level: Oriented X4  Information Given By: Patient  Who is responsible for making decisions for patient? : Patient    Readmission Evaluation  Is this a readmission?: No    Elopement Risk  Legal Hold: No  Ambulatory or Self Mobile in Wheelchair: Yes  Disoriented: No  Psychiatric Symptoms: None  History of Wandering: No  Elopement this Admit: No  Vocalizing Wanting to Leave: No  Displays Behaviors, Body Language Wanting to Leave: No-Not at Risk for Elopement  Elopement Risk: Not at Risk for Elopement    Interdisciplinary Discharge Planning  Lives with - Patient's Self Care Capacity: Sibling  Patient or legal guardian wants to designate a caregiver: No  Support Systems: Parent, Spouse / Significant Other  Housing / Facility: 1 Story House    Discharge Preparedness  What is your plan after discharge?: Home with help  What are your discharge supports?: Parent  Prior Functional Level: Ambulatory, Independent with Activities of Daily Living, Independent  with Medication Management  Difficulity with ADLs: None  Difficulity with IADLs: None    Functional Assesment  Prior Functional Level: Ambulatory, Independent with Activities of Daily Living, Independent with Medication Management    Finances  Financial Barriers to Discharge: No  Prescription Coverage: No    Vision / Hearing Impairment  Vision Impairment : No  Hearing Impairment : No         Advance Directive  Advance Directive?: None  Advance Directive offered?: AD Booklet refused    Domestic Abuse  Possible Abuse/Neglect Reported to:: Not Applicable    Psychological Assessment  History of Substance Abuse: Alcohol  History of Psychiatric Problems: No  Non-compliant with Treatment: No  Newly Diagnosed Illness: Yes    Discharge Risks or Barriers  Discharge risks or barriers?: No PCP, Uninsured / underinsured, Substance abuse, Complex medical needs  Patient risk factors: Complex medical needs, No PCP    Anticipated Discharge Information  Discharge Disposition: Discharged to home/self care (01)

## 2025-05-02 NOTE — DISCHARGE SUMMARY
Discharge Summary    CHIEF COMPLAINT ON ADMISSION  Chief Complaint   Patient presents with    Abdominal Pain     Starting during triage.    ETOH Withdrawal     Pt reports last drink was this morning, approx. 5 shots of tequila.  Pt reports drinking about a pint of tequila a day.       Reason for Admission  ETOH detox     Admission Date  5/1/2025    CODE STATUS  Prior    HPI & HOSPITAL COURSE  For full details please refer to HPI    Praful Carmen is a 25 y.o. male who presented 5/1/2025 with past medical history of alcohol abuse, seizures, esophageal perforation who presents to the hospital for withdrawal symptoms.  The patient usually drinks hard liquor every day. Also reports used of steroid.  he started developing epigastric abdominal pain, nausea, vomiting, tremors, hallucinations, eye fluttering.  Patient states for the past 2 to 3 days he is also been having watery diarrhea.  Patient does have a history of repeated hospitalizations for alcohol withdrawal. Admitted to the hospital for further management.CIWA score has been minimal. Denies further hallucination. Tremors has been resolved. Denies diarrhea. He would like to be discharge today. Thus discharged in stable condition. Return to ER precaution was given. Patient said he will follow up with alcohol cessation cessation program outpatient.       Therefore, he is discharged in good and stable condition to home with close outpatient follow-up.    The patient met 2-midnight criteria for an inpatient stay at the time of discharge.    Discharge Date  5/2    FOLLOW UP ITEMS POST DISCHARGE  PCP    DISCHARGE DIAGNOSES  Principal Problem:    Alcohol abuse with withdrawal and perceptual disturbance (HCC) (POA: Yes)  Active Problems:    High anion gap metabolic acidosis (POA: Unknown)    Hallucinations (POA: Unknown)  Resolved Problems:    * No resolved hospital problems. *      FOLLOW UP  No future appointments.  Primary Care Provider    Schedule an appointment  as soon as possible for a visit        MEDICATIONS ON DISCHARGE     Medication List        START taking these medications        Instructions   folic acid 1 MG Tabs  Start taking on: May 3, 2025  Commonly known as: Folvite   Take 1 Tablet by mouth every day for 90 days.  Dose: 1 mg     multivitamin Tabs  Start taking on: May 3, 2025   Take 1 Tablet by mouth every day for 90 days.  Dose: 1 Tablet     thiamine 100 MG tablet  Start taking on: May 3, 2025  Commonly known as: Thiamine   Take 1 Tablet by mouth every day for 90 days.  Dose: 100 mg              Allergies  No Known Allergies    DIET  No orders of the defined types were placed in this encounter.      ACTIVITY  As tolerated.  Weight bearing as tolerated    CONSULTATIONS  none    PROCEDURES  none    LABORATORY  Lab Results   Component Value Date    SODIUM 140 05/02/2025    POTASSIUM 3.6 05/02/2025    CHLORIDE 101 05/02/2025    CO2 23 05/02/2025    GLUCOSE 116 (H) 05/02/2025    BUN 8 05/02/2025    CREATININE 0.88 05/02/2025        Lab Results   Component Value Date    WBC 11.1 (H) 05/01/2025    HEMOGLOBIN 18.5 (H) 05/01/2025    HEMATOCRIT 53.5 (H) 05/01/2025    PLATELETCT 488 (H) 05/01/2025        Total time of the discharge process exceeds 33 minutes.

## 2025-05-02 NOTE — ASSESSMENT & PLAN NOTE
Patient does have a history of mood disorder and is complaining about hallucinations  IV Haldol as needed

## 2025-05-02 NOTE — ASSESSMENT & PLAN NOTE
[facility] protocol   multivitamin, thiamine and folate  Replete electrolytes  Patient has been counseled on alcohol cessation and will be provided with information for outpatient detox facilities

## 2025-05-02 NOTE — PROGRESS NOTES
Discharge instructions reviewed and signed, all questions answered, PIV removed on unit, patient will return later to  medications from pharmacy.

## 2025-05-02 NOTE — H&P
Hospital Medicine History & Physical Note    Date of Service  5/1/2025    Primary Care Physician  Pcp Pt States None    Consultants      Specialist Names:     Code Status  Full Code    Chief Complaint  Chief Complaint   Patient presents with    Abdominal Pain     Starting during triage.    ETOH Withdrawal     Pt reports last drink was this morning, approx. 5 shots of tequila.  Pt reports drinking about a pint of tequila a day.       History of Presenting Illness  Praful Carmen is a 25 y.o. male who presented 5/1/2025 with past medical history of alcohol abuse, seizures, esophageal perforation who presents to the hospital for withdrawal symptoms.  The patient usually drinks hard liquor every day.  Today he started developing epigastric abdominal pain, nausea, vomiting, tremors, hallucinations, eye fluttering.  Patient states for the past 2 to 3 days he is also been having watery diarrhea.  Patient does have a history of repeated hospitalizations for alcohol withdrawal.    I discussed the plan of care with patient.    Review of Systems  Review of Systems   Constitutional:  Positive for malaise/fatigue. Negative for chills, diaphoresis and fever.   HENT:  Negative for congestion, ear discharge, ear pain, hearing loss, nosebleeds, sinus pain, sore throat and tinnitus.    Eyes:  Negative for blurred vision, double vision, photophobia and pain.   Respiratory:  Negative for cough, hemoptysis, sputum production, shortness of breath, wheezing and stridor.    Cardiovascular:  Negative for chest pain, palpitations, orthopnea, claudication, leg swelling and PND.   Gastrointestinal:  Positive for abdominal pain, nausea and vomiting. Negative for blood in stool, constipation, diarrhea, heartburn and melena.   Genitourinary:  Negative for dysuria, flank pain, frequency, hematuria and urgency.   Musculoskeletal:  Negative for back pain, falls, joint pain, myalgias and neck pain.   Skin:  Negative for itching and rash.    Neurological:  Positive for tremors. Negative for dizziness, tingling, weakness and headaches.   Endo/Heme/Allergies:  Negative for environmental allergies and polydipsia. Does not bruise/bleed easily.   Psychiatric/Behavioral:  Positive for hallucinations. Negative for depression, substance abuse and suicidal ideas.        Past Medical History    Medical history reviewed nonpertinent    Surgical History   has a past surgical history that includes submandible abscess incision and drainage (Left, 6/7/2017).     Family History  Family history reviewed with patient. There is no family history that is pertinent to the chief complaint.     Social History   reports that he has never smoked. He has been exposed to tobacco smoke. He has never used smokeless tobacco. He reports current alcohol use. He reports current drug use. Drug: Inhaled.    Allergies  No Known Allergies    Medications  None       Physical Exam  Temp:  [36.3 °C (97.4 °F)-37.2 °C (98.9 °F)] 37.2 °C (98.9 °F)  Pulse:  [102-116] 113  Resp:  [15-21] 20  BP: (103-126)/(49-91) 122/73  SpO2:  [93 %-100 %] 100 %  Blood Pressure: 122/73   Temperature: 37.2 °C (98.9 °F)   Pulse: (!) 113   Respiration: 20   Pulse Oximetry: 100 %       Physical Exam  Vitals and nursing note reviewed.   Constitutional:       General: He is not in acute distress.     Appearance: Normal appearance. He is not ill-appearing, toxic-appearing or diaphoretic.      Comments: Tremor   HENT:      Head: Normocephalic and atraumatic.      Nose: No congestion or rhinorrhea.      Mouth/Throat:      Pharynx: No posterior oropharyngeal erythema.   Eyes:      General: No scleral icterus.        Right eye: No discharge.   Cardiovascular:      Rate and Rhythm: Normal rate and regular rhythm.      Pulses: Normal pulses.      Heart sounds: Normal heart sounds. No murmur heard.     No friction rub. No gallop.   Pulmonary:      Effort: Pulmonary effort is normal. No respiratory distress.      Breath  "sounds: Normal breath sounds. No stridor. No wheezing, rhonchi or rales.   Abdominal:      General: There is no distension.      Tenderness: There is no abdominal tenderness.   Musculoskeletal:         General: No swelling, tenderness, deformity or signs of injury.      Cervical back: Normal range of motion.      Right lower leg: No edema.      Left lower leg: No edema.   Skin:     Capillary Refill: Capillary refill takes more than 3 seconds.      Coloration: Skin is not jaundiced or pale.      Findings: No bruising, erythema, lesion or rash.   Neurological:      General: No focal deficit present.      Mental Status: He is alert and oriented to person, place, and time.         Laboratory:  Recent Labs     05/01/25  1148   WBC 11.1*   RBC 6.62*   HEMOGLOBIN 18.5*   HEMATOCRIT 53.5*   MCV 80.8*   MCH 27.9   MCHC 34.6   RDW 39.0   PLATELETCT 488*   MPV 9.6     Recent Labs     05/01/25  1148   SODIUM 141   POTASSIUM 4.0   CHLORIDE 101   CO2 16*   GLUCOSE 124*   BUN 8   CREATININE 0.97   CALCIUM 9.3     Recent Labs     05/01/25  1148   ALTSGPT 22   ASTSGOT 39   ALKPHOSPHAT 121*   TBILIRUBIN 0.5   LIPASE 32   GLUCOSE 124*         No results for input(s): \"NTPROBNP\" in the last 72 hours.      No results for input(s): \"TROPONINT\" in the last 72 hours.    Imaging:  No orders to display       no X-Ray or EKG requiring interpretation    Assessment/Plan:  Justification for Admission Status  I anticipate this patient will require at least two midnights for appropriate medical management, necessitating inpatient admission because alcohol abuse    Patient will need a Med/Surg bed on MEDICAL service .  The need is secondary to alcohol abuse.    * Alcohol abuse with withdrawal and perceptual disturbance (HCC)- (present on admission)  Assessment & Plan  [facility] protocol   multivitamin, thiamine and folate  Replete electrolytes  Patient has been counseled on alcohol cessation and will be provided with information for outpatient detox " facilities      Hallucinations  Assessment & Plan  Patient does have a history of mood disorder and is complaining about hallucinations  IV Haldol as needed    High anion gap metabolic acidosis  Assessment & Plan  Continue aggressive IV hydration        VTE prophylaxis: SCDs/TEDs

## 2025-05-02 NOTE — PROGRESS NOTES
4 Eyes Skin Assessment Completed by Chiara RN and RADHA Mata.    Head WDL  Ears Redness and Blanching  Nose WDL  Mouth WDL  Neck WDL  Breast/Chest WDL  Shoulder Blades WDL  Spine birthmark on right back  (R) Arm/Elbow/Hand WDL  (L) Arm/Elbow/Hand WDL  Abdomen WDL  Groin WDL  Scrotum/Coccyx/Buttocks WDL  (R) Leg WDL  (L) Leg WDL  (R) Heel/Foot/Toe WDL  (L) Heel/Foot/Toe WDL          Devices In Places PIV      Interventions In Place Pillows    Possible Skin Injury No    Pictures Uploaded Into Epic N/A  Wound Consult Placed N/A  RN Wound Prevention Protocol Ordered No

## 2025-05-02 NOTE — DISCHARGE INSTRUCTIONS
Discharge Instructions    Discharged to home by car with friend. Discharged via wheelchair, hospital escort: Yes.  Special equipment needed: Not Applicable    Be sure to schedule a follow-up appointment with your primary care doctor or any specialists as instructed.     Discharge Plan:   Diet Plan: Discussed  Activity Level: Discussed  Confirmed Follow up Appointment: Patient to Call and Schedule Appointment  Confirmed Symptoms Management: Discussed  Medication Reconciliation Updated: Yes    I understand that a diet low in cholesterol, fat, and sodium is recommended for good health. Unless I have been given specific instructions below for another diet, I accept this instruction as my diet prescription.   Other diet: regular    Special Instructions: None    -Is this patient being discharged with medication to prevent blood clots?  No    Is patient discharged on Warfarin / Coumadin?   No

## 2025-06-15 ENCOUNTER — APPOINTMENT (OUTPATIENT)
Dept: RADIOLOGY | Facility: MEDICAL CENTER | Age: 25
DRG: 894 | End: 2025-06-15
Attending: STUDENT IN AN ORGANIZED HEALTH CARE EDUCATION/TRAINING PROGRAM

## 2025-06-15 ENCOUNTER — HOSPITAL ENCOUNTER (INPATIENT)
Facility: MEDICAL CENTER | Age: 25
LOS: 2 days | DRG: 894 | End: 2025-06-17
Attending: STUDENT IN AN ORGANIZED HEALTH CARE EDUCATION/TRAINING PROGRAM | Admitting: STUDENT IN AN ORGANIZED HEALTH CARE EDUCATION/TRAINING PROGRAM

## 2025-06-15 DIAGNOSIS — R10.13 EPIGASTRIC PAIN: ICD-10-CM

## 2025-06-15 DIAGNOSIS — F10.930 ALCOHOL WITHDRAWAL SEIZURE WITHOUT COMPLICATION (HCC): Primary | ICD-10-CM

## 2025-06-15 DIAGNOSIS — R56.9 ALCOHOL WITHDRAWAL SEIZURE WITHOUT COMPLICATION (HCC): Primary | ICD-10-CM

## 2025-06-15 DIAGNOSIS — R56.9 SEIZURE (HCC): ICD-10-CM

## 2025-06-15 LAB
ALBUMIN SERPL BCP-MCNC: 5 G/DL (ref 3.2–4.9)
ALBUMIN/GLOB SERPL: 1.4 G/DL
ALP SERPL-CCNC: 105 U/L (ref 30–99)
ALT SERPL-CCNC: 46 U/L (ref 2–50)
ANION GAP SERPL CALC-SCNC: 18 MMOL/L (ref 7–16)
AST SERPL-CCNC: 38 U/L (ref 12–45)
BASOPHILS # BLD AUTO: 0.4 % (ref 0–1.8)
BASOPHILS # BLD: 0.04 K/UL (ref 0–0.12)
BILIRUB SERPL-MCNC: 0.4 MG/DL (ref 0.1–1.5)
BUN SERPL-MCNC: 10 MG/DL (ref 8–22)
CALCIUM ALBUM COR SERPL-MCNC: 8.5 MG/DL (ref 8.5–10.5)
CALCIUM SERPL-MCNC: 9.3 MG/DL (ref 8.5–10.5)
CHLORIDE SERPL-SCNC: 104 MMOL/L (ref 96–112)
CO2 SERPL-SCNC: 21 MMOL/L (ref 20–33)
CREAT SERPL-MCNC: 0.91 MG/DL (ref 0.5–1.4)
EOSINOPHIL # BLD AUTO: 0.07 K/UL (ref 0–0.51)
EOSINOPHIL NFR BLD: 0.6 % (ref 0–6.9)
ERYTHROCYTE [DISTWIDTH] IN BLOOD BY AUTOMATED COUNT: 39.8 FL (ref 35.9–50)
ETHANOL BLD-MCNC: 266 MG/DL
GFR SERPLBLD CREATININE-BSD FMLA CKD-EPI: 119 ML/MIN/1.73 M 2
GLOBULIN SER CALC-MCNC: 3.5 G/DL (ref 1.9–3.5)
GLUCOSE SERPL-MCNC: 119 MG/DL (ref 65–99)
HCT VFR BLD AUTO: 50.7 % (ref 42–52)
HGB BLD-MCNC: 17.5 G/DL (ref 14–18)
IMM GRANULOCYTES # BLD AUTO: 0.03 K/UL (ref 0–0.11)
IMM GRANULOCYTES NFR BLD AUTO: 0.3 % (ref 0–0.9)
LIPASE SERPL-CCNC: 25 U/L (ref 11–82)
LYMPHOCYTES # BLD AUTO: 2.38 K/UL (ref 1–4.8)
LYMPHOCYTES NFR BLD: 21.9 % (ref 22–41)
MCH RBC QN AUTO: 28.4 PG (ref 27–33)
MCHC RBC AUTO-ENTMCNC: 34.5 G/DL (ref 32.3–36.5)
MCV RBC AUTO: 82.3 FL (ref 81.4–97.8)
MONOCYTES # BLD AUTO: 0.55 K/UL (ref 0–0.85)
MONOCYTES NFR BLD AUTO: 5.1 % (ref 0–13.4)
NEUTROPHILS # BLD AUTO: 7.8 K/UL (ref 1.82–7.42)
NEUTROPHILS NFR BLD: 71.7 % (ref 44–72)
NRBC # BLD AUTO: 0 K/UL
NRBC BLD-RTO: 0 /100 WBC (ref 0–0.2)
PLATELET # BLD AUTO: 377 K/UL (ref 164–446)
PMV BLD AUTO: 9.6 FL (ref 9–12.9)
POTASSIUM SERPL-SCNC: 4 MMOL/L (ref 3.6–5.5)
PROT SERPL-MCNC: 8.5 G/DL (ref 6–8.2)
RBC # BLD AUTO: 6.16 M/UL (ref 4.7–6.1)
SODIUM SERPL-SCNC: 143 MMOL/L (ref 135–145)
WBC # BLD AUTO: 10.9 K/UL (ref 4.8–10.8)

## 2025-06-15 PROCEDURE — 85025 COMPLETE CBC W/AUTO DIFF WBC: CPT

## 2025-06-15 PROCEDURE — 96375 TX/PRO/DX INJ NEW DRUG ADDON: CPT

## 2025-06-15 PROCEDURE — A9270 NON-COVERED ITEM OR SERVICE: HCPCS | Performed by: STUDENT IN AN ORGANIZED HEALTH CARE EDUCATION/TRAINING PROGRAM

## 2025-06-15 PROCEDURE — 36415 COLL VENOUS BLD VENIPUNCTURE: CPT

## 2025-06-15 PROCEDURE — 700111 HCHG RX REV CODE 636 W/ 250 OVERRIDE (IP)

## 2025-06-15 PROCEDURE — 700111 HCHG RX REV CODE 636 W/ 250 OVERRIDE (IP): Performed by: PHYSICIAN ASSISTANT

## 2025-06-15 PROCEDURE — 700102 HCHG RX REV CODE 250 W/ 637 OVERRIDE(OP): Performed by: STUDENT IN AN ORGANIZED HEALTH CARE EDUCATION/TRAINING PROGRAM

## 2025-06-15 PROCEDURE — 96372 THER/PROPH/DIAG INJ SC/IM: CPT

## 2025-06-15 PROCEDURE — 82077 ASSAY SPEC XCP UR&BREATH IA: CPT

## 2025-06-15 PROCEDURE — 83690 ASSAY OF LIPASE: CPT

## 2025-06-15 PROCEDURE — 700111 HCHG RX REV CODE 636 W/ 250 OVERRIDE (IP): Mod: JZ | Performed by: STUDENT IN AN ORGANIZED HEALTH CARE EDUCATION/TRAINING PROGRAM

## 2025-06-15 PROCEDURE — 99285 EMERGENCY DEPT VISIT HI MDM: CPT

## 2025-06-15 PROCEDURE — 700105 HCHG RX REV CODE 258: Performed by: STUDENT IN AN ORGANIZED HEALTH CARE EDUCATION/TRAINING PROGRAM

## 2025-06-15 PROCEDURE — 700102 HCHG RX REV CODE 250 W/ 637 OVERRIDE(OP)

## 2025-06-15 PROCEDURE — 770020 HCHG ROOM/CARE - TELE (206)

## 2025-06-15 PROCEDURE — A9270 NON-COVERED ITEM OR SERVICE: HCPCS

## 2025-06-15 PROCEDURE — 96365 THER/PROPH/DIAG IV INF INIT: CPT

## 2025-06-15 PROCEDURE — 80053 COMPREHEN METABOLIC PANEL: CPT

## 2025-06-15 PROCEDURE — 51798 US URINE CAPACITY MEASURE: CPT

## 2025-06-15 PROCEDURE — 70450 CT HEAD/BRAIN W/O DYE: CPT

## 2025-06-15 PROCEDURE — 96374 THER/PROPH/DIAG INJ IV PUSH: CPT

## 2025-06-15 PROCEDURE — 94760 N-INVAS EAR/PLS OXIMETRY 1: CPT

## 2025-06-15 PROCEDURE — 700111 HCHG RX REV CODE 636 W/ 250 OVERRIDE (IP): Mod: JZ

## 2025-06-15 PROCEDURE — 99223 1ST HOSP IP/OBS HIGH 75: CPT | Performed by: STUDENT IN AN ORGANIZED HEALTH CARE EDUCATION/TRAINING PROGRAM

## 2025-06-15 RX ORDER — ACETAMINOPHEN 325 MG/1
650 TABLET ORAL EVERY 6 HOURS PRN
Status: DISCONTINUED | OUTPATIENT
Start: 2025-06-15 | End: 2025-06-17 | Stop reason: HOSPADM

## 2025-06-15 RX ORDER — SODIUM CHLORIDE 9 MG/ML
1000 INJECTION, SOLUTION INTRAVENOUS ONCE
Status: COMPLETED | OUTPATIENT
Start: 2025-06-15 | End: 2025-06-15

## 2025-06-15 RX ORDER — PROMETHAZINE HYDROCHLORIDE 25 MG/1
12.5-25 SUPPOSITORY RECTAL EVERY 4 HOURS PRN
Status: DISCONTINUED | OUTPATIENT
Start: 2025-06-15 | End: 2025-06-17 | Stop reason: HOSPADM

## 2025-06-15 RX ORDER — PROCHLORPERAZINE EDISYLATE 5 MG/ML
5-10 INJECTION INTRAMUSCULAR; INTRAVENOUS EVERY 4 HOURS PRN
Status: DISCONTINUED | OUTPATIENT
Start: 2025-06-15 | End: 2025-06-17 | Stop reason: HOSPADM

## 2025-06-15 RX ORDER — THIAMINE HYDROCHLORIDE 100 MG/ML
100 INJECTION, SOLUTION INTRAMUSCULAR; INTRAVENOUS DAILY
Status: COMPLETED | OUTPATIENT
Start: 2025-06-15 | End: 2025-06-17

## 2025-06-15 RX ORDER — DIAZEPAM 10 MG/2ML
5 INJECTION, SOLUTION INTRAMUSCULAR; INTRAVENOUS ONCE
Status: COMPLETED | OUTPATIENT
Start: 2025-06-15 | End: 2025-06-15

## 2025-06-15 RX ORDER — PHENOBARBITAL SODIUM 130 MG/ML
260 INJECTION, SOLUTION INTRAMUSCULAR; INTRAVENOUS ONCE
Status: COMPLETED | OUTPATIENT
Start: 2025-06-15 | End: 2025-06-15

## 2025-06-15 RX ORDER — DIAZEPAM 10 MG/2ML
5 INJECTION, SOLUTION INTRAMUSCULAR; INTRAVENOUS
Status: DISCONTINUED | OUTPATIENT
Start: 2025-06-15 | End: 2025-06-17 | Stop reason: HOSPADM

## 2025-06-15 RX ORDER — HYDRALAZINE HYDROCHLORIDE 20 MG/ML
20 INJECTION INTRAMUSCULAR; INTRAVENOUS EVERY 6 HOURS PRN
Status: DISCONTINUED | OUTPATIENT
Start: 2025-06-15 | End: 2025-06-17 | Stop reason: HOSPADM

## 2025-06-15 RX ORDER — POLYETHYLENE GLYCOL 3350 17 G/17G
1 POWDER, FOR SOLUTION ORAL
Status: DISCONTINUED | OUTPATIENT
Start: 2025-06-15 | End: 2025-06-17 | Stop reason: HOSPADM

## 2025-06-15 RX ORDER — OXYCODONE HYDROCHLORIDE 5 MG/1
2.5 TABLET ORAL
Refills: 0 | Status: DISCONTINUED | OUTPATIENT
Start: 2025-06-15 | End: 2025-06-17 | Stop reason: HOSPADM

## 2025-06-15 RX ORDER — LORAZEPAM 0.5 MG/1
0.5 TABLET ORAL EVERY 4 HOURS PRN
Status: DISCONTINUED | OUTPATIENT
Start: 2025-06-15 | End: 2025-06-17 | Stop reason: HOSPADM

## 2025-06-15 RX ORDER — ESCITALOPRAM OXALATE 20 MG/1
20 TABLET ORAL DAILY
COMMUNITY

## 2025-06-15 RX ORDER — SODIUM CHLORIDE 9 MG/ML
INJECTION, SOLUTION INTRAVENOUS CONTINUOUS
Status: DISCONTINUED | OUTPATIENT
Start: 2025-06-15 | End: 2025-06-17 | Stop reason: HOSPADM

## 2025-06-15 RX ORDER — HYDROXYZINE HYDROCHLORIDE 50 MG/1
50 TABLET, FILM COATED ORAL 3 TIMES DAILY PRN
COMMUNITY
Start: 2025-06-13 | End: 2025-07-13

## 2025-06-15 RX ORDER — HALOPERIDOL 5 MG/ML
5 INJECTION INTRAMUSCULAR EVERY 4 HOURS PRN
Status: DISCONTINUED | OUTPATIENT
Start: 2025-06-15 | End: 2025-06-17 | Stop reason: HOSPADM

## 2025-06-15 RX ORDER — DIAZEPAM 10 MG/2ML
10 INJECTION, SOLUTION INTRAMUSCULAR; INTRAVENOUS
Status: DISCONTINUED | OUTPATIENT
Start: 2025-06-15 | End: 2025-06-17 | Stop reason: HOSPADM

## 2025-06-15 RX ORDER — LORAZEPAM 1 MG/1
1 TABLET ORAL EVERY 4 HOURS PRN
Status: DISCONTINUED | OUTPATIENT
Start: 2025-06-15 | End: 2025-06-17 | Stop reason: HOSPADM

## 2025-06-15 RX ORDER — OXYCODONE HYDROCHLORIDE 5 MG/1
5 TABLET ORAL
Refills: 0 | Status: DISCONTINUED | OUTPATIENT
Start: 2025-06-15 | End: 2025-06-17 | Stop reason: HOSPADM

## 2025-06-15 RX ORDER — MORPHINE SULFATE 4 MG/ML
4 INJECTION INTRAVENOUS ONCE
Status: COMPLETED | OUTPATIENT
Start: 2025-06-15 | End: 2025-06-15

## 2025-06-15 RX ORDER — PROMETHAZINE HYDROCHLORIDE 25 MG/1
12.5-25 TABLET ORAL EVERY 4 HOURS PRN
Status: DISCONTINUED | OUTPATIENT
Start: 2025-06-15 | End: 2025-06-17 | Stop reason: HOSPADM

## 2025-06-15 RX ORDER — FAMOTIDINE 20 MG/1
20 TABLET, FILM COATED ORAL DAILY
COMMUNITY
Start: 2025-06-13 | End: 2025-07-13

## 2025-06-15 RX ORDER — PANTOPRAZOLE SODIUM 40 MG/10ML
40 INJECTION, POWDER, LYOPHILIZED, FOR SOLUTION INTRAVENOUS ONCE
Status: COMPLETED | OUTPATIENT
Start: 2025-06-15 | End: 2025-06-15

## 2025-06-15 RX ORDER — ONDANSETRON 2 MG/ML
4 INJECTION INTRAMUSCULAR; INTRAVENOUS ONCE
Status: COMPLETED | OUTPATIENT
Start: 2025-06-15 | End: 2025-06-15

## 2025-06-15 RX ORDER — LABETALOL HYDROCHLORIDE 5 MG/ML
10 INJECTION, SOLUTION INTRAVENOUS EVERY 4 HOURS PRN
Status: DISCONTINUED | OUTPATIENT
Start: 2025-06-15 | End: 2025-06-15

## 2025-06-15 RX ORDER — LORAZEPAM 2 MG/1
2 TABLET ORAL
Status: DISCONTINUED | OUTPATIENT
Start: 2025-06-15 | End: 2025-06-17 | Stop reason: HOSPADM

## 2025-06-15 RX ORDER — ONDANSETRON 2 MG/ML
4 INJECTION INTRAMUSCULAR; INTRAVENOUS EVERY 4 HOURS PRN
Status: DISCONTINUED | OUTPATIENT
Start: 2025-06-15 | End: 2025-06-17 | Stop reason: HOSPADM

## 2025-06-15 RX ORDER — ONDANSETRON 4 MG/1
4 TABLET, ORALLY DISINTEGRATING ORAL EVERY 4 HOURS PRN
Status: DISCONTINUED | OUTPATIENT
Start: 2025-06-15 | End: 2025-06-17 | Stop reason: HOSPADM

## 2025-06-15 RX ORDER — LORAZEPAM 2 MG/1
4 TABLET ORAL
Status: DISCONTINUED | OUTPATIENT
Start: 2025-06-15 | End: 2025-06-17 | Stop reason: HOSPADM

## 2025-06-15 RX ORDER — AMOXICILLIN 250 MG
2 CAPSULE ORAL EVERY EVENING
Status: DISCONTINUED | OUTPATIENT
Start: 2025-06-15 | End: 2025-06-17 | Stop reason: HOSPADM

## 2025-06-15 RX ORDER — DIAZEPAM 10 MG/2ML
INJECTION, SOLUTION INTRAMUSCULAR; INTRAVENOUS
Status: COMPLETED
Start: 2025-06-15 | End: 2025-06-15

## 2025-06-15 RX ORDER — ONDANSETRON 4 MG/1
4 TABLET, ORALLY DISINTEGRATING ORAL EVERY 8 HOURS PRN
COMMUNITY
Start: 2025-06-13 | End: 2025-06-18

## 2025-06-15 RX ORDER — ENOXAPARIN SODIUM 100 MG/ML
40 INJECTION SUBCUTANEOUS DAILY
Status: DISCONTINUED | OUTPATIENT
Start: 2025-06-15 | End: 2025-06-17 | Stop reason: HOSPADM

## 2025-06-15 RX ORDER — HYDROMORPHONE HYDROCHLORIDE 1 MG/ML
0.25 INJECTION, SOLUTION INTRAMUSCULAR; INTRAVENOUS; SUBCUTANEOUS
Status: DISCONTINUED | OUTPATIENT
Start: 2025-06-15 | End: 2025-06-17 | Stop reason: HOSPADM

## 2025-06-15 RX ADMIN — LORAZEPAM 3 MG: 2 TABLET ORAL at 15:38

## 2025-06-15 RX ADMIN — FAMOTIDINE 20 MG: 10 INJECTION, SOLUTION INTRAVENOUS at 02:34

## 2025-06-15 RX ADMIN — DIAZEPAM 5 MG: 10 INJECTION, SOLUTION INTRAMUSCULAR; INTRAVENOUS at 04:54

## 2025-06-15 RX ADMIN — ONDANSETRON 4 MG: 2 INJECTION INTRAMUSCULAR; INTRAVENOUS at 02:29

## 2025-06-15 RX ADMIN — SODIUM CHLORIDE 1000 ML: 9 INJECTION, SOLUTION INTRAVENOUS at 01:58

## 2025-06-15 RX ADMIN — ONDANSETRON 4 MG: 2 INJECTION INTRAMUSCULAR; INTRAVENOUS at 10:42

## 2025-06-15 RX ADMIN — OXYCODONE 5 MG: 5 TABLET ORAL at 10:42

## 2025-06-15 RX ADMIN — DIAZEPAM 5 MG: 10 INJECTION, SOLUTION INTRAMUSCULAR; INTRAVENOUS at 01:55

## 2025-06-15 RX ADMIN — THIAMINE HYDROCHLORIDE 100 MG: 100 INJECTION, SOLUTION INTRAMUSCULAR; INTRAVENOUS at 04:07

## 2025-06-15 RX ADMIN — DOCUSATE SODIUM 50 MG AND SENNOSIDES 8.6 MG 2 TABLET: 8.6; 5 TABLET, FILM COATED ORAL at 17:08

## 2025-06-15 RX ADMIN — SODIUM CHLORIDE: 9 INJECTION, SOLUTION INTRAVENOUS at 15:01

## 2025-06-15 RX ADMIN — LORAZEPAM 3 MG: 2 TABLET ORAL at 13:11

## 2025-06-15 RX ADMIN — OXYCODONE 5 MG: 5 TABLET ORAL at 15:38

## 2025-06-15 RX ADMIN — PHENOBARBITAL SODIUM 260 MG: 130 INJECTION INTRAMUSCULAR; INTRAVENOUS at 02:29

## 2025-06-15 RX ADMIN — HALOPERIDOL LACTATE 5 MG: 5 INJECTION, SOLUTION INTRAMUSCULAR at 13:11

## 2025-06-15 RX ADMIN — ENOXAPARIN SODIUM 40 MG: 100 INJECTION SUBCUTANEOUS at 04:01

## 2025-06-15 RX ADMIN — ENOXAPARIN SODIUM 40 MG: 100 INJECTION SUBCUTANEOUS at 17:08

## 2025-06-15 RX ADMIN — PANTOPRAZOLE SODIUM 40 MG: 40 INJECTION, POWDER, FOR SOLUTION INTRAVENOUS at 03:07

## 2025-06-15 RX ADMIN — LORAZEPAM 3 MG: 2 TABLET ORAL at 10:42

## 2025-06-15 RX ADMIN — OXYCODONE 5 MG: 5 TABLET ORAL at 20:05

## 2025-06-15 RX ADMIN — SODIUM CHLORIDE: 9 INJECTION, SOLUTION INTRAVENOUS at 05:52

## 2025-06-15 RX ADMIN — LORAZEPAM 3 MG: 2 TABLET ORAL at 20:04

## 2025-06-15 RX ADMIN — MORPHINE SULFATE 4 MG: 4 INJECTION INTRAVENOUS at 02:34

## 2025-06-15 RX ADMIN — FOLIC ACID 1 MG: 5 INJECTION, SOLUTION INTRAMUSCULAR; INTRAVENOUS; SUBCUTANEOUS at 04:05

## 2025-06-15 ASSESSMENT — LIFESTYLE VARIABLES
AUDITORY DISTURBANCES: NOT PRESENT
VISUAL DISTURBANCES: MILD SENSITIVITY
ORIENTATION AND CLOUDING OF SENSORIUM: ORIENTED AND CAN DO SERIAL ADDITIONS
ANXIETY: MODERATELY ANXIOUS OR GUARDED, SO ANXIETY IS INFERRED
AUDITORY DISTURBANCES: NOT PRESENT
TREMOR: MODERATE TREMOR WITH ARMS EXTENDED
ORIENTATION AND CLOUDING OF SENSORIUM: ORIENTED AND CAN DO SERIAL ADDITIONS
PAROXYSMAL SWEATS: BEADS OF SWEAT OBVIOUS ON FOREHEAD
NAUSEA AND VOMITING: *
TOTAL SCORE: MODERATE ITCHING, PINS AND NEEDLES SENSATION, BURNING OR NUMBNESS
PAROXYSMAL SWEATS: BARELY PERCEPTIBLE SWEATING. PALMS MOIST
HEADACHE, FULLNESS IN HEAD: NOT PRESENT
NAUSEA AND VOMITING: *
ANXIETY: NO ANXIETY (AT EASE)
TREMOR: NO TREMOR
ANXIETY: MODERATELY ANXIOUS OR GUARDED, SO ANXIETY IS INFERRED
TREMOR: MODERATE TREMOR WITH ARMS EXTENDED
VISUAL DISTURBANCES: VERY MILD SENSITIVITY
AUDITORY DISTURBANCES: NOT PRESENT
ANXIETY: NO ANXIETY (AT EASE)
TREMOR: MODERATE TREMOR WITH ARMS EXTENDED
TOTAL SCORE: MODERATE ITCHING, PINS AND NEEDLES SENSATION, BURNING OR NUMBNESS
HEADACHE, FULLNESS IN HEAD: MODERATELY SEVERE
TOTAL SCORE: 10
ORIENTATION AND CLOUDING OF SENSORIUM: ORIENTED AND CAN DO SERIAL ADDITIONS
TOTAL SCORE: 25
TOTAL SCORE: MODERATE ITCHING, PINS AND NEEDLES SENSATION, BURNING OR NUMBNESS
ORIENTATION AND CLOUDING OF SENSORIUM: ORIENTED AND CAN DO SERIAL ADDITIONS
TOTAL SCORE: 0
TOTAL SCORE: 21
TOTAL SCORE: MODERATE ITCHING, PINS AND NEEDLES SENSATION, BURNING OR NUMBNESS
AUDITORY DISTURBANCES: NOT PRESENT
TOTAL SCORE: VERY MILD ITCHING, PINS AND NEEDLES SENSATION, BURNING OR NUMBNESS
AGITATION: NORMAL ACTIVITY
PAROXYSMAL SWEATS: BARELY PERCEPTIBLE SWEATING. PALMS MOIST
ORIENTATION AND CLOUDING OF SENSORIUM: ORIENTED AND CAN DO SERIAL ADDITIONS
AGITATION: SOMEWHAT MORE THAN NORMAL ACTIVITY
VISUAL DISTURBANCES: NOT PRESENT
HEADACHE, FULLNESS IN HEAD: MODERATELY SEVERE
AGITATION: NORMAL ACTIVITY
PAROXYSMAL SWEATS: BARELY PERCEPTIBLE SWEATING. PALMS MOIST
TREMOR: *
NAUSEA AND VOMITING: *
PAROXYSMAL SWEATS: NO SWEAT VISIBLE
AGITATION: SOMEWHAT MORE THAN NORMAL ACTIVITY
AUDITORY DISTURBANCES: NOT PRESENT
TOTAL SCORE: 25
AUDITORY DISTURBANCES: NOT PRESENT
AGITATION: NORMAL ACTIVITY
ORIENTATION AND CLOUDING OF SENSORIUM: ORIENTED AND CAN DO SERIAL ADDITIONS
TOTAL SCORE: 21
ANXIETY: MODERATELY ANXIOUS OR GUARDED, SO ANXIETY IS INFERRED
HEADACHE, FULLNESS IN HEAD: MILD
AUDITORY DISTURBANCES: NOT PRESENT
NAUSEA AND VOMITING: *
HEADACHE, FULLNESS IN HEAD: MODERATELY SEVERE
NAUSEA AND VOMITING: NO NAUSEA AND NO VOMITING
PAROXYSMAL SWEATS: *
TOTAL SCORE: 24
ANXIETY: MILDLY ANXIOUS
ANXIETY: MODERATELY ANXIOUS OR GUARDED, SO ANXIETY IS INFERRED
NAUSEA AND VOMITING: *
VISUAL DISTURBANCES: NOT PRESENT
AGITATION: SOMEWHAT MORE THAN NORMAL ACTIVITY
AGITATION: SOMEWHAT MORE THAN NORMAL ACTIVITY
PAROXYSMAL SWEATS: *
TREMOR: NO TREMOR
PAROXYSMAL SWEATS: *
TREMOR: MODERATE TREMOR WITH ARMS EXTENDED
ORIENTATION AND CLOUDING OF SENSORIUM: ORIENTED AND CAN DO SERIAL ADDITIONS
ORIENTATION AND CLOUDING OF SENSORIUM: ORIENTED AND CAN DO SERIAL ADDITIONS
VISUAL DISTURBANCES: NOT PRESENT
ANXIETY: MODERATELY ANXIOUS OR GUARDED, SO ANXIETY IS INFERRED
TOTAL SCORE: 0
TREMOR: SEVERE TREMOR, EVEN WITH ARMS NOT EXTENDED
AUDITORY DISTURBANCES: NOT PRESENT
ORIENTATION AND CLOUDING OF SENSORIUM: ORIENTED AND CAN DO SERIAL ADDITIONS
VISUAL DISTURBANCES: MILD SENSITIVITY
HEADACHE, FULLNESS IN HEAD: NOT PRESENT
HEADACHE, FULLNESS IN HEAD: MODERATELY SEVERE
HEADACHE, FULLNESS IN HEAD: MODERATELY SEVERE
AUDITORY DISTURBANCES: NOT PRESENT
VISUAL DISTURBANCES: MILD SENSITIVITY
AGITATION: SOMEWHAT MORE THAN NORMAL ACTIVITY
NAUSEA AND VOMITING: NO NAUSEA AND NO VOMITING
TOTAL SCORE: MODERATE ITCHING, PINS AND NEEDLES SENSATION, BURNING OR NUMBNESS
TREMOR: MODERATE TREMOR WITH ARMS EXTENDED
VISUAL DISTURBANCES: NOT PRESENT
NAUSEA AND VOMITING: MILD NAUSEA WITH NO VOMITING
VISUAL DISTURBANCES: NOT PRESENT
PAROXYSMAL SWEATS: NO SWEAT VISIBLE
HEADACHE, FULLNESS IN HEAD: MODERATE
ANXIETY: NO ANXIETY (AT EASE)
NAUSEA AND VOMITING: *
AGITATION: NORMAL ACTIVITY
TOTAL SCORE: 10

## 2025-06-15 ASSESSMENT — ENCOUNTER SYMPTOMS
CHILLS: 0
ABDOMINAL PAIN: 1
HEADACHES: 0
SHORTNESS OF BREATH: 0
FEVER: 0
COUGH: 0
SEIZURES: 1
NAUSEA: 1
DIZZINESS: 0
HEARTBURN: 0
PALPITATIONS: 0
VOMITING: 1
DIARRHEA: 0

## 2025-06-15 ASSESSMENT — PAIN DESCRIPTION - PAIN TYPE
TYPE: ACUTE PAIN

## 2025-06-15 ASSESSMENT — FIBROSIS 4 INDEX: FIB4 SCORE: 0.54

## 2025-06-15 NOTE — PROGRESS NOTES
4 Eyes Skin Assessment Completed by RADHA Castillo and RADHA Kirkland.    Skin assessment is primarily focused on high risk bony prominences. Pay special attention to skin beneath and around medical devices, high risk bony prominences, skin to skin areas and areas where the patient lacks sensation to feel pain and areas where the patient previously had breakdown.     Head (Occipital):  WDL   Ears (Under Medical Devices): WDL   Nose (Under Medical Devices): WDL   Mouth:  WDL   Neck: WDL   Breast/Chest:  WDL   Shoulder Blades:  WDL   Spine:   WDL   (R) Arm/Elbow/Hand: WDL   (L) Arm/Elbow/Hand: WDL   Abdomen: WDL   Pannus/Groin:  WDL   Sacrum/Coccyx:   WDL   (R) Ischial Tuberosity (Sit Bones):  WDL   (L) Ischial Tuberosity (Sit Bones):  WDL   (R) Leg:  Red and Blanching   (L) Leg:  Red and Blanching   (R) Heel:  WDL   (R) Foot/Toe: WDL   (L) Heel: WDL   (L) Foot/Toe:  WDL       DEVICES IN USE:   Respiratory Devices:  Nasal cannula and Pulse ox  Feeding Devices:  N/A   Lines & BP Monitoring Devices:  Peripheral IV and BP cuff    Orthopedic Devices:  N/A  Miscellaneous Devices:  N/A    PROTOCOL INTERVENTIONS:   Standard/Trauma Bed:  Already in place  Nasal Cannula with Gray Foams:  Already in place    WOUND PHOTOS:   N/A no wounds identified    WOUND CONSULT:   N/A, no advanced wound care needs identified

## 2025-06-15 NOTE — PROGRESS NOTES
[facility] Medicine Daily Progress Note    Date of Service  6/15/2025    Chief Complaint  Praful Carmen is a 25 y.o. male admitted 6/15/2025 with etoh withdrawal.    Hospital Course  Praful Carmen is a 25 y.o. male who presented 6/15/2025 with seizures.  Patient has a history of alcohol use, drinks about 10 shots a day.  History limited as patient sedated at bedside.  History taken by chart review.  Patient was noted to have a tonic-clonic seizure, witnessed by his neighbor.  He had been complaining of epigastric pain, nausea, vomiting.  He continues to drink.  He was brought to the ER for further evaluation.     In ER, patient found to have normal vital signs.  Alcohol level 266.  CT head negative.  Lipase 26.  He had another seizure episode in the ER.  Patient admitted for alcohol withdrawal seizures.    Interval Problem Update  6/14 Afebrile, vitals are stable and on 2 L nasal canula. Continue CIWA protocol and IV fluids.  Monitor for seizures. Added IV haldol for agitation if needed.    I have discussed this patient's plan of care and discharge plan at IDT rounds today with Case Management, Nursing, Nursing leadership, and other members of the IDT team.    Consultants/Specialty  none    Code Status  Full Code    Disposition  The patient is not medically cleared for discharge to home or a post-acute facility.  Anticipate discharge to: home with close outpatient follow-up    I have placed the appropriate orders for post-discharge needs.    Review of Systems  Review of Systems   Constitutional:  Negative for chills, fever and malaise/fatigue.   Respiratory:  Negative for cough and shortness of breath.    Cardiovascular:  Negative for chest pain, palpitations and leg swelling.   Gastrointestinal:  Positive for abdominal pain, nausea and vomiting. Negative for diarrhea and heartburn.   Genitourinary:  Negative for dysuria, frequency and urgency.   Neurological:  Positive for seizures. Negative for  dizziness and headaches.   All other systems reviewed and are negative.       Physical Exam  Temp:  [36.2 °C (97.1 °F)-36.4 °C (97.6 °F)] 36.4 °C (97.6 °F)  Pulse:  [67-92] 67  Resp:  [13-18] 14  BP: (103-123)/(57-80) 108/70  SpO2:  [93 %-98 %] 98 %    Physical Exam  Vitals and nursing note reviewed.   Constitutional:       Appearance: Normal appearance. He is not ill-appearing.   HENT:      Head: Normocephalic and atraumatic.      Jaw: There is normal jaw occlusion.      Right Ear: Hearing normal.      Left Ear: Hearing normal.      Nose: Nose normal.      Mouth/Throat:      Lips: Pink.      Mouth: Mucous membranes are moist.   Eyes:      Extraocular Movements: Extraocular movements intact.      Conjunctiva/sclera: Conjunctivae normal.      Pupils: Pupils are equal, round, and reactive to light.   Neck:      Vascular: No carotid bruit.   Cardiovascular:      Rate and Rhythm: Normal rate and regular rhythm.      Pulses: Normal pulses.      Heart sounds: Normal heart sounds, S1 normal and S2 normal.   Pulmonary:      Effort: Pulmonary effort is normal.      Breath sounds: Normal breath sounds and air entry. No stridor.   Musculoskeletal:      Cervical back: Normal range of motion and neck supple.      Right lower leg: No edema.      Left lower leg: No edema.   Skin:     General: Skin is warm and dry.      Capillary Refill: Capillary refill takes less than 2 seconds.   Neurological:      General: No focal deficit present.      Mental Status: He is alert and oriented to person, place, and time. Mental status is at baseline.      Sensory: Sensation is intact.      Motor: Motor function is intact.   Psychiatric:         Attention and Perception: Attention and perception normal.         Mood and Affect: Mood and affect normal.         Speech: Speech normal.         Behavior: Behavior normal. Behavior is cooperative.         Fluids  No intake or output data in the 24 hours ending 06/15/25 0804     Laboratory  Recent Labs      06/13/25  0426 06/15/25  0145   WBC 6.30 10.9*   RBC 5.16 6.16*   HEMOGLOBIN 14.9 17.5   HEMATOCRIT 43.1 50.7   MCV 83.4 82.3   MCH 28.9 28.4   MCHC 34.7 34.5   RDW 14.0 39.8   PLATELETCT 267 377   MPV 7.9 9.6     Recent Labs     06/12/25  0848 06/13/25  0426 06/15/25  0145   SODIUM 138 137 143   POTASSIUM 4.2 4.9 4.0   CHLORIDE 103 106 104   CO2 26.0 26.0 21   GLUCOSE 90 89 119*   BUN 7.0 12.0 10   CREATININE 0.80 0.90 0.91   CALCIUM 9.6 9.7 9.3                   Imaging  CT-HEAD W/O   Final Result      No acute intracranial findings.                    Assessment/Plan  * Seizure due to alcohol withdrawal, uncomplicated (HCC)- (present on admission)  Assessment & Plan  Patient noted to have 2 seizures today, alcohol level 266, likely alcohol withdrawal seizure  CT head negative  CIWA protocol initiated, monitor for DTs  IV thiamine, IV folic acid  Fluids  Seizure precautions    Drug abuse (HCC)- (present on admission)  Assessment & Plan  Send U-Tox  In the past, has been positive for cocaine, cannabis, and opiates, benzodiazepines  Avoid beta-blockers for now until negative urine drug screen result         VTE prophylaxis:   SCDs/TEDs   enoxaparin ppx      I have performed a physical exam and reviewed and updated ROS and Plan today (6/15/2025). In review of yesterday's note (6/14/2025), there are no changes except as documented above.

## 2025-06-15 NOTE — PROGRESS NOTES
I will be off duty and signed out of voalte at 3pm.  If you have any needs for this patient after 3pm, please reach out to the on call Oumou CORTEZ.  Thank you!

## 2025-06-15 NOTE — H&P
Hospital Medicine History & Physical Note    Date of Service  6/15/2025    Primary Care Physician  Pcp Pt States None    Consultants  None    Code Status  Full Code    Chief Complaint  Chief Complaint   Patient presents with    Seizure     Neighbor called for pt, pt had a seizure, stated that neighbor noticed pt was shaking really bad while sitting down Per EMS pt found on ground of parking lot. Pt is detoxing from alcohol, last drink yesterday - would drink 10 shots/day for 3 years. Last detox 1 month ago, states he had seizures. GCS 15.     Abdominal Pain     Pt c/o mid abd pain, 10/10 stabbing pain. Started after seizure today. +n/v        History of Presenting Illness  Praful Carmen is a 25 y.o. male who presented 6/15/2025 with seizures.  Patient has a history of alcohol use, drinks about 10 shots a day.  History limited as patient sedated at bedside.  History taken by chart review.  Patient was noted to have a tonic-clonic seizure, witnessed by his neighbor.  He had been complaining of epigastric pain, nausea, vomiting.  He continues to drink.  He was brought to the ER for further evaluation.    In ER, patient found to have normal vital signs.  Alcohol level 266.  CT head negative.  Lipase 26.  He had another seizure episode in the ER.  Patient admitted for alcohol withdrawal seizures.    I discussed the plan of care with patient.    Review of Systems  ROS  Sedated  Past Medical History   has no past medical history on file.    Surgical History   has a past surgical history that includes submandible abscess incision and drainage (Left, 6/7/2017).     Family History  History reviewed. No pertinent family history.     Family history reviewed with patient. There is no family history that is pertinent to the chief complaint.     Social History   reports that he has been smoking cigarettes. He has been exposed to tobacco smoke. His smokeless tobacco use includes chew. He reports that he does not currently  use alcohol. He reports current drug use. Drug: Inhaled.    Allergies  Allergies[1]    Medications  Prior to Admission Medications   Prescriptions Last Dose Informant Patient Reported? Taking?   folic acid (FOLVITE) 1 MG Tab   No No   Sig: Take 1 Tablet by mouth every day for 90 days.   multivitamin Tab   No No   Sig: Take 1 Tablet by mouth every day for 90 days.   thiamine (THIAMINE) 100 MG tablet   No No   Sig: Take 1 Tablet by mouth every day for 90 days.      Facility-Administered Medications: None       Physical Exam  Temp:  [36.2 °C (97.1 °F)] 36.2 °C (97.1 °F)  Pulse:  [85-92] 85  Resp:  [18] 18  BP: (114-123)/(57-80) 114/57  SpO2:  [95 %-97 %] 97 %  Blood Pressure: 114/57   Temperature: 36.2 °C (97.1 °F)   Pulse: 85   Respiration: 18   Pulse Oximetry: 97 %       Physical Exam  Constitutional:       Appearance: Normal appearance. He is normal weight.      Comments: Sedated   HENT:      Head: Normocephalic.      Nose: Nose normal.      Mouth/Throat:      Mouth: Mucous membranes are moist.   Eyes:      Pupils: Pupils are equal, round, and reactive to light.   Cardiovascular:      Rate and Rhythm: Normal rate and regular rhythm.      Pulses: Normal pulses.   Pulmonary:      Effort: Pulmonary effort is normal.      Breath sounds: Normal breath sounds.   Abdominal:      General: Abdomen is flat. Bowel sounds are normal.      Palpations: Abdomen is soft.   Musculoskeletal:         General: Normal range of motion.      Cervical back: Neck supple.   Skin:     General: Skin is warm.   Neurological:      Mental Status: He is disoriented.         Laboratory:  Recent Labs     06/13/25  0426 06/15/25  0145   WBC 6.30 10.9*   RBC 5.16 6.16*   HEMOGLOBIN 14.9 17.5   HEMATOCRIT 43.1 50.7   MCV 83.4 82.3   MCH 28.9 28.4   MCHC 34.7 34.5   RDW 14.0 39.8   PLATELETCT 267 377   MPV 7.9 9.6     Recent Labs     06/12/25  0848 06/13/25  0426 06/15/25  0145   SODIUM 138 137 143   POTASSIUM 4.2 4.9 4.0   CHLORIDE 103 106 104   CO2  "26.0 26.0 21   GLUCOSE 90 89 119*   BUN 7.0 12.0 10   CREATININE 0.80 0.90 0.91   CALCIUM 9.6 9.7 9.3     Recent Labs     06/12/25  0848 06/13/25  0426 06/15/25  0145   ALTSGPT  --  42 46   ASTSGOT  --  36 38   ALKPHOSPHAT  --  84 105*   TBILIRUBIN  --  0.4 0.4   LIPASE  --   --  25   GLUCOSE 90 89 119*         No results for input(s): \"NTPROBNP\" in the last 72 hours.      No results for input(s): \"TROPONINT\" in the last 72 hours.    Imaging:  CT-HEAD W/O   Final Result      No acute intracranial findings.                   no X-Ray or EKG requiring interpretation    Assessment/Plan:  Justification for Admission Status  I anticipate this patient will require at least two midnights for appropriate medical management, necessitating inpatient admission because patient has alcohol withdrawal seizure    Patient will need a Telemetry bed on MEDICAL service .  The need is secondary to alcohol withdrawal seizure.    * Seizure due to alcohol withdrawal, uncomplicated (HCC)- (present on admission)  Assessment & Plan  Patient noted to have 2 seizures today, alcohol level 266, likely alcohol withdrawal seizure  CT head negative  CIWA protocol initiated, monitor for DTs  IV thiamine, IV folic acid  Fluids  Seizure precautions    Drug abuse (HCC)- (present on admission)  Assessment & Plan  Send U-Tox  In the past, has been positive for cocaine, cannabis, and opiates, benzodiazepines  Avoid beta-blockers for now until negative urine drug screen result        VTE prophylaxis: enoxaparin ppx       [1] No Known Allergies    "

## 2025-06-15 NOTE — ED NOTES
"Pt had a witnessed seizure in [facility] by this RN for appx 45 seconds, HR in >110, spo2 <90% during episode. Pt laid to side, airway and head protected, ERP at bedside, pt administered 10 mg valium IVP. Pt postictal after, asking \"what happened.\"   "

## 2025-06-15 NOTE — ED NOTES
Med Rec completed per previous admission on 06/10/2025. Patient not able to participate in med rec at this time.      Allergies reviewed: yes     Oral antibiotics in the past 30 days: no    Anticoagulant in past 14 days: no    Dispense history available in [facility]: no    Pharmacy patient utilizes: [facility]   [phone]

## 2025-06-15 NOTE — ED TRIAGE NOTES
"Chief Complaint   Patient presents with    Seizure     Neighbor called for pt, pt had a seizure, stated that neighbor noticed pt was shaking really bad while sitting down Per EMS pt found on ground of parking lot. Pt is detoxing from alcohol, last drink yesterday - would drink 10 shots/day for 3 years. Last detox 1 month ago, states he had seizures. GCS 15.     Abdominal Pain     Pt c/o mid abd pain, 10/10 stabbing pain. Started after seizure today. +n/v        BIB [facility] to [address], pt on monitor and in gown, labs drawn and sent. Pt consists of: above complaint, A&Ox4, on room air.     Medications given en route: 2 mg versed, 4 mg zofran     /67   Pulse 92   Temp 36.2 °C (97.1 °F) (Temporal)   Resp 18   Ht 1.702 m (5' 7\")   Wt 72.6 kg (160 lb)   SpO2 95%   BMI 25.06 kg/m²     "

## 2025-06-15 NOTE — HOSPITAL COURSE
Praful Carmen is a 25 y.o. male who presented 6/15/2025 with seizures.  Patient has a history of alcohol use, drinks about 10 shots a day.  History limited as patient sedated at bedside.  History taken by chart review.  Patient was noted to have a tonic-clonic seizure, witnessed by his neighbor.  He had been complaining of epigastric pain, nausea, vomiting.  He continues to drink.  He was brought to the ER for further evaluation.     In ER, patient found to have normal vital signs.  Alcohol level 266.  CT head negative.  Lipase 26.  He had another seizure episode in the ER.  Patient admitted for alcohol withdrawal seizures.    This is  a 25-year-old male with no significant past medical history other than alcohol use, triptans as a day with negative tonic-clonic seizure as witnessed by neighbor.  Saturday complaining of nausea, vomiting, epigastric pain.    In ER, patient blood alcohol noted to be 260s, CT  head negative, lipase 26.  During the stay in the hospital, he was a transfer protocol for monitoring alcohol withdrawal.    Interval events:  --patient is alert, awake, answering questions appropriately, he has been complaining of abdominal pain, heartburn, provided Pepcid along with GI cocktail, --continue to give IV fluid, show protocol for monitoring alcohol withdrawal including p.o. thiamine and folic acid.  --Noted to be clinically dehydrated, will provide 1 L bolus  --Currently patient had tonic-clonic seizure while his blood work a level being 260, will obtain EEG    I reached out to neurology because I am concerned about having tonic-clonic seizure with his proclivity to 6

## 2025-06-15 NOTE — PROGRESS NOTES
Pt experienced seizure-like activity starting at 0441 and lasting approximately 4 minutes. Hospitalist was notified, came to bedside, and IV valium 5mg was given. Pt had a second seizure-like activity at 0502 lasting approximately 2.5 minutes. Oxygen applied, on continuous pulse ox, and fluids started. Patient currently resting. Belongings and call light within reach

## 2025-06-15 NOTE — PROGRESS NOTES
NOC RADHA CROSS COVER NOTE    Informed by bedside RN that patient is having seizure-like activity. Patient is currently withdrawing from alcohol. Patient was just brought up to the room from the ED and nurses were performing a skin check when he began to shake and continuously move back and forth for the last 7 min. Patient alert and oriented to person, place and time. Patient stated he was hot which is why he was shaking. He remained alert and oriented during his shaking and movements; unlikely patient is having a seizure at this time. 5mg valium given due to significant withdrawal symptoms and agitation.

## 2025-06-15 NOTE — ED PROVIDER NOTES
ED Provider Note    CHIEF COMPLAINT  Chief Complaint   Patient presents with    Seizure     Neighbor called for pt, pt had a seizure, stated that neighbor noticed pt was shaking really bad while sitting down Per EMS pt found on ground of parking lot. Pt is detoxing from alcohol, last drink yesterday - would drink 10 shots/day for 3 years. Last detox 1 month ago, states he had seizures. GCS 15.     Abdominal Pain     Pt c/o mid abd pain, 10/10 stabbing pain. Started after seizure today. +n/v        EXTERNAL RECORDS REVIEWED  Patient previously admitted at [facility] for alcohol withdrawal, epigastric pain    HPI/ROS  LIMITATION TO HISTORY   none  OUTSIDE HISTORIAN(S):  EMS, report administration of Versed prior to arrival    Praful Carmen is a 25 y.o. male who presents evaluation of seizure.  Patient brought in by EMS.  He was found on ground of a parking lot.  The neighbor had called as patient had had a witnessed seizure described as shaking.  He is detoxing from alcohol his last drink was yesterday.  He does have a history of alcohol withdrawal seizures.  He typically drinks about 10 shots of hard alcohol daily.  Patient complaining of associated epigastric abdominal pain with associated nausea and vomiting.  Symptoms started this evening.  Pain is 10 out of 10, 'stabbing' in nature. No fevers.  No urinary symptoms     PAST MEDICAL HISTORY   Denies    SURGICAL HISTORY   has a past surgical history that includes submandible abscess incision and drainage (Left, 6/7/2017).    FAMILY HISTORY  History reviewed. No pertinent family history.    SOCIAL HISTORY  Social History     Tobacco Use    Smoking status: Some Days     Types: Cigarettes     Passive exposure: Yes    Smokeless tobacco: Current     Types: Chew   Vaping Use    Vaping status: Every Day    Substances: Nicotine, THC   Substance and Sexual Activity    Alcohol use: Not Currently     Comment: ocassionally    Drug use: Yes     Types: Inhaled      "Comment: marijuana    Sexual activity: Not on file       CURRENT MEDICATIONS  Home Medications       Reviewed by Stefany Mari R.N. (Registered Nurse) on 06/15/25 at 0147  Med List Status: Partial     Medication Last Dose Status   folic acid (FOLVITE) 1 MG Tab  Active   multivitamin Tab  Active   thiamine (THIAMINE) 100 MG tablet  Active                  Audit from Redirected Encounters    **Home medications have not yet been reviewed for this encounter**         ALLERGIES  Allergies[1]    PHYSICAL EXAM  VITAL SIGNS: /58   Pulse 88   Temp 36.2 °C (97.1 °F) (Temporal)   Resp 15   Ht 1.702 m (5' 7\")   Wt 72.6 kg (160 lb)   SpO2 98%   BMI 25.06 kg/m²    Constitutional: Awake and alert.  Non toxic  HENT: Normocephalic atraumatic.  Eyes: Pupils are dilated but reactive bilaterally   Neck: Grossly normal range of motion.  Cardiovascular: Normal heart rate, Normal rhythm.   Thorax & Lungs: No respiratory distress  Abdomen: Soft, non-distended, nontender to palpation in all 4 quadrants, no mass  Skin: No obvious rash.  Extremities: Warm, well perfused. No clubbing, cyanosis, edema   Neurologic: GCS 15, no focal deficit   Psychiatric: Normal for situation      EKG/LABS  Results for orders placed or performed during the hospital encounter of 06/15/25   CBC WITH DIFFERENTIAL    Collection Time: 06/15/25  1:45 AM   Result Value Ref Range    WBC 10.9 (H) 4.8 - 10.8 K/uL    RBC 6.16 (H) 4.70 - 6.10 M/uL    Hemoglobin 17.5 14.0 - 18.0 g/dL    Hematocrit 50.7 42.0 - 52.0 %    MCV 82.3 81.4 - 97.8 fL    MCH 28.4 27.0 - 33.0 pg    MCHC 34.5 32.3 - 36.5 g/dL    RDW 39.8 35.9 - 50.0 fL    Platelet Count 377 164 - 446 K/uL    MPV 9.6 9.0 - 12.9 fL    Neutrophils-Polys 71.70 44.00 - 72.00 %    Lymphocytes 21.90 (L) 22.00 - 41.00 %    Monocytes 5.10 0.00 - 13.40 %    Eosinophils 0.60 0.00 - 6.90 %    Basophils 0.40 0.00 - 1.80 %    Immature Granulocytes 0.30 0.00 - 0.90 %    Nucleated RBC 0.00 0.00 - 0.20 /100 WBC    " Neutrophils (Absolute) 7.80 (H) 1.82 - 7.42 K/uL    Lymphs (Absolute) 2.38 1.00 - 4.80 K/uL    Monos (Absolute) 0.55 0.00 - 0.85 K/uL    Eos (Absolute) 0.07 0.00 - 0.51 K/uL    Baso (Absolute) 0.04 0.00 - 0.12 K/uL    Immature Granulocytes (abs) 0.03 0.00 - 0.11 K/uL    NRBC (Absolute) 0.00 K/uL   COMP METABOLIC PANEL    Collection Time: 06/15/25  1:45 AM   Result Value Ref Range    Sodium 143 135 - 145 mmol/L    Potassium 4.0 3.6 - 5.5 mmol/L    Chloride 104 96 - 112 mmol/L    Co2 21 20 - 33 mmol/L    Anion Gap 18.0 (H) 7.0 - 16.0    Glucose 119 (H) 65 - 99 mg/dL    Bun 10 8 - 22 mg/dL    Creatinine 0.91 0.50 - 1.40 mg/dL    Calcium 9.3 8.5 - 10.5 mg/dL    Correct Calcium 8.5 8.5 - 10.5 mg/dL    AST(SGOT) 38 12 - 45 U/L    ALT(SGPT) 46 2 - 50 U/L    Alkaline Phosphatase 105 (H) 30 - 99 U/L    Total Bilirubin 0.4 0.1 - 1.5 mg/dL    Albumin 5.0 (H) 3.2 - 4.9 g/dL    Total Protein 8.5 (H) 6.0 - 8.2 g/dL    Globulin 3.5 1.9 - 3.5 g/dL    A-G Ratio 1.4 g/dL   LIPASE    Collection Time: 06/15/25  1:45 AM   Result Value Ref Range    Lipase 25 11 - 82 U/L   DIAGNOSTIC ALCOHOL    Collection Time: 06/15/25  1:45 AM   Result Value Ref Range    Diagnostic Alcohol 266.0 (H) <10.1 mg/dL   ESTIMATED GFR    Collection Time: 06/15/25  1:45 AM   Result Value Ref Range    GFR (CKD-EPI) 119 >60 mL/min/1.73 m 2         RADIOLOGY/PROCEDURES   I have independently interpreted the diagnostic imaging associated with this visit and am waiting the final reading from the radiologist.   My preliminary interpretation is as follows: NO large bleed    Radiologist interpretation:  CT-HEAD W/O   Final Result      No acute intracranial findings.                   COURSE & MEDICAL DECISION MAKING    ASSESSMENT, COURSE AND PLAN  Care Narrative: This is a 25-year-old with history of alcohol use and alcohol withdrawal seizures who presents with seizure-like activity.  Patient arrived initially with normal vital signs, normal mental status but I was  called urgently to the bedside as patient started having witnessed seizure.  Fortunately he was protecting his airway no significant hemodynamic compromise but was actively seizing.  He was given 10 mg of Valium.  I am most concerned about the possibility of alcohol withdrawal seizures in this clinical context and will continue to treat with phenobarb.    CT head without evidence of intracranial mass or bleed he has no significant laboratory abnormalities other than an elevated blood alcohol level.  He has no recent illness.  No fevers or altered mentation to suggest a CNS infection.    Patient did look significantly improved after receiving Valium and phenobarbital and has remained stable, withdrawal symptoms well controlled. He has returned to his baseline mental status and is not in status epilepticus he will however require admission to the hospital for continued management of this.    #Abdominal Pain  Regarding his abdominal pain likely in the setting of alcohol use.  He does have a slight leukocytosis which certainly could be elevated in the setting of underlying seizure.  He has normal LFTs no right upper quadrant pain and I doubt acute cholecystitis.  His lipase is normal and he does not have pancreatitis.  He did have a recent CT that was performed at [facility] several days ago which I reviewed which was unremarkable given benign exam.  I have very low suspicion for focal inflammatory or surgical process that would warrant repeating imaging today    I did discuss with the hospitalist Dr. Bowling who will admit the patient for further management of alcohol withdrawal.      Hydration: Based on the patient's presentation of Acute Vomiting the patient was given IV fluids. IV Hydration was used because oral hydration was not adequate alone. Upon recheck following hydration, the patient was improved.      Critical care time : Praful presents with an acute critical illness and in my judgement had significant  potential for imminent deterioration. I provided 31 minutes of Critical Care time to this patient, exclusive of any separately billable procedures. This included bedside direction of care, frequent re-evaluations, time spent coordinating ongoing consultant care and time of medical documentation.          DISPOSITION AND DISCUSSIONS  I have discussed management of the patient with the following physicians and RADHA's:  Dr. Bowling     Discussion of management with other [facility] or appropriate source(s): None       FINAL DIAGNOSIS  1. Alcohol withdrawal seizure without complication (HCC) Acute   2. Epigastric pain Acute        Electronically signed by: Aline Snider M.D., 6/15/2025 1:53 AM           [1] No Known Allergies

## 2025-06-15 NOTE — ASSESSMENT & PLAN NOTE
Send U-Tox  In the past, has been positive for cocaine, cannabis, and opiates, benzodiazepines  Avoid beta-blockers for now until negative urine drug screen result

## 2025-06-15 NOTE — ASSESSMENT & PLAN NOTE
Patient noted to have 2 seizures  ON PRESENTATION   -- alcohol level 266, less likely alcohol withdrawal seizure  CT head negative  CIWA protocol initiated, monitor for DTs  IV thiamine, IV folic acid  Fluids  Seizure, fall and aspiration  precautions

## 2025-06-16 PROBLEM — D72.829 LEUKOCYTOSIS: Status: ACTIVE | Noted: 2025-06-16

## 2025-06-16 PROBLEM — K21.9 GERD (GASTROESOPHAGEAL REFLUX DISEASE): Status: ACTIVE | Noted: 2025-06-16

## 2025-06-16 LAB
ALBUMIN SERPL BCP-MCNC: 3.8 G/DL (ref 3.2–4.9)
ALBUMIN/GLOB SERPL: 1.5 G/DL
ALP SERPL-CCNC: 83 U/L (ref 30–99)
ALT SERPL-CCNC: 30 U/L (ref 2–50)
ANION GAP SERPL CALC-SCNC: 10 MMOL/L (ref 7–16)
AST SERPL-CCNC: 29 U/L (ref 12–45)
BILIRUB SERPL-MCNC: 0.8 MG/DL (ref 0.1–1.5)
BUN SERPL-MCNC: 7 MG/DL (ref 8–22)
CALCIUM ALBUM COR SERPL-MCNC: 8.7 MG/DL (ref 8.5–10.5)
CALCIUM SERPL-MCNC: 8.5 MG/DL (ref 8.5–10.5)
CHLORIDE SERPL-SCNC: 107 MMOL/L (ref 96–112)
CO2 SERPL-SCNC: 23 MMOL/L (ref 20–33)
CREAT SERPL-MCNC: 0.82 MG/DL (ref 0.5–1.4)
EKG IMPRESSION: NORMAL
ERYTHROCYTE [DISTWIDTH] IN BLOOD BY AUTOMATED COUNT: 42.4 FL (ref 35.9–50)
GFR SERPLBLD CREATININE-BSD FMLA CKD-EPI: 124 ML/MIN/1.73 M 2
GLOBULIN SER CALC-MCNC: 2.5 G/DL (ref 1.9–3.5)
GLUCOSE SERPL-MCNC: 81 MG/DL (ref 65–99)
HCT VFR BLD AUTO: 44.4 % (ref 42–52)
HGB BLD-MCNC: 14.6 G/DL (ref 14–18)
MCH RBC QN AUTO: 28.6 PG (ref 27–33)
MCHC RBC AUTO-ENTMCNC: 32.9 G/DL (ref 32.3–36.5)
MCV RBC AUTO: 86.9 FL (ref 81.4–97.8)
PLATELET # BLD AUTO: 249 K/UL (ref 164–446)
PMV BLD AUTO: 10 FL (ref 9–12.9)
POTASSIUM SERPL-SCNC: 4.1 MMOL/L (ref 3.6–5.5)
PROT SERPL-MCNC: 6.3 G/DL (ref 6–8.2)
RBC # BLD AUTO: 5.11 M/UL (ref 4.7–6.1)
SODIUM SERPL-SCNC: 140 MMOL/L (ref 135–145)
TROPONIN T SERPL-MCNC: 10 NG/L (ref 6–19)
WBC # BLD AUTO: 6.9 K/UL (ref 4.8–10.8)

## 2025-06-16 PROCEDURE — 80053 COMPREHEN METABOLIC PANEL: CPT

## 2025-06-16 PROCEDURE — A9270 NON-COVERED ITEM OR SERVICE: HCPCS

## 2025-06-16 PROCEDURE — 700105 HCHG RX REV CODE 258: Performed by: HOSPITALIST

## 2025-06-16 PROCEDURE — 85027 COMPLETE CBC AUTOMATED: CPT

## 2025-06-16 PROCEDURE — 700102 HCHG RX REV CODE 250 W/ 637 OVERRIDE(OP): Performed by: STUDENT IN AN ORGANIZED HEALTH CARE EDUCATION/TRAINING PROGRAM

## 2025-06-16 PROCEDURE — A9270 NON-COVERED ITEM OR SERVICE: HCPCS | Performed by: HOSPITALIST

## 2025-06-16 PROCEDURE — 700102 HCHG RX REV CODE 250 W/ 637 OVERRIDE(OP)

## 2025-06-16 PROCEDURE — 93010 ELECTROCARDIOGRAM REPORT: CPT | Performed by: INTERNAL MEDICINE

## 2025-06-16 PROCEDURE — 93005 ELECTROCARDIOGRAM TRACING: CPT | Mod: TC | Performed by: HOSPITALIST

## 2025-06-16 PROCEDURE — 700102 HCHG RX REV CODE 250 W/ 637 OVERRIDE(OP): Performed by: HOSPITALIST

## 2025-06-16 PROCEDURE — 700105 HCHG RX REV CODE 258: Performed by: STUDENT IN AN ORGANIZED HEALTH CARE EDUCATION/TRAINING PROGRAM

## 2025-06-16 PROCEDURE — 700111 HCHG RX REV CODE 636 W/ 250 OVERRIDE (IP): Performed by: STUDENT IN AN ORGANIZED HEALTH CARE EDUCATION/TRAINING PROGRAM

## 2025-06-16 PROCEDURE — A9270 NON-COVERED ITEM OR SERVICE: HCPCS | Performed by: STUDENT IN AN ORGANIZED HEALTH CARE EDUCATION/TRAINING PROGRAM

## 2025-06-16 PROCEDURE — 770020 HCHG ROOM/CARE - TELE (206)

## 2025-06-16 PROCEDURE — 99233 SBSQ HOSP IP/OBS HIGH 50: CPT | Performed by: HOSPITALIST

## 2025-06-16 PROCEDURE — 84484 ASSAY OF TROPONIN QUANT: CPT

## 2025-06-16 PROCEDURE — 36415 COLL VENOUS BLD VENIPUNCTURE: CPT

## 2025-06-16 PROCEDURE — 700111 HCHG RX REV CODE 636 W/ 250 OVERRIDE (IP): Mod: JZ

## 2025-06-16 RX ORDER — CHLORDIAZEPOXIDE HYDROCHLORIDE 25 MG/1
25 CAPSULE, GELATIN COATED ORAL EVERY 8 HOURS
Status: COMPLETED | OUTPATIENT
Start: 2025-06-16 | End: 2025-06-17

## 2025-06-16 RX ORDER — FAMOTIDINE 20 MG/1
20 TABLET, FILM COATED ORAL 2 TIMES DAILY
Status: DISCONTINUED | OUTPATIENT
Start: 2025-06-16 | End: 2025-06-17 | Stop reason: HOSPADM

## 2025-06-16 RX ORDER — SODIUM CHLORIDE, SODIUM LACTATE, POTASSIUM CHLORIDE, AND CALCIUM CHLORIDE .6; .31; .03; .02 G/100ML; G/100ML; G/100ML; G/100ML
1000 INJECTION, SOLUTION INTRAVENOUS ONCE
Status: COMPLETED | OUTPATIENT
Start: 2025-06-16 | End: 2025-06-16

## 2025-06-16 RX ORDER — CYCLOBENZAPRINE HCL 10 MG
10 TABLET ORAL 3 TIMES DAILY PRN
Status: DISCONTINUED | OUTPATIENT
Start: 2025-06-16 | End: 2025-06-17 | Stop reason: HOSPADM

## 2025-06-16 RX ADMIN — OXYCODONE 5 MG: 5 TABLET ORAL at 04:38

## 2025-06-16 RX ADMIN — LORAZEPAM 0.5 MG: 0.5 TABLET ORAL at 13:44

## 2025-06-16 RX ADMIN — HYDROMORPHONE HYDROCHLORIDE 0.25 MG: 1 INJECTION, SOLUTION INTRAMUSCULAR; INTRAVENOUS; SUBCUTANEOUS at 16:56

## 2025-06-16 RX ADMIN — OXYCODONE 5 MG: 5 TABLET ORAL at 20:12

## 2025-06-16 RX ADMIN — HYDROMORPHONE HYDROCHLORIDE 0.25 MG: 1 INJECTION, SOLUTION INTRAMUSCULAR; INTRAVENOUS; SUBCUTANEOUS at 11:24

## 2025-06-16 RX ADMIN — OXYCODONE 5 MG: 5 TABLET ORAL at 09:06

## 2025-06-16 RX ADMIN — ENOXAPARIN SODIUM 40 MG: 100 INJECTION SUBCUTANEOUS at 16:56

## 2025-06-16 RX ADMIN — HALOPERIDOL LACTATE 5 MG: 5 INJECTION, SOLUTION INTRAMUSCULAR at 19:51

## 2025-06-16 RX ADMIN — FAMOTIDINE 20 MG: 20 TABLET, FILM COATED ORAL at 11:16

## 2025-06-16 RX ADMIN — LORAZEPAM 3 MG: 2 TABLET ORAL at 19:06

## 2025-06-16 RX ADMIN — LORAZEPAM 3 MG: 2 TABLET ORAL at 20:11

## 2025-06-16 RX ADMIN — LIDOCAINE HYDROCHLORIDE 30 ML: 20 SOLUTION OROPHARYNGEAL at 11:16

## 2025-06-16 RX ADMIN — SODIUM CHLORIDE, POTASSIUM CHLORIDE, SODIUM LACTATE AND CALCIUM CHLORIDE 1000 ML: 600; 310; 30; 20 INJECTION, SOLUTION INTRAVENOUS at 11:15

## 2025-06-16 RX ADMIN — FAMOTIDINE 20 MG: 20 TABLET, FILM COATED ORAL at 16:56

## 2025-06-16 RX ADMIN — SODIUM CHLORIDE: 9 INJECTION, SOLUTION INTRAVENOUS at 23:00

## 2025-06-16 RX ADMIN — THIAMINE HYDROCHLORIDE 100 MG: 100 INJECTION, SOLUTION INTRAMUSCULAR; INTRAVENOUS at 04:38

## 2025-06-16 RX ADMIN — SODIUM CHLORIDE: 9 INJECTION, SOLUTION INTRAVENOUS at 01:25

## 2025-06-16 RX ADMIN — ACETAMINOPHEN 650 MG: 325 TABLET ORAL at 08:36

## 2025-06-16 RX ADMIN — LORAZEPAM 3 MG: 2 TABLET ORAL at 08:31

## 2025-06-16 RX ADMIN — OXYCODONE 5 MG: 5 TABLET ORAL at 14:53

## 2025-06-16 RX ADMIN — LORAZEPAM 3 MG: 2 TABLET ORAL at 21:35

## 2025-06-16 RX ADMIN — LORAZEPAM 3 MG: 2 TABLET ORAL at 01:31

## 2025-06-16 RX ADMIN — OXYCODONE 5 MG: 5 TABLET ORAL at 01:30

## 2025-06-16 RX ADMIN — CHLORDIAZEPOXIDE HYDROCHLORIDE 25 MG: 25 CAPSULE ORAL at 13:23

## 2025-06-16 RX ADMIN — SODIUM CHLORIDE: 9 INJECTION, SOLUTION INTRAVENOUS at 13:27

## 2025-06-16 RX ADMIN — LORAZEPAM 2 MG: 2 TABLET ORAL at 04:39

## 2025-06-16 RX ADMIN — ONDANSETRON 4 MG: 2 INJECTION INTRAMUSCULAR; INTRAVENOUS at 08:23

## 2025-06-16 RX ADMIN — CHLORDIAZEPOXIDE HYDROCHLORIDE 25 MG: 25 CAPSULE ORAL at 20:11

## 2025-06-16 RX ADMIN — CYCLOBENZAPRINE 10 MG: 10 TABLET, FILM COATED ORAL at 20:18

## 2025-06-16 RX ADMIN — LORAZEPAM 1 MG: 1 TABLET ORAL at 17:13

## 2025-06-16 RX ADMIN — LORAZEPAM 1 MG: 1 TABLET ORAL at 09:46

## 2025-06-16 ASSESSMENT — LIFESTYLE VARIABLES
VISUAL DISTURBANCES: NOT PRESENT
VISUAL DISTURBANCES: NOT PRESENT
ORIENTATION AND CLOUDING OF SENSORIUM: CANNOT DO SERIAL ADDITIONS OR IS UNCERTAIN ABOUT DATE
VISUAL DISTURBANCES: NOT PRESENT
PAROXYSMAL SWEATS: BARELY PERCEPTIBLE SWEATING. PALMS MOIST
NAUSEA AND VOMITING: *
NAUSEA AND VOMITING: *
AUDITORY DISTURBANCES: NOT PRESENT
HEADACHE, FULLNESS IN HEAD: MILD
AGITATION: *
TOTAL SCORE: MODERATE ITCHING, PINS AND NEEDLES SENSATION, BURNING OR NUMBNESS
ORIENTATION AND CLOUDING OF SENSORIUM: ORIENTED AND CAN DO SERIAL ADDITIONS
NAUSEA AND VOMITING: *
TOTAL SCORE: MILD ITCHING, PINS AND NEEDLES SENSATION, BURNING OR NUMBNESS
PAROXYSMAL SWEATS: BARELY PERCEPTIBLE SWEATING. PALMS MOIST
VISUAL DISTURBANCES: NOT PRESENT
NAUSEA AND VOMITING: *
VISUAL DISTURBANCES: NOT PRESENT
AGITATION: NORMAL ACTIVITY
ORIENTATION AND CLOUDING OF SENSORIUM: ORIENTED AND CAN DO SERIAL ADDITIONS
ORIENTATION AND CLOUDING OF SENSORIUM: CANNOT DO SERIAL ADDITIONS OR IS UNCERTAIN ABOUT DATE
ORIENTATION AND CLOUDING OF SENSORIUM: CANNOT DO SERIAL ADDITIONS OR IS UNCERTAIN ABOUT DATE
ANXIETY: MILDLY ANXIOUS
AGITATION: NORMAL ACTIVITY
AUDITORY DISTURBANCES: NOT PRESENT
TOTAL SCORE: 11
AGITATION: SOMEWHAT MORE THAN NORMAL ACTIVITY
PAROXYSMAL SWEATS: BARELY PERCEPTIBLE SWEATING. PALMS MOIST
TREMOR: MODERATE TREMOR WITH ARMS EXTENDED
AUDITORY DISTURBANCES: NOT PRESENT
ORIENTATION AND CLOUDING OF SENSORIUM: ORIENTED AND CAN DO SERIAL ADDITIONS
ANXIETY: MODERATELY ANXIOUS OR GUARDED, SO ANXIETY IS INFERRED
TOTAL SCORE: 12
VISUAL DISTURBANCES: NOT PRESENT
ORIENTATION AND CLOUDING OF SENSORIUM: ORIENTED AND CAN DO SERIAL ADDITIONS
HEADACHE, FULLNESS IN HEAD: MILD
ANXIETY: *
ANXIETY: MILDLY ANXIOUS
AGITATION: *
TREMOR: *
TOTAL SCORE: 15
PAROXYSMAL SWEATS: BARELY PERCEPTIBLE SWEATING. PALMS MOIST
AGITATION: NORMAL ACTIVITY
AGITATION: MODERATELY FIDGETY AND RESTLESS
PAROXYSMAL SWEATS: BARELY PERCEPTIBLE SWEATING. PALMS MOIST
TREMOR: *
PAROXYSMAL SWEATS: BARELY PERCEPTIBLE SWEATING. PALMS MOIST
HEADACHE, FULLNESS IN HEAD: NOT PRESENT
AUDITORY DISTURBANCES: NOT PRESENT
TOTAL SCORE: MODERATE ITCHING, PINS AND NEEDLES SENSATION, BURNING OR NUMBNESS
AGITATION: *
VISUAL DISTURBANCES: NOT PRESENT
AGITATION: NORMAL ACTIVITY
TOTAL SCORE: MODERATE ITCHING, PINS AND NEEDLES SENSATION, BURNING OR NUMBNESS
NAUSEA AND VOMITING: NO NAUSEA AND NO VOMITING
PAROXYSMAL SWEATS: BARELY PERCEPTIBLE SWEATING. PALMS MOIST
HEADACHE, FULLNESS IN HEAD: MODERATE
TREMOR: *
PAROXYSMAL SWEATS: BARELY PERCEPTIBLE SWEATING. PALMS MOIST
TREMOR: *
ORIENTATION AND CLOUDING OF SENSORIUM: CANNOT DO SERIAL ADDITIONS OR IS UNCERTAIN ABOUT DATE
PAROXYSMAL SWEATS: BARELY PERCEPTIBLE SWEATING. PALMS MOIST
ANXIETY: *
ANXIETY: MILDLY ANXIOUS
ANXIETY: *
HEADACHE, FULLNESS IN HEAD: MODERATE
AGITATION: NORMAL ACTIVITY
TOTAL SCORE: 19
TOTAL SCORE: VERY MILD ITCHING, PINS AND NEEDLES SENSATION, BURNING OR NUMBNESS
VISUAL DISTURBANCES: NOT PRESENT
HEADACHE, FULLNESS IN HEAD: MODERATE
VISUAL DISTURBANCES: NOT PRESENT
AUDITORY DISTURBANCES: NOT PRESENT
AUDITORY DISTURBANCES: NOT PRESENT
NAUSEA AND VOMITING: *
TOTAL SCORE: VERY MILD ITCHING, PINS AND NEEDLES SENSATION, BURNING OR NUMBNESS
ORIENTATION AND CLOUDING OF SENSORIUM: ORIENTED AND CAN DO SERIAL ADDITIONS
TOTAL SCORE: MODERATE ITCHING, PINS AND NEEDLES SENSATION, BURNING OR NUMBNESS
PAROXYSMAL SWEATS: BARELY PERCEPTIBLE SWEATING. PALMS MOIST
HEADACHE, FULLNESS IN HEAD: MODERATE
PAROXYSMAL SWEATS: BARELY PERCEPTIBLE SWEATING. PALMS MOIST
TREMOR: *
TREMOR: *
TOTAL SCORE: 8
ANXIETY: MILDLY ANXIOUS
TOTAL SCORE: 21
NAUSEA AND VOMITING: *
AGITATION: NORMAL ACTIVITY
TOTAL SCORE: MILD ITCHING, PINS AND NEEDLES SENSATION, BURNING OR NUMBNESS
TOTAL SCORE: 11
AUDITORY DISTURBANCES: NOT PRESENT
TOTAL SCORE: 19
ORIENTATION AND CLOUDING OF SENSORIUM: ORIENTED AND CAN DO SERIAL ADDITIONS
TOTAL SCORE: 9
TOTAL SCORE: VERY MILD ITCHING, PINS AND NEEDLES SENSATION, BURNING OR NUMBNESS
AGITATION: *
AUDITORY DISTURBANCES: NOT PRESENT
AGITATION: NORMAL ACTIVITY
TREMOR: *
TREMOR: *
ANXIETY: MODERATELY ANXIOUS OR GUARDED, SO ANXIETY IS INFERRED
NAUSEA AND VOMITING: *
ORIENTATION AND CLOUDING OF SENSORIUM: ORIENTED AND CAN DO SERIAL ADDITIONS
PAROXYSMAL SWEATS: BARELY PERCEPTIBLE SWEATING. PALMS MOIST
AUDITORY DISTURBANCES: NOT PRESENT
TREMOR: *
VISUAL DISTURBANCES: NOT PRESENT
ANXIETY: MODERATELY ANXIOUS OR GUARDED, SO ANXIETY IS INFERRED
TREMOR: TREMOR NOT VISIBLE BUT CAN BE FELT, FINGERTIP TO FINGERTIP
ANXIETY: MILDLY ANXIOUS
AUDITORY DISTURBANCES: NOT PRESENT
HEADACHE, FULLNESS IN HEAD: MODERATE
ORIENTATION AND CLOUDING OF SENSORIUM: CANNOT DO SERIAL ADDITIONS OR IS UNCERTAIN ABOUT DATE
AUDITORY DISTURBANCES: NOT PRESENT
TOTAL SCORE: 19
ORIENTATION AND CLOUDING OF SENSORIUM: ORIENTED AND CAN DO SERIAL ADDITIONS
TOTAL SCORE: 6
ANXIETY: MODERATELY ANXIOUS OR GUARDED, SO ANXIETY IS INFERRED
VISUAL DISTURBANCES: NOT PRESENT
TOTAL SCORE: MODERATE ITCHING, PINS AND NEEDLES SENSATION, BURNING OR NUMBNESS
AGITATION: SOMEWHAT MORE THAN NORMAL ACTIVITY
TOTAL SCORE: VERY MILD ITCHING, PINS AND NEEDLES SENSATION, BURNING OR NUMBNESS
TOTAL SCORE: MILD ITCHING, PINS AND NEEDLES SENSATION, BURNING OR NUMBNESS
VISUAL DISTURBANCES: MODERATE SENSITIVITY
HEADACHE, FULLNESS IN HEAD: MODERATE
NAUSEA AND VOMITING: *
NAUSEA AND VOMITING: *
TREMOR: TREMOR NOT VISIBLE BUT CAN BE FELT, FINGERTIP TO FINGERTIP
HEADACHE, FULLNESS IN HEAD: MODERATE
HEADACHE, FULLNESS IN HEAD: MODERATELY SEVERE
NAUSEA AND VOMITING: *
PAROXYSMAL SWEATS: BARELY PERCEPTIBLE SWEATING. PALMS MOIST
TOTAL SCORE: MODERATE ITCHING, PINS AND NEEDLES SENSATION, BURNING OR NUMBNESS
AUDITORY DISTURBANCES: NOT PRESENT
TREMOR: *
NAUSEA AND VOMITING: NO NAUSEA AND NO VOMITING
TREMOR: *
VISUAL DISTURBANCES: NOT PRESENT
ANXIETY: *
NAUSEA AND VOMITING: NO NAUSEA AND NO VOMITING
PAROXYSMAL SWEATS: BARELY PERCEPTIBLE SWEATING. PALMS MOIST
ORIENTATION AND CLOUDING OF SENSORIUM: CANNOT DO SERIAL ADDITIONS OR IS UNCERTAIN ABOUT DATE
HEADACHE, FULLNESS IN HEAD: MODERATE
VISUAL DISTURBANCES: NOT PRESENT
AUDITORY DISTURBANCES: NOT PRESENT
AUDITORY DISTURBANCES: NOT PRESENT
ANXIETY: MODERATELY ANXIOUS OR GUARDED, SO ANXIETY IS INFERRED
HEADACHE, FULLNESS IN HEAD: MODERATE
HEADACHE, FULLNESS IN HEAD: MODERATE
NAUSEA AND VOMITING: MILD NAUSEA WITH NO VOMITING
TOTAL SCORE: 19
TOTAL SCORE: 10
TOTAL SCORE: 18

## 2025-06-16 ASSESSMENT — ENCOUNTER SYMPTOMS
SEIZURES: 0
HEADACHES: 0
CHILLS: 0
DIZZINESS: 0
SHORTNESS OF BREATH: 0
HEARTBURN: 0
DIARRHEA: 0
ABDOMINAL PAIN: 1
VOMITING: 1
PALPITATIONS: 0
FEVER: 0
NAUSEA: 1

## 2025-06-16 ASSESSMENT — PAIN DESCRIPTION - PAIN TYPE
TYPE: ACUTE PAIN

## 2025-06-16 ASSESSMENT — FIBROSIS 4 INDEX: FIB4 SCORE: 0.55

## 2025-06-16 NOTE — CARE PLAN
Problem: Pain - Standard  Goal: Alleviation of pain or a reduction in pain to the patient’s comfort goal  Outcome: Progressing     Problem: Knowledge Deficit - Standard  Goal: Patient and family/care givers will demonstrate understanding of plan of care, disease process/condition, diagnostic tests and medications  Outcome: Progressing     Problem: Optimal Care for Alcohol Withdrawal  Goal: Optimal Care for the alcohol withdrawal patient  Outcome: Progressing     Problem: Seizure Precautions  Goal: Implementation of seizure precautions  Outcome: Progressing     Problem: Lifestyle Changes  Goal: Patient's ability to identify lifestyle changes and available resources to help reduce recurrence of condition will improve  Outcome: Progressing     Problem: Psychosocial  Goal: Patient's level of anxiety will decrease  Outcome: Progressing  Goal: Spiritual and cultural needs incorporated into hospitalization  Outcome: Progressing     Problem: Risk for Aspiration  Goal: Patient's risk for aspiration will be absent or decrease  Outcome: Progressing   The patient is Watcher - Medium risk of patient condition declining or worsening    Shift Goals  Clinical Goals: no seizures, safety  Patient Goals: no seizures, pain control  Family Goals: no family present    Progress made toward(s) clinical / shift goals:    Problem: Pain - Standard  Goal: Alleviation of pain or a reduction in pain to the patient’s comfort goal  Outcome: Progressing     Problem: Knowledge Deficit - Standard  Goal: Patient and family/care givers will demonstrate understanding of plan of care, disease process/condition, diagnostic tests and medications  Outcome: Progressing     Problem: Optimal Care for Alcohol Withdrawal  Goal: Optimal Care for the alcohol withdrawal patient  Outcome: Progressing     Problem: Seizure Precautions  Goal: Implementation of seizure precautions  Outcome: Progressing     Problem: Lifestyle Changes  Goal: Patient's ability to  identify lifestyle changes and available resources to help reduce recurrence of condition will improve  Outcome: Progressing     Problem: Psychosocial  Goal: Patient's level of anxiety will decrease  Outcome: Progressing  Goal: Spiritual and cultural needs incorporated into hospitalization  Outcome: Progressing     Problem: Risk for Aspiration  Goal: Patient's risk for aspiration will be absent or decrease  Outcome: Progressing       Patient is not progressing towards the following goals:

## 2025-06-16 NOTE — PROGRESS NOTES
Monitor Summary: SR/SB 56-72, OH 0.13, QRS 0.10, QT 0.38, with rare PAC per strip from monitor room.

## 2025-06-16 NOTE — PROGRESS NOTES
Per monitor room pt is having ST elevation. Monitor tech, stated that ST elevation was noticed at 0620 and again at 1007. Vital signs stable, pt denies chest pain. MD notified. STAT EKG ordered.

## 2025-06-16 NOTE — CARE PLAN
The patient is Stable - Low risk of patient condition declining or worsening    Shift Goals  Clinical Goals: Safety/CIWA  Patient Goals: no seizures, pain control  Family Goals: SOLA    Progress made toward(s) clinical / shift goals:   Problem: Fall Risk  Goal: Patient will remain free from falls  Outcome: Progressing   Fall precautions in place. Call light, bedside table, and pt belongings within reach. Pt able to demonstrate the use of call light prior to getting out of bed.      Problem: Knowledge Deficit - Standard  Goal: Patient and family/care givers will demonstrate understanding of plan of care, disease process/condition, diagnostic tests and medications  Outcome: Progressing   Pt provided with education on why it is important to manage his withdrawal symptoms. Encourage pt to not go AMA.     Patient is not progressing towards the following goals: N/A

## 2025-06-16 NOTE — PROGRESS NOTES
Hospital Medicine Daily Progress Note    Date of Service  6/16/2025    Chief Complaint  Praful Carmen is a 25 y.o. male admitted 6/15/2025 with etoh withdrawal.    Hospital Course  This is  a 25-year-old male with no significant past medical history other than alcohol was brought in here with tonic-clonic seizure as witnessed by neighbor.  Saturday complaining of nausea, vomiting, epigastric pain.    In ER, patient blood alcohol noted to be 260s, CT  head negative, lipase 26.  During the stay in the hospital, he was a transfer protocol for monitoring alcohol withdrawal.    Interval events:  --patient is alert, awake, answering questions appropriately, he has been complaining of abdominal pain, heartburn, provided Pepcid along with GI cocktail,   ---continue to  IV fluid, CIWa protocol for monitoring alcohol withdrawal including p.o. thiamine and folic acid.  --Noted to be clinically dehydrated, will provide 1 L bolus  --Currently patient had tonic-clonic seizure while his blood work a level being 260, will obtain EEG    I reached out to neurology because I am concerned about having tonic-clonic seizure when his BAL at 260. RECS eeG if EEG is abnormal, consider calling neurology,     I have discussed this patient's plan of care and discharge plan at IDT rounds today with Case Management, Nursing, Nursing leadership, and other members of the IDT team.    Consultants/Specialty  none    Code Status  Full Code    Disposition  The patient is not medically cleared for discharge to home or a post-acute facility.  Anticipate discharge to: home with close outpatient follow-up    I have placed the appropriate orders for post-discharge needs.    Review of Systems  Review of Systems   Constitutional:  Negative for chills, fever and malaise/fatigue.   Respiratory:  Negative for shortness of breath.    Cardiovascular:  Negative for chest pain, palpitations and leg swelling.   Gastrointestinal:  Positive for abdominal pain,  nausea and vomiting. Negative for diarrhea and heartburn.   Genitourinary:  Negative for urgency.   Neurological:  Negative for dizziness, seizures and headaches.   All other systems reviewed and are negative.       Physical Exam  Temp:  [36.3 °C (97.3 °F)-36.7 °C (98.1 °F)] 36.5 °C (97.7 °F)  Pulse:  [52-90] 69  Resp:  [15-18] 16  BP: ()/(66-79) 111/73  SpO2:  [94 %-97 %] 96 %    Physical Exam  Vitals and nursing note reviewed.   Constitutional:       Appearance: Normal appearance. He is not ill-appearing.   HENT:      Head: Normocephalic and atraumatic.      Jaw: There is normal jaw occlusion.      Right Ear: Hearing normal.      Left Ear: Hearing normal.      Mouth/Throat:      Lips: Pink.   Eyes:      Extraocular Movements: Extraocular movements intact.      Pupils: Pupils are equal, round, and reactive to light.   Neck:      Vascular: No carotid bruit.   Cardiovascular:      Rate and Rhythm: Normal rate.      Pulses: Normal pulses.      Heart sounds: S1 normal and S2 normal.   Pulmonary:      Effort: Pulmonary effort is normal.      Breath sounds: Normal breath sounds and air entry. No stridor.   Musculoskeletal:      Cervical back: Normal range of motion.      Right lower leg: No edema.      Left lower leg: No edema.   Skin:     General: Skin is warm.   Neurological:      Mental Status: He is alert and oriented to person, place, and time.      Sensory: Sensation is intact.      Motor: Motor function is intact.   Psychiatric:         Attention and Perception: Attention and perception normal.         Mood and Affect: Mood and affect normal.         Speech: Speech normal.         Behavior: Behavior normal. Behavior is cooperative.         Fluids    Intake/Output Summary (Last 24 hours) at 6/16/2025 1438  Last data filed at 6/16/2025 1300  Gross per 24 hour   Intake --   Output 900 ml   Net -900 ml        Laboratory  Recent Labs     06/15/25  0145 06/16/25  0339   WBC 10.9* 6.9   RBC 6.16* 5.11   HEMOGLOBIN  17.5 14.6   HEMATOCRIT 50.7 44.4   MCV 82.3 86.9   MCH 28.4 28.6   MCHC 34.5 32.9   RDW 39.8 42.4   PLATELETCT 377 249   MPV 9.6 10.0     Recent Labs     06/15/25  0145 06/16/25  0339   SODIUM 143 140   POTASSIUM 4.0 4.1   CHLORIDE 104 107   CO2 21 23   GLUCOSE 119* 81   BUN 10 7*   CREATININE 0.91 0.82   CALCIUM 9.3 8.5                   Imaging  CT-HEAD W/O   Final Result      No acute intracranial findings.                    Assessment/Plan  * Seizure due to alcohol withdrawal, uncomplicated (HCC)- (present on admission)  Assessment & Plan  Patient noted to have 2 seizures  ON PRESENTATION   -- alcohol level 266, less likely alcohol withdrawal seizure  CT head negative  CIWA protocol initiated, monitor for DTs  IV thiamine, IV folic acid  Fluids  Seizure, fall and aspiration  precautions    GERD (gastroesophageal reflux disease)  Assessment & Plan  Pepcid and gi cocktail    Leukocytosis  Assessment & Plan  Likely recative , at 10.9  monitor    Drug abuse (HCC)- (present on admission)  Assessment & Plan  Send U-Tox  In the past, has been positive for cocaine, cannabis, and opiates, benzodiazepines  Avoid beta-blockers for now until negative urine drug screen result         VTE prophylaxis: lovenox    Total time spent 51 minutes. I spent greater than 50% of the time for patient care, counseling, and coordination on this date, including unit/floor time, and face-to-face time with the patient as per interval events, my own review of patient's imaging and lab analysis and developing my assessment and plan above.

## 2025-06-17 VITALS
HEART RATE: 67 BPM | SYSTOLIC BLOOD PRESSURE: 121 MMHG | RESPIRATION RATE: 16 BRPM | WEIGHT: 161.6 LBS | OXYGEN SATURATION: 93 % | DIASTOLIC BLOOD PRESSURE: 59 MMHG | HEIGHT: 67 IN | TEMPERATURE: 97.3 F | BODY MASS INDEX: 25.36 KG/M2

## 2025-06-17 LAB
ALBUMIN SERPL BCP-MCNC: 3.7 G/DL (ref 3.2–4.9)
ALBUMIN/GLOB SERPL: 1.5 G/DL
ALP SERPL-CCNC: 91 U/L (ref 30–99)
ALT SERPL-CCNC: 25 U/L (ref 2–50)
ANION GAP SERPL CALC-SCNC: 9 MMOL/L (ref 7–16)
AST SERPL-CCNC: 29 U/L (ref 12–45)
BILIRUB SERPL-MCNC: 0.8 MG/DL (ref 0.1–1.5)
BUN SERPL-MCNC: 6 MG/DL (ref 8–22)
CALCIUM ALBUM COR SERPL-MCNC: 8.9 MG/DL (ref 8.5–10.5)
CALCIUM SERPL-MCNC: 8.7 MG/DL (ref 8.5–10.5)
CHLORIDE SERPL-SCNC: 107 MMOL/L (ref 96–112)
CO2 SERPL-SCNC: 21 MMOL/L (ref 20–33)
CREAT SERPL-MCNC: 0.78 MG/DL (ref 0.5–1.4)
ERYTHROCYTE [DISTWIDTH] IN BLOOD BY AUTOMATED COUNT: 39.6 FL (ref 35.9–50)
GFR SERPLBLD CREATININE-BSD FMLA CKD-EPI: 126 ML/MIN/1.73 M 2
GLOBULIN SER CALC-MCNC: 2.5 G/DL (ref 1.9–3.5)
GLUCOSE SERPL-MCNC: 78 MG/DL (ref 65–99)
HCT VFR BLD AUTO: 42 % (ref 42–52)
HGB BLD-MCNC: 14.3 G/DL (ref 14–18)
MAGNESIUM SERPL-MCNC: 1.9 MG/DL (ref 1.5–2.5)
MCH RBC QN AUTO: 28.9 PG (ref 27–33)
MCHC RBC AUTO-ENTMCNC: 34 G/DL (ref 32.3–36.5)
MCV RBC AUTO: 85 FL (ref 81.4–97.8)
PHOSPHATE SERPL-MCNC: 3.5 MG/DL (ref 2.5–4.5)
PLATELET # BLD AUTO: 250 K/UL (ref 164–446)
PMV BLD AUTO: 10.1 FL (ref 9–12.9)
POTASSIUM SERPL-SCNC: 4 MMOL/L (ref 3.6–5.5)
PROT SERPL-MCNC: 6.2 G/DL (ref 6–8.2)
RBC # BLD AUTO: 4.94 M/UL (ref 4.7–6.1)
SODIUM SERPL-SCNC: 137 MMOL/L (ref 135–145)
WBC # BLD AUTO: 4.9 K/UL (ref 4.8–10.8)

## 2025-06-17 PROCEDURE — 96372 THER/PROPH/DIAG INJ SC/IM: CPT

## 2025-06-17 PROCEDURE — 700102 HCHG RX REV CODE 250 W/ 637 OVERRIDE(OP): Performed by: HOSPITALIST

## 2025-06-17 PROCEDURE — 36415 COLL VENOUS BLD VENIPUNCTURE: CPT

## 2025-06-17 PROCEDURE — 96374 THER/PROPH/DIAG INJ IV PUSH: CPT

## 2025-06-17 PROCEDURE — 4A00X4Z MEASUREMENT OF CENTRAL NERVOUS ELECTRICAL ACTIVITY, EXTERNAL APPROACH: ICD-10-PCS | Performed by: STUDENT IN AN ORGANIZED HEALTH CARE EDUCATION/TRAINING PROGRAM

## 2025-06-17 PROCEDURE — 700105 HCHG RX REV CODE 258: Performed by: STUDENT IN AN ORGANIZED HEALTH CARE EDUCATION/TRAINING PROGRAM

## 2025-06-17 PROCEDURE — 83735 ASSAY OF MAGNESIUM: CPT

## 2025-06-17 PROCEDURE — 80053 COMPREHEN METABOLIC PANEL: CPT

## 2025-06-17 PROCEDURE — 99239 HOSP IP/OBS DSCHRG MGMT >30: CPT | Performed by: STUDENT IN AN ORGANIZED HEALTH CARE EDUCATION/TRAINING PROGRAM

## 2025-06-17 PROCEDURE — 96376 TX/PRO/DX INJ SAME DRUG ADON: CPT

## 2025-06-17 PROCEDURE — 85027 COMPLETE CBC AUTOMATED: CPT

## 2025-06-17 PROCEDURE — A9270 NON-COVERED ITEM OR SERVICE: HCPCS | Performed by: STUDENT IN AN ORGANIZED HEALTH CARE EDUCATION/TRAINING PROGRAM

## 2025-06-17 PROCEDURE — 96361 HYDRATE IV INFUSION ADD-ON: CPT

## 2025-06-17 PROCEDURE — 700111 HCHG RX REV CODE 636 W/ 250 OVERRIDE (IP): Performed by: STUDENT IN AN ORGANIZED HEALTH CARE EDUCATION/TRAINING PROGRAM

## 2025-06-17 PROCEDURE — 99285 EMERGENCY DEPT VISIT HI MDM: CPT

## 2025-06-17 PROCEDURE — 700102 HCHG RX REV CODE 250 W/ 637 OVERRIDE(OP): Performed by: STUDENT IN AN ORGANIZED HEALTH CARE EDUCATION/TRAINING PROGRAM

## 2025-06-17 PROCEDURE — A9270 NON-COVERED ITEM OR SERVICE: HCPCS | Performed by: HOSPITALIST

## 2025-06-17 PROCEDURE — 84100 ASSAY OF PHOSPHORUS: CPT

## 2025-06-17 PROCEDURE — 96375 TX/PRO/DX INJ NEW DRUG ADDON: CPT

## 2025-06-17 PROCEDURE — 95819 EEG AWAKE AND ASLEEP: CPT | Performed by: STUDENT IN AN ORGANIZED HEALTH CARE EDUCATION/TRAINING PROGRAM

## 2025-06-17 PROCEDURE — 95819 EEG AWAKE AND ASLEEP: CPT | Mod: 26 | Performed by: STUDENT IN AN ORGANIZED HEALTH CARE EDUCATION/TRAINING PROGRAM

## 2025-06-17 RX ADMIN — FAMOTIDINE 20 MG: 20 TABLET, FILM COATED ORAL at 04:42

## 2025-06-17 RX ADMIN — OXYCODONE 5 MG: 5 TABLET ORAL at 00:50

## 2025-06-17 RX ADMIN — LORAZEPAM 1 MG: 1 TABLET ORAL at 02:18

## 2025-06-17 RX ADMIN — OXYCODONE 5 MG: 5 TABLET ORAL at 04:44

## 2025-06-17 RX ADMIN — LORAZEPAM 3 MG: 2 TABLET ORAL at 00:50

## 2025-06-17 RX ADMIN — THIAMINE HYDROCHLORIDE 100 MG: 100 INJECTION, SOLUTION INTRAMUSCULAR; INTRAVENOUS at 04:42

## 2025-06-17 RX ADMIN — SODIUM CHLORIDE: 9 INJECTION, SOLUTION INTRAVENOUS at 11:05

## 2025-06-17 RX ADMIN — LORAZEPAM 3 MG: 2 TABLET ORAL at 09:46

## 2025-06-17 RX ADMIN — CHLORDIAZEPOXIDE HYDROCHLORIDE 25 MG: 25 CAPSULE ORAL at 04:43

## 2025-06-17 RX ADMIN — OXYCODONE 5 MG: 5 TABLET ORAL at 08:35

## 2025-06-17 RX ADMIN — LORAZEPAM 1 MG: 1 TABLET ORAL at 06:33

## 2025-06-17 ASSESSMENT — LIFESTYLE VARIABLES
ANXIETY: MILDLY ANXIOUS
ANXIETY: *
AGITATION: NORMAL ACTIVITY
TREMOR: *
ANXIETY: NO ANXIETY (AT EASE)
ORIENTATION AND CLOUDING OF SENSORIUM: CANNOT DO SERIAL ADDITIONS OR IS UNCERTAIN ABOUT DATE
ANXIETY: *
VISUAL DISTURBANCES: NOT PRESENT
ANXIETY: *
HEADACHE, FULLNESS IN HEAD: MILD
AGITATION: NORMAL ACTIVITY
HEADACHE, FULLNESS IN HEAD: MODERATE
TOTAL SCORE: 17
AUDITORY DISTURBANCES: NOT PRESENT
VISUAL DISTURBANCES: NOT PRESENT
AUDITORY DISTURBANCES: NOT PRESENT
VISUAL DISTURBANCES: NOT PRESENT
TOTAL SCORE: 15
PAROXYSMAL SWEATS: BARELY PERCEPTIBLE SWEATING. PALMS MOIST
AGITATION: NORMAL ACTIVITY
TOTAL SCORE: 15
TOTAL SCORE: MILD ITCHING, PINS AND NEEDLES SENSATION, BURNING OR NUMBNESS
AGITATION: SOMEWHAT MORE THAN NORMAL ACTIVITY
ORIENTATION AND CLOUDING OF SENSORIUM: CANNOT DO SERIAL ADDITIONS OR IS UNCERTAIN ABOUT DATE
HEADACHE, FULLNESS IN HEAD: MILD
PAROXYSMAL SWEATS: *
TREMOR: *
TREMOR: *
ORIENTATION AND CLOUDING OF SENSORIUM: CANNOT DO SERIAL ADDITIONS OR IS UNCERTAIN ABOUT DATE
HEADACHE, FULLNESS IN HEAD: MODERATE
PAROXYSMAL SWEATS: *
TREMOR: *
PAROXYSMAL SWEATS: *
TOTAL SCORE: VERY MILD ITCHING, PINS AND NEEDLES SENSATION, BURNING OR NUMBNESS
ORIENTATION AND CLOUDING OF SENSORIUM: ORIENTED AND CAN DO SERIAL ADDITIONS
AGITATION: SOMEWHAT MORE THAN NORMAL ACTIVITY
AGITATION: NORMAL ACTIVITY
TREMOR: *
PAROXYSMAL SWEATS: BARELY PERCEPTIBLE SWEATING. PALMS MOIST
HEADACHE, FULLNESS IN HEAD: MODERATE
ORIENTATION AND CLOUDING OF SENSORIUM: ORIENTED AND CAN DO SERIAL ADDITIONS
NAUSEA AND VOMITING: NO NAUSEA AND NO VOMITING
ORIENTATION AND CLOUDING OF SENSORIUM: ORIENTED AND CAN DO SERIAL ADDITIONS
HEADACHE, FULLNESS IN HEAD: MODERATE
NAUSEA AND VOMITING: MILD NAUSEA WITH NO VOMITING
AUDITORY DISTURBANCES: NOT PRESENT
NAUSEA AND VOMITING: *
TOTAL SCORE: 9
TOTAL SCORE: VERY MILD ITCHING, PINS AND NEEDLES SENSATION, BURNING OR NUMBNESS
VISUAL DISTURBANCES: NOT PRESENT
TOTAL SCORE: 10
ORIENTATION AND CLOUDING OF SENSORIUM: ORIENTED AND CAN DO SERIAL ADDITIONS
AGITATION: NORMAL ACTIVITY
PAROXYSMAL SWEATS: *
TREMOR: *
PAROXYSMAL SWEATS: BARELY PERCEPTIBLE SWEATING. PALMS MOIST
TOTAL SCORE: 10
NAUSEA AND VOMITING: *
AUDITORY DISTURBANCES: NOT PRESENT
TOTAL SCORE: VERY MILD ITCHING, PINS AND NEEDLES SENSATION, BURNING OR NUMBNESS
NAUSEA AND VOMITING: *
TOTAL SCORE: MILD ITCHING, PINS AND NEEDLES SENSATION, BURNING OR NUMBNESS
VISUAL DISTURBANCES: NOT PRESENT
TOTAL SCORE: MODERATE ITCHING, PINS AND NEEDLES SENSATION, BURNING OR NUMBNESS
VISUAL DISTURBANCES: NOT PRESENT
AUDITORY DISTURBANCES: NOT PRESENT
HEADACHE, FULLNESS IN HEAD: MODERATE
ANXIETY: *
TREMOR: *
AUDITORY DISTURBANCES: NOT PRESENT
ANXIETY: MILDLY ANXIOUS
NAUSEA AND VOMITING: MILD NAUSEA WITH NO VOMITING
VISUAL DISTURBANCES: NOT PRESENT
AUDITORY DISTURBANCES: NOT PRESENT
NAUSEA AND VOMITING: MILD NAUSEA WITH NO VOMITING
TOTAL SCORE: 10
TOTAL SCORE: MILD ITCHING, PINS AND NEEDLES SENSATION, BURNING OR NUMBNESS

## 2025-06-17 ASSESSMENT — PAIN DESCRIPTION - PAIN TYPE
TYPE: ACUTE PAIN

## 2025-06-17 ASSESSMENT — COGNITIVE AND FUNCTIONAL STATUS - GENERAL
SUGGESTED CMS G CODE MODIFIER DAILY ACTIVITY: CH
MOBILITY SCORE: 23
DAILY ACTIVITIY SCORE: 24
SUGGESTED CMS G CODE MODIFIER MOBILITY: CI
CLIMB 3 TO 5 STEPS WITH RAILING: A LITTLE

## 2025-06-17 ASSESSMENT — FIBROSIS 4 INDEX: FIB4 SCORE: 0.55

## 2025-06-17 NOTE — PROCEDURES
INPATIENT ROUTINE VIDEO ELECTROENCEPHALOGRAM REPORT    REFERRING PROVIDER: Kayla Ballesteros M.d.  DOS: 6/17/2025  STUDY DURATION: 0 hours and 31 minutes of total recording time.     INDICATION:  Praful Carmen 25 y.o. male presenting with seizure(s)    RELEVANT MEDICATIONS/TREATMENTS:   Scheduled Medications[1]    TECHNIQUE:   Routine VEEG was set up by a Neurodiagnostic technologist who performed education to the patient and staff. A minimum of 23 electrodes and 23 channel recording was setup and performed by Neurodiagnostic technologist, in accordance with the international 10-20 system. The study was reviewed in bipolar and referential montages. The recording examined the patient in the  awake, drowsy, and sleep state(s).     DESCRIPTION OF THE RECORD:  During wakefulness, the background was continuous and showed a 10.5 Hz posterior dominant rhythm.  There was reactivity to eye closure/opening.  An anterior-posterior gradient was noted with faster beta frequencies seen anteriorly.  Excess diffuse beta was present. During drowsiness, theta/delta frequencies were seen.    EEG Sleep: Sleep was captured and was characterized by diffuse background delta/theta activity with a loss of myogenic artifact.  N2 sleep transients in the form of sleep spindles and vertex waves were seen in the leads over the central regions.     ICTAL AND INTERICTAL FINDINGS:   No focal or generalized epileptiform activity noted.     No regional slowing or persistent focal asymmetries were seen.    No seizures.     ACTIVATION PROCEDURES:   Hyperventilation was performed by the patient for a total of 3 minutes. The technician performing the test noted good effort. This technique produced electroencephalographic changes in keeping with the expected bilaterally synchronous, frontally predominant, high amplitude slow waves build up.  and Intermittent Photic stimulation was performed in a stepwise fashion from 1 to 30 Hz and elicited a normal  response (photic driving), most noticeable in the posterior leads.    EKG: Sampling of the EKG recording showed sinus rhythm    EVENTS:  None    INTERPRETATION:   Normal video EEG recording in the awake, drowsy, and sleep state(s):  - No regional slowing or persistent focal asymmetries were seen. Excess beta was present, a finding which can be seen in the setting of medication (eg. Benzodiazepine) use.   - No epileptiform discharges were seen.  - No seizures.       Note: As always, a normal EEG does not rule out the possibility of seizures or exclude a diagnosis of epilepsy.  If the clinical suspicion remains high for seizures, a prolonged video EEG recording to capture clinical or subclinical events may be helpful.    Tamar Alonso MD  Department of Neurology at [facility]  General Neurologist and Epileptologist   of Clinical Neurology, [facility].          [1]   Scheduled Medications   Medication Dose Frequency    famotidine  20 mg BID    enoxaparin (LOVENOX) injection  40 mg DAILY AT 1800    senna-docusate  2 Tablet Q EVENING

## 2025-06-17 NOTE — PROGRESS NOTES
Pt leaving AMA. MD notified and came bedside. Extensive education given on risk of leaving AMA. Note given to pt stating he was admitted to the hospital 6/15-6/17. PIV removed.

## 2025-06-17 NOTE — CARE PLAN
The patient is Watcher - Medium risk of patient condition declining or worsening    Shift Goals  Clinical Goals: CIWA, safety  Patient Goals: improve muscle spasm, pain control, sleep  Family Goals: avelina    Progress made toward(s) clinical / shift goals:    Problem: Pain - Standard  Goal: Alleviation of pain or a reduction in pain to the patient’s comfort goal  Description: Target End Date:  Prior to discharge or change in level of care    Document on Vitals flowsheet    1.  Document pain using the appropriate pain scale per order or unit policy  2.  Educate and implement non-pharmacologic comfort measures (i.e. relaxation, distraction, massage, cold/heat therapy, etc.)  3.  Pain management medications as ordered  4.  Reassess pain after pain med administration per policy  5.  If opiods administered assess patient's response to pain medication is appropriate per POSS sedation scale  6.  Follow pain management plan developed in collaboration with patient and interdisciplinary team (including palliative care or pain specialists if applicable)  Outcome: Progressing    Numerical pain scale used to assess pain throughout the shift. Medication provided PRN. Pharmacological and non-pharmacological pain measures used.       Problem: Optimal Care for Alcohol Withdrawal  Goal: Optimal Care for the alcohol withdrawal patient  Description: Target End Date:  1 to 3 days    1.  Alcohol history screening done on admission  2.  CIWA-AR score assessment (includes assessment of nausea/vomiting, tremor, sweats, anxiety, agitation, tactile, visual and auditory disturbances, headache and orientation/sensorium).  Document on CIWA flowsheet.  3.  Follow CIWA-AR score protocol  4.  Frequent reorientation  5.  Monitor for fluid and electrolyte imbalance.  6.  Assess for respiratory depression due to sedation (pulse ox)  7.  Consider thiamine, multivitamins, folic acid and magnesium sulfate per provider order  8.  Collaborate with Social  Workers/Case Management  9.  Collaborate with mental health  Outcome: Progressing    Patient monitored using CIWA scale, PRNs provided to manage symptoms, education provided regarding medications and disease process     Problem: Seizure Precautions  Goal: Implementation of seizure precautions  Description: Target End Date:  Prior to discharge or change in level of care    1.  Padded side rails up at all times  2.  Suction equipment and oxygen delivery system at bedside  3.  Continuous pulse oximeter in use  4.  Implement fall precautions, bed alarm on, bed in lowest position  5.  IV access (per order)  6.  Provide low stimulus environment, avoid exposure to triggers  7.  Instruct patient to use call light/seizure button if having warning signs of impending seizure  Outcome: Progressing    Seizure precautions in place (i.e. padded side rails, suction and supplemental oxygen available at bedside). PRNs available for breakthrough seizures.       Problem: Fall Risk  Goal: Patient will remain free from falls  Description: Target End Date:  Prior to discharge or change in level of care    Document interventions on the [facility] Fall Risk Assessment    1.  Assess for fall risk factors  2.  Implement fall precautions  Outcome: Progressing     Bed alarm in place. Pt calls for assistance and is provided appropriate assistive devices when needed. Treaded socks used when ambulating.         Patient is not progressing towards the following goals:

## 2025-06-18 ENCOUNTER — HOSPITAL ENCOUNTER (EMERGENCY)
Facility: MEDICAL CENTER | Age: 25
End: 2025-06-18
Attending: EMERGENCY MEDICINE

## 2025-06-18 VITALS
WEIGHT: 161 LBS | TEMPERATURE: 97.6 F | DIASTOLIC BLOOD PRESSURE: 66 MMHG | HEIGHT: 67 IN | OXYGEN SATURATION: 97 % | SYSTOLIC BLOOD PRESSURE: 128 MMHG | HEART RATE: 78 BPM | BODY MASS INDEX: 25.27 KG/M2 | RESPIRATION RATE: 18 BRPM

## 2025-06-18 DIAGNOSIS — F10.920 ACUTE ALCOHOLIC INTOXICATION WITHOUT COMPLICATION (HCC): Primary | ICD-10-CM

## 2025-06-18 DIAGNOSIS — R56.9 SEIZURE-LIKE ACTIVITY (HCC): ICD-10-CM

## 2025-06-18 DIAGNOSIS — F10.10 CHRONIC ALCOHOL ABUSE: ICD-10-CM

## 2025-06-18 LAB
ALBUMIN SERPL BCP-MCNC: 4.4 G/DL (ref 3.2–4.9)
ALBUMIN/GLOB SERPL: 1.5 G/DL
ALP SERPL-CCNC: 105 U/L (ref 30–99)
ALT SERPL-CCNC: 26 U/L (ref 2–50)
ANION GAP SERPL CALC-SCNC: 16 MMOL/L (ref 7–16)
AST SERPL-CCNC: 31 U/L (ref 12–45)
BASOPHILS # BLD AUTO: 0.5 % (ref 0–1.8)
BASOPHILS # BLD: 0.03 K/UL (ref 0–0.12)
BILIRUB SERPL-MCNC: 0.3 MG/DL (ref 0.1–1.5)
BUN SERPL-MCNC: 6 MG/DL (ref 8–22)
CALCIUM ALBUM COR SERPL-MCNC: 8.5 MG/DL (ref 8.5–10.5)
CALCIUM SERPL-MCNC: 8.8 MG/DL (ref 8.5–10.5)
CHLORIDE SERPL-SCNC: 105 MMOL/L (ref 96–112)
CO2 SERPL-SCNC: 20 MMOL/L (ref 20–33)
CREAT SERPL-MCNC: 0.96 MG/DL (ref 0.5–1.4)
EOSINOPHIL # BLD AUTO: 0.22 K/UL (ref 0–0.51)
EOSINOPHIL NFR BLD: 3.5 % (ref 0–6.9)
ERYTHROCYTE [DISTWIDTH] IN BLOOD BY AUTOMATED COUNT: 38.4 FL (ref 35.9–50)
ETHANOL BLD-MCNC: 205 MG/DL
GFR SERPLBLD CREATININE-BSD FMLA CKD-EPI: 112 ML/MIN/1.73 M 2
GLOBULIN SER CALC-MCNC: 2.9 G/DL (ref 1.9–3.5)
GLUCOSE SERPL-MCNC: 108 MG/DL (ref 65–99)
HCT VFR BLD AUTO: 44.7 % (ref 42–52)
HGB BLD-MCNC: 15.3 G/DL (ref 14–18)
IMM GRANULOCYTES # BLD AUTO: 0.01 K/UL (ref 0–0.11)
IMM GRANULOCYTES NFR BLD AUTO: 0.2 % (ref 0–0.9)
LYMPHOCYTES # BLD AUTO: 2.4 K/UL (ref 1–4.8)
LYMPHOCYTES NFR BLD: 38.2 % (ref 22–41)
MCH RBC QN AUTO: 28.4 PG (ref 27–33)
MCHC RBC AUTO-ENTMCNC: 34.2 G/DL (ref 32.3–36.5)
MCV RBC AUTO: 83.1 FL (ref 81.4–97.8)
MONOCYTES # BLD AUTO: 0.44 K/UL (ref 0–0.85)
MONOCYTES NFR BLD AUTO: 7 % (ref 0–13.4)
NEUTROPHILS # BLD AUTO: 3.19 K/UL (ref 1.82–7.42)
NEUTROPHILS NFR BLD: 50.6 % (ref 44–72)
NRBC # BLD AUTO: 0 K/UL
NRBC BLD-RTO: 0 /100 WBC (ref 0–0.2)
PLATELET # BLD AUTO: 332 K/UL (ref 164–446)
PMV BLD AUTO: 10 FL (ref 9–12.9)
POTASSIUM SERPL-SCNC: 3.7 MMOL/L (ref 3.6–5.5)
PROT SERPL-MCNC: 7.3 G/DL (ref 6–8.2)
RBC # BLD AUTO: 5.38 M/UL (ref 4.7–6.1)
SODIUM SERPL-SCNC: 141 MMOL/L (ref 135–145)
WBC # BLD AUTO: 6.3 K/UL (ref 4.8–10.8)

## 2025-06-18 PROCEDURE — 700111 HCHG RX REV CODE 636 W/ 250 OVERRIDE (IP): Performed by: EMERGENCY MEDICINE

## 2025-06-18 PROCEDURE — 82077 ASSAY SPEC XCP UR&BREATH IA: CPT

## 2025-06-18 PROCEDURE — 94760 N-INVAS EAR/PLS OXIMETRY 1: CPT

## 2025-06-18 PROCEDURE — 96366 THER/PROPH/DIAG IV INF ADDON: CPT

## 2025-06-18 PROCEDURE — 99285 EMERGENCY DEPT VISIT HI MDM: CPT

## 2025-06-18 PROCEDURE — 85025 COMPLETE CBC W/AUTO DIFF WBC: CPT

## 2025-06-18 PROCEDURE — 80053 COMPREHEN METABOLIC PANEL: CPT

## 2025-06-18 PROCEDURE — A9270 NON-COVERED ITEM OR SERVICE: HCPCS | Performed by: EMERGENCY MEDICINE

## 2025-06-18 PROCEDURE — 700105 HCHG RX REV CODE 258: Performed by: EMERGENCY MEDICINE

## 2025-06-18 PROCEDURE — 36415 COLL VENOUS BLD VENIPUNCTURE: CPT

## 2025-06-18 PROCEDURE — 700102 HCHG RX REV CODE 250 W/ 637 OVERRIDE(OP): Performed by: EMERGENCY MEDICINE

## 2025-06-18 PROCEDURE — 96365 THER/PROPH/DIAG IV INF INIT: CPT

## 2025-06-18 RX ORDER — LORAZEPAM 1 MG/1
1 TABLET ORAL ONCE
Status: COMPLETED | OUTPATIENT
Start: 2025-06-18 | End: 2025-06-18

## 2025-06-18 RX ADMIN — FOLIC ACID: 5 INJECTION, SOLUTION INTRAMUSCULAR; INTRAVENOUS; SUBCUTANEOUS at 01:08

## 2025-06-18 RX ADMIN — LORAZEPAM 1 MG: 1 TABLET ORAL at 01:31

## 2025-06-18 ASSESSMENT — LIFESTYLE VARIABLES
CONSUMPTION TOTAL: POSITIVE
VISUAL DISTURBANCES: NOT PRESENT
ANXIETY: MODERATELY ANXIOUS OR GUARDED, SO ANXIETY IS INFERRED
NAUSEA AND VOMITING: NO NAUSEA AND NO VOMITING
AGITATION: *
EVER FELT BAD OR GUILTY ABOUT YOUR DRINKING: NO
DOES PATIENT WANT TO STOP DRINKING: YES
AVERAGE NUMBER OF DAYS PER WEEK YOU HAVE A DRINK CONTAINING ALCOHOL: 7
TOTAL SCORE: 8
TOTAL SCORE: 2
DO YOU DRINK ALCOHOL: YES
HEADACHE, FULLNESS IN HEAD: NOT PRESENT
PAROXYSMAL SWEATS: NO SWEAT VISIBLE
TREMOR: *
ON A TYPICAL DAY WHEN YOU DRINK ALCOHOL HOW MANY DRINKS DO YOU HAVE: 15
HAVE YOU EVER FELT YOU SHOULD CUT DOWN ON YOUR DRINKING: YES
AUDITORY DISTURBANCES: NOT PRESENT
EVER HAD A DRINK FIRST THING IN THE MORNING TO STEADY YOUR NERVES TO GET RID OF A HANGOVER: YES
ORIENTATION AND CLOUDING OF SENSORIUM: ORIENTED AND CAN DO SERIAL ADDITIONS
DOES PATIENT WANT TO TALK TO SOMEONE ABOUT QUITTING: NO
HAVE PEOPLE ANNOYED YOU BY CRITICIZING YOUR DRINKING: NO
TOTAL SCORE: 2
HOW MANY TIMES IN THE PAST YEAR HAVE YOU HAD 5 OR MORE DRINKS IN A DAY: 365
TOTAL SCORE: 2

## 2025-06-18 ASSESSMENT — FIBROSIS 4 INDEX: FIB4 SCORE: 0.6

## 2025-06-18 NOTE — ED NOTES
"Pt pressed the staff assist button. Pt educated on purpose of staff assist button and reoriented to the call light provided. Pt is tearful and states that \"we are not doing anything for him\". Pt reassured and explained the care he has received thus far. ERP made aware.   "

## 2025-06-18 NOTE — ED PROVIDER NOTES
"ER Provider Note    Scribed for Dr. Gail Soto D.O. by Josue Kirby. 6/18/2025  12:24 AM    Primary Care Provider: Pcp Pt States None    CHIEF COMPLAINT  Chief Complaint   Patient presents with    Seizure     Patient BIBA by EMS from home after wanting to detox from alcohol. En route, patient had 2 seizures for EMS (approx 30 sec each). Upon arrival had another 30 second seizure-like episode.        EXTERNAL RECORDS REVIEWED  Inpatient Notes Patient was admitted to the hospital yesterday for seizure due to alcohol withdrawal.    HPI/ROS  LIMITATION TO HISTORY   Select: : None    OUTSIDE HISTORIAN(S):  EMS is present at bedside.    Praful Carmen is a 25 y.o. male who presents to the ED via EMS for evaluation of seizure-like activity onset tonight. Per EMS, patient had 2 seizure like episodes en route, and had another one at bedside. Endorses taking xanax and smoking marijuana since yesterday. Denies methamphetamine or fentanyl use. EMS reports that patient was seen here yesterday and left against medical advice.  Shortly after his \"seizure type activity\" patient was speaking and stating that he did drink alcohol today.    PAST MEDICAL HISTORY  Past Medical History[1]    SURGICAL HISTORY  Past Surgical History[2]    FAMILY HISTORY  No family history noted.    SOCIAL HISTORY   reports that he has been smoking cigarettes. He has been exposed to tobacco smoke. His smokeless tobacco use includes chew. He reports current alcohol use. He reports current drug use. Drug: Inhaled.    CURRENT MEDICATIONS  Previous Medications    ESCITALOPRAM (LEXAPRO) 20 MG TABLET    Take 20 mg by mouth every day.    FAMOTIDINE (PEPCID) 20 MG TAB    Take 20 mg by mouth every day.    FOLIC ACID (FOLVITE) 1 MG TAB    Take 1 Tablet by mouth every day for 90 days.    HYDROXYZINE HCL (ATARAX) 50 MG TAB    Take 50 mg by mouth 3 times a day as needed for Anxiety.    MULTIVITAMIN TAB    Take 1 Tablet by mouth every day for 90 days.    " "ONDANSETRON (ZOFRAN ODT) 4 MG TABLET DISPERSIBLE    Take 4 mg by mouth every 8 hours as needed for Nausea/Vomiting.    THIAMINE (THIAMINE) 100 MG TABLET    Take 1 Tablet by mouth every day for 90 days.       ALLERGIES  Patient has no known allergies.    PHYSICAL EXAM  /64   Pulse 84   Temp 36.4 °C (97.6 °F) (Temporal)   Resp 16   Ht 1.702 m (5' 7\")   Wt 73 kg (161 lb)   SpO2 97%   BMI 25.22 kg/m²   Constitutional: Patient is well developed, well nourished. Grossly intoxicated.  Mild distress.  HENT: Normocephalic, no signs of head or oral trauma.  Cardiovascular: Normal heart rate and Regular rhythm. No murmur,  Thorax & Lungs: Clear and equal breath sounds with good excursion. No respiratory distress, no rhonchi, wheezing.  Abdomen: Bowel sounds normal in all four quadrants. Soft, nontender, no rebound, guarding, palpable masses.   Skin: Warm, Dry    Extremities: Peripheral pulses 4/4 No edema, No tenderness, no signs of trauma.  Neurologic: Alert & oriented x 3, Normal motor function, Normal sensory function, Not postictal.  Psychiatric: Affect labile, judgment is impaired due to alcohol, mood is tearful.      DIAGNOSTIC STUDIES & PROCEDURES    Labs:   Results for orders placed or performed during the hospital encounter of 06/18/25   CBC WITH DIFFERENTIAL    Collection Time: 06/18/25 12:30 AM   Result Value Ref Range    WBC 6.3 4.8 - 10.8 K/uL    RBC 5.38 4.70 - 6.10 M/uL    Hemoglobin 15.3 14.0 - 18.0 g/dL    Hematocrit 44.7 42.0 - 52.0 %    MCV 83.1 81.4 - 97.8 fL    MCH 28.4 27.0 - 33.0 pg    MCHC 34.2 32.3 - 36.5 g/dL    RDW 38.4 35.9 - 50.0 fL    Platelet Count 332 164 - 446 K/uL    MPV 10.0 9.0 - 12.9 fL    Neutrophils-Polys 50.60 44.00 - 72.00 %    Lymphocytes 38.20 22.00 - 41.00 %    Monocytes 7.00 0.00 - 13.40 %    Eosinophils 3.50 0.00 - 6.90 %    Basophils 0.50 0.00 - 1.80 %    Immature Granulocytes 0.20 0.00 - 0.90 %    Nucleated RBC 0.00 0.00 - 0.20 /100 WBC    Neutrophils (Absolute) 3.19 " 1.82 - 7.42 K/uL    Lymphs (Absolute) 2.40 1.00 - 4.80 K/uL    Monos (Absolute) 0.44 0.00 - 0.85 K/uL    Eos (Absolute) 0.22 0.00 - 0.51 K/uL    Baso (Absolute) 0.03 0.00 - 0.12 K/uL    Immature Granulocytes (abs) 0.01 0.00 - 0.11 K/uL    NRBC (Absolute) 0.00 K/uL   COMP METABOLIC PANEL    Collection Time: 06/18/25 12:30 AM   Result Value Ref Range    Sodium 141 135 - 145 mmol/L    Potassium 3.7 3.6 - 5.5 mmol/L    Chloride 105 96 - 112 mmol/L    Co2 20 20 - 33 mmol/L    Anion Gap 16.0 7.0 - 16.0    Glucose 108 (H) 65 - 99 mg/dL    Bun 6 (L) 8 - 22 mg/dL    Creatinine 0.96 0.50 - 1.40 mg/dL    Calcium 8.8 8.5 - 10.5 mg/dL    Correct Calcium 8.5 8.5 - 10.5 mg/dL    AST(SGOT) 31 12 - 45 U/L    ALT(SGPT) 26 2 - 50 U/L    Alkaline Phosphatase 105 (H) 30 - 99 U/L    Total Bilirubin 0.3 0.1 - 1.5 mg/dL    Albumin 4.4 3.2 - 4.9 g/dL    Total Protein 7.3 6.0 - 8.2 g/dL    Globulin 2.9 1.9 - 3.5 g/dL    A-G Ratio 1.5 g/dL   DIAGNOSTIC ALCOHOL    Collection Time: 06/18/25 12:30 AM   Result Value Ref Range    Diagnostic Alcohol 205.0 (H) <10.1 mg/dL   ESTIMATED GFR    Collection Time: 06/18/25 12:30 AM   Result Value Ref Range    GFR (CKD-EPI) 112 >60 mL/min/1.73 m 2      All labs reviewed by me.         COURSE & MEDICAL DECISION MAKING     INITIAL ASSESSMENT AND PLAN  Care Narrative:         12:24 AM - Patient seen and evaluated at bedside. Discussed plan of care, including labs. Patient agrees to plan of care. Differential diagnoses include but are not limited to: Alcohol abuse, polysubstance abuse.    12:41 AM - Per nurse, the patient was caught trying to suck blood out of his IV insertion site.    1:19 AM - Per nurse, patient is becoming agitated. Will order Ativan.  Patient was medicated and then sent over to the green pod awaiting clinical improvement.  He then began complaining of pain and wanted narcotic pain medication of which she was denied.  4:00am patient insisting on more medication to treat him for detox even  though his vital signs are completely normal and he does not experience any tremors.  He has not exhibited any other seizure type activity since he has been in the department.  He was given IV fluids for dehydration.    Hydration: Based on the patient's presentation of Dehydration the patient was given IV fluids. IV Hydration was used because oral hydration was not adequate alone. Upon recheck following hydration, the patient was improved.   5:50 am-patient demanding hospital admission for detox.  I explained to him that we do not do alcohol detox at this hospital unless he is going through withdrawals.  Patient has normal vital signs at this time and I do not feel requires hospital admission for what appears to be fake seizure type activity and using our department as a revolving door for treatment.  I have explained to him that I would give him prescriptions for outpatient treatment as long as his aunt would administer the medications to him and he states that he will ask her to come down to the department.  This conversation with him went around and around for a period of time.  I did have behavioral health evaluate the patient and at this point because he has no insurance despite him telling me that he did he does not meet criteria for inpatient treatment at [facility].  He was given outpatient referrals for detox and rehab and will be sent home in stable and improved condition.                 DISPOSITION AND DISCUSSIONS  I have discussed management of the patient with the following physicians and RADHA's: None    Discussion of management with other QHP or appropriate source(s): None     Escalation of care considered, and ultimately not performed: acute inpatient care management, however at this time, the patient is most appropriate for outpatient management.    Barriers to care at this time, including but not limited to: Patient does not have established PCP.     Decision tools and prescription drugs  considered including, but not limited to: None.        FINAL IMPRESSION   1. Acute alcoholic intoxication without complication (HCC)    2. Seizure-like activity (HCC)    3. Chronic alcohol abuse         Josue DE LEÓN), am scribing for, and in the presence of, Gail Soto D.O..    Electronically signed by: Josue Alanis), 6/18/2025    IGail D.O. personally performed the services described in this documentation, as scribed by Josue Kirby in my presence, and it is both accurate and complete.    The note accurately reflects work and decisions made by me.  Gail Soto D.O.  6/18/2025  6:56 AM          [1]   Past Medical History:  Diagnosis Date    Asthma     ETOH abuse    [2]   Past Surgical History:  Procedure Laterality Date    SUBMANDIBLE ABSCESS INCISION AND DRAINAGE Left 6/7/2017    Procedure: SUBMANDIBLE ABSCESS INCISION AND DRAINAGE;  Surgeon: David Gilbert D.D.S.;  Location: [facility];  Service:

## 2025-06-18 NOTE — ED NOTES
This RN caught patient sucking on his PIV. Patient had taken the hub of the pig tail off in attempt to suck out his blood. Pigtail was clamped so this was unsuccessful. Patient educated on behavior expectations in the ED. ERP notified. Patient to be placed in soft restraints if needed.

## 2025-06-18 NOTE — ED TRIAGE NOTES
Chief Complaint   Patient presents with    Seizure     Patient BIBA by EMS from home after wanting to detox from alcohol. En route, patient had 2 seizures for EMS (approx 30 sec each). Upon arrival had another 30 second seizure-like episode.         Patient to blue 19 by EMS iris for above complaint. ERP paged to bedside. Patient AAxO 4 after witnessed episode. [facility] EMS: 114. VSS on RA.      Given: 2 mg versed and 4 zofran en route.

## 2025-06-18 NOTE — DISCHARGE SUMMARY
AMA Discharge Summary    CHIEF COMPLAINT ON ADMISSION  Chief Complaint   Patient presents with    Seizure     Neighbor called for pt, pt had a seizure, stated that neighbor noticed pt was shaking really bad while sitting down Per EMS pt found on ground of parking lot. Pt is detoxing from alcohol, last drink yesterday - would drink 10 shots/day for 3 years. Last detox 1 month ago, states he had seizures. GCS 15.     Abdominal Pain     Pt c/o mid abd pain, 10/10 stabbing pain. Started after seizure today. +n/v        Reason for Admission  ems     Admission Date  6/15/2025    CODE STATUS  Prior    HPI & HOSPITAL COURSE  This is a 25-year-old male with history of alcohol abuse, who was brought in here with tonic-clonic seizure as witnessed by neighbor 6/15. Patient has a history of alcohol use, drinks about 10 shots a day. He had been complaining of epigastric pain, nausea, vomiting.      In ER, patient blood alcohol noted to be 260s, CT  head negative, lipase 26.  He had another seizure episode in the ER. Patient admitted for alcohol withdrawal seizures. Patient was started on ciwa protocol and IVF. Given patient had tonic-clonic seizure while his blood diagnostic alcohol level of 260, neurology was reached out and recommended EEG. EEG was done without evidence of epileptiform discharges.     On 6/17, patient is still very anxious. CIWA score 17. Patient wants to leave the hospital AGAINST MEDICAL ADVICE despite he is still anxious and high CIWA score. I examined and discussed extensively with patient at bedside. I discussed with him about alcohol cessation. He is agreeable and said he will not drink anymore and he has plan to go to alcohol detox rehab. Prolonged time spent with patient discussing the risk of leaving the hospital AGAINST MEDICAL ADVICE.  At this moment patient is alert oriented x4 and from my best clinical knowledge and judgment patient does have decision-making capacity. Patient is not a risk to  himself or others. I explained to the patient the risk including worsening of medical condition, not able to have enough medical care, worse case scenario can be even death.  Patient understood and still wants to leave the hospital AGAINST MEDICAL ADVICE. Patient currently does not have risk of imminent death. Patient left the hospital AGAINST MEDICAL ADVICE.    I have advised patient to come back to ER with worsening symptoms. He voiced understanding.     Therefore, he is discharged in guarded and stable condition against medcial advice.    The patient met 2-midnight criteria for an inpatient stay at the time of discharge.    Discharge Date  6/17/2025    FOLLOW UP ITEMS POST DISCHARGE  PCP  ER with worsening symptoms     DISCHARGE DIAGNOSES  Principal Problem:    Seizure due to alcohol withdrawal, uncomplicated (HCC) (POA: Yes)  Active Problems:    Drug abuse (HCC) (POA: Yes)    Leukocytosis (POA: Unknown)    GERD (gastroesophageal reflux disease) (POA: Unknown)  Resolved Problems:    * No resolved hospital problems. *      FOLLOW UP  No future appointments.  No follow-up provider specified.    MEDICATIONS ON DISCHARGE     Medication List        ASK your doctor about these medications        Instructions   escitalopram 20 MG tablet  Commonly known as: Lexapro   Take 20 mg by mouth every day.  Dose: 20 mg     famotidine 20 MG Tabs  Commonly known as: Pepcid   Take 20 mg by mouth every day.  Dose: 20 mg     folic acid 1 MG Tabs  Commonly known as: Folvite   Take 1 Tablet by mouth every day for 90 days.  Dose: 1 mg     hydrOXYzine HCl 50 MG Tabs  Commonly known as: Atarax   Take 50 mg by mouth 3 times a day as needed for Anxiety.  Dose: 50 mg     multivitamin Tabs   Take 1 Tablet by mouth every day for 90 days.  Dose: 1 Tablet     ondansetron 4 MG Tbdp  Commonly known as: Zofran ODT   Take 4 mg by mouth every 8 hours as needed for Nausea/Vomiting.  Dose: 4 mg     thiamine 100 MG tablet  Commonly known as: Thiamine    Take 1 Tablet by mouth every day for 90 days.  Dose: 100 mg              Allergies  Allergies[1]    DIET  No orders of the defined types were placed in this encounter.      ACTIVITY  As tolerated.  Weight bearing as tolerated    CONSULTATIONS  na    PROCEDURES  na    LABORATORY  Lab Results   Component Value Date    SODIUM 137 06/17/2025    POTASSIUM 4.0 06/17/2025    CHLORIDE 107 06/17/2025    CO2 21 06/17/2025    GLUCOSE 78 06/17/2025    BUN 6 (L) 06/17/2025    CREATININE 0.78 06/17/2025        Lab Results   Component Value Date    WBC 4.9 06/17/2025    HEMOGLOBIN 14.3 06/17/2025    HEMATOCRIT 42.0 06/17/2025    PLATELETCT 250 06/17/2025      CT-HEAD W/O   Final Result      No acute intracranial findings.                     Total time of the discharge process 33 minutes.       [1] No Known Allergies

## 2025-06-18 NOTE — ED NOTES
Pt asking to speak with the ERP. ERP made aware. Pt reports no needs at this time. Call light is within reach.

## 2025-06-18 NOTE — ED NOTES
Patient taken to [place] [id number] in [facility] by Ed tech with all personal belongings.

## 2025-06-18 NOTE — ED NOTES
Written and verbal instructions provided to pt. Pt instructed [facility] with [facility]. Resources provided to pt by ALERT team. Pt verbalized understanding of discharge instructions. Pt ambulatory upon discharge with all belongings.

## 2025-06-18 NOTE — ED NOTES
Pt pressed the staff assist button. Pt sitting up in [facility], pt tearful. Pt reassured. Pt educated on the use of the provided call light. Pt reports no other needs at this time, call light is within reach.

## 2025-06-18 NOTE — DISCHARGE INSTRUCTIONS
Follow-up outpatient for treatment for your alcohol abuse.  Take a multivitamin and B complex daily
